# Patient Record
Sex: FEMALE | Race: WHITE | Employment: FULL TIME | URBAN - METROPOLITAN AREA
[De-identification: names, ages, dates, MRNs, and addresses within clinical notes are randomized per-mention and may not be internally consistent; named-entity substitution may affect disease eponyms.]

---

## 2017-04-03 ENCOUNTER — HOSPITAL ENCOUNTER (EMERGENCY)
Age: 52
Discharge: HOME | End: 2017-04-03
Attending: EMERGENCY MEDICINE
Payer: COMMERCIAL

## 2017-04-03 ENCOUNTER — APPOINTMENT (OUTPATIENT)
Dept: GENERAL RADIOLOGY | Age: 52
End: 2017-04-03
Attending: EMERGENCY MEDICINE
Payer: COMMERCIAL

## 2017-04-03 VITALS
TEMPERATURE: 98.4 F | HEART RATE: 79 BPM | RESPIRATION RATE: 17 BRPM | WEIGHT: 139 LBS | OXYGEN SATURATION: 98 % | SYSTOLIC BLOOD PRESSURE: 145 MMHG | BODY MASS INDEX: 25.58 KG/M2 | DIASTOLIC BLOOD PRESSURE: 95 MMHG | HEIGHT: 62 IN

## 2017-04-03 DIAGNOSIS — S52.552A OTHER CLOSED EXTRA-ARTICULAR FRACTURE OF DISTAL END OF LEFT RADIUS, INITIAL ENCOUNTER: Primary | ICD-10-CM

## 2017-04-03 DIAGNOSIS — T74.91XA DOMESTIC VIOLENCE OF ADULT, INITIAL ENCOUNTER: ICD-10-CM
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
## 2017-04-03 LAB
ANION GAP BLD CALC-SCNC: 8 MMOL/L (ref 7–16)
BASOPHILS # BLD AUTO: 0.1 K/UL (ref 0–0.2)
BASOPHILS # BLD: 1 % (ref 0–2)
BUN SERPL-MCNC: 17 MG/DL (ref 6–23)
CALCIUM SERPL-MCNC: 8.6 MG/DL (ref 8.3–10.4)
CHLORIDE SERPL-SCNC: 108 MMOL/L (ref 98–107)
CO2 SERPL-SCNC: 25 MMOL/L (ref 21–32)
CREAT SERPL-MCNC: 0.94 MG/DL (ref 0.6–1)
CRP SERPL-MCNC: 1 MG/DL (ref 0–0.9)
DIFFERENTIAL METHOD BLD: ABNORMAL
EOSINOPHIL # BLD: 0.2 K/UL (ref 0–0.8)
EOSINOPHIL NFR BLD: 3 % (ref 0.5–7.8)
ERYTHROCYTE [DISTWIDTH] IN BLOOD BY AUTOMATED COUNT: 14.8 % (ref 11.9–14.6)
ERYTHROCYTE [SEDIMENTATION RATE] IN BLOOD: 32 MM/HR (ref 0–30)
GLUCOSE SERPL-MCNC: 113 MG/DL (ref 65–100)
HCT VFR BLD AUTO: 36.3 % (ref 35.8–46.3)
HGB BLD-MCNC: 11.6 G/DL (ref 11.7–15.4)
IMM GRANULOCYTES # BLD: 0 K/UL (ref 0–0.5)
IMM GRANULOCYTES NFR BLD AUTO: 0.2 % (ref 0–5)
LYMPHOCYTES # BLD AUTO: 40 % (ref 13–44)
LYMPHOCYTES # BLD: 2.4 K/UL (ref 0.5–4.6)
MCH RBC QN AUTO: 27.6 PG (ref 26.1–32.9)
MCHC RBC AUTO-ENTMCNC: 32 G/DL (ref 31.4–35)
MCV RBC AUTO: 86.2 FL (ref 79.6–97.8)
MONOCYTES # BLD: 0.3 K/UL (ref 0.1–1.3)
MONOCYTES NFR BLD AUTO: 5 % (ref 4–12)
NEUTS SEG # BLD: 3 K/UL (ref 1.7–8.2)
NEUTS SEG NFR BLD AUTO: 51 % (ref 43–78)
PLATELET # BLD AUTO: 275 K/UL (ref 150–450)
PMV BLD AUTO: 9.6 FL (ref 10.8–14.1)
POTASSIUM SERPL-SCNC: 3.9 MMOL/L (ref 3.5–5.1)
RBC # BLD AUTO: 4.21 M/UL (ref 4.05–5.25)
SODIUM SERPL-SCNC: 141 MMOL/L (ref 136–145)
WBC # BLD AUTO: 5.9 K/UL (ref 4.3–11.1)

## 2017-04-03 PROCEDURE — 96365 THER/PROPH/DIAG IV INF INIT: CPT | Performed by: EMERGENCY MEDICINE

## 2017-04-03 PROCEDURE — 86140 C-REACTIVE PROTEIN: CPT | Performed by: EMERGENCY MEDICINE

## 2017-04-03 PROCEDURE — L3908 WHO COCK-UP NONMOLDE PRE OTS: HCPCS

## 2017-04-03 PROCEDURE — 99284 EMERGENCY DEPT VISIT MOD MDM: CPT | Performed by: EMERGENCY MEDICINE

## 2017-04-03 PROCEDURE — 85652 RBC SED RATE AUTOMATED: CPT | Performed by: EMERGENCY MEDICINE

## 2017-04-03 PROCEDURE — 96366 THER/PROPH/DIAG IV INF ADDON: CPT | Performed by: EMERGENCY MEDICINE

## 2017-04-03 PROCEDURE — 74011250636 HC RX REV CODE- 250/636: Performed by: EMERGENCY MEDICINE

## 2017-04-03 PROCEDURE — 80048 BASIC METABOLIC PNL TOTAL CA: CPT | Performed by: EMERGENCY MEDICINE

## 2017-04-03 PROCEDURE — 73140 X-RAY EXAM OF FINGER(S): CPT

## 2017-04-03 PROCEDURE — 85025 COMPLETE CBC W/AUTO DIFF WBC: CPT | Performed by: EMERGENCY MEDICINE

## 2017-04-03 PROCEDURE — 75810000053 HC SPLINT APPLICATION: Performed by: EMERGENCY MEDICINE

## 2017-04-03 PROCEDURE — 74011250637 HC RX REV CODE- 250/637: Performed by: EMERGENCY MEDICINE

## 2017-04-03 PROCEDURE — 73110 X-RAY EXAM OF WRIST: CPT

## 2017-04-03 RX ORDER — HYDROCODONE BITARTRATE AND ACETAMINOPHEN 5; 325 MG/1; MG/1
1-2 TABLET ORAL
Qty: 12 TAB | Refills: 0 | Status: SHIPPED | OUTPATIENT
Start: 2017-04-03 | End: 2017-04-03

## 2017-04-03 RX ORDER — IBUPROFEN 800 MG/1
800 TABLET ORAL
Qty: 20 TAB | Refills: 0 | Status: SHIPPED | OUTPATIENT
Start: 2017-04-03 | End: 2017-04-10

## 2017-04-03 RX ORDER — AMOXICILLIN AND CLAVULANATE POTASSIUM 875; 125 MG/1; MG/1
1 TABLET, FILM COATED ORAL 2 TIMES DAILY
Qty: 14 TAB | Refills: 0 | Status: SHIPPED | OUTPATIENT
Start: 2017-04-03 | End: 2017-04-03

## 2017-04-03 RX ORDER — HYDROCODONE BITARTRATE AND ACETAMINOPHEN 5; 325 MG/1; MG/1
1-2 TABLET ORAL
Qty: 30 TAB | Refills: 0 | Status: SHIPPED | OUTPATIENT
Start: 2017-04-03 | End: 2017-04-30

## 2017-04-03 RX ORDER — AMOXICILLIN AND CLAVULANATE POTASSIUM 875; 125 MG/1; MG/1
1 TABLET, FILM COATED ORAL 2 TIMES DAILY
Qty: 28 TAB | Refills: 0 | Status: SHIPPED | OUTPATIENT
Start: 2017-04-03 | End: 2017-04-17

## 2017-04-03 RX ORDER — IBUPROFEN 800 MG/1
800 TABLET ORAL
Qty: 20 TAB | Refills: 0 | Status: SHIPPED | OUTPATIENT
Start: 2017-04-03 | End: 2017-04-03

## 2017-04-03 RX ORDER — AMOXICILLIN AND CLAVULANATE POTASSIUM 875; 125 MG/1; MG/1
1 TABLET, FILM COATED ORAL
Status: COMPLETED | OUTPATIENT
Start: 2017-04-03 | End: 2017-04-03

## 2017-04-03 RX ADMIN — VANCOMYCIN HYDROCHLORIDE 1000 MG: 1 INJECTION, POWDER, LYOPHILIZED, FOR SOLUTION INTRAVENOUS at 08:54

## 2017-04-03 RX ADMIN — AMOXICILLIN AND CLAVULANATE POTASSIUM 1 TABLET: 875; 125 TABLET, FILM COATED ORAL at 07:54
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
## 2017-04-03 NOTE — LETTER
3777 [facility] EMERGENCY DEPT One [address] [address] 
[phone] Work/School Note Date: 4/3/2017 To Whom It May concern: 
 
Nona Warner was seen and treated today in the emergency room by the following provider(s): 
Attending Provider: Nhung Alvarado MD.   
 
Nona Warner may return to work on 4/05/17, please excuse Monday and/or Tuesday as needed.  
 
Sincerely, 
 
 
 
 
Nhung Alvarado MD

## 2017-04-03 NOTE — DISCHARGE INSTRUCTIONS
Neopsporin, or triple antibiotic, or bacitracin, and bandages to cover wounds. Rest, ice, elevate arm, splint. Follow up with dr Kimberlee Johnston: Care Instructions  Your Care Instructions  After an animal bite, the biggest concern is infection. The chance of infection depends on the type of animal that bit you, where on your body you were bitten, and your general health. Many animal bites are not closed with stitches, because this can increase the chance of infection. Your bite may take as little as 7 days or as long as several months to heal, depending on how bad it is. Taking good care of your wound at home will help it heal and reduce your chance of infection. The doctor has checked you carefully, but problems can develop later. If you notice any problems or new symptoms, get medical treatment right away. Follow-up care is a key part of your treatment and safety. Be sure to make and go to all appointments, and call your doctor if you are having problems. It's also a good idea to know your test results and keep a list of the medicines you take. How can you care for yourself at home? · If your doctor told you how to care for your wound, follow your doctor's instructions. If you did not get instructions, follow this general advice:  ¨ After 24 to 48 hours, gently wash the wound with clean water 2 times a day. Do not scrub or soak the wound. Don't use hydrogen peroxide or alcohol, which can slow healing. ¨ You may cover the wound with a thin layer of petroleum jelly, such as Vaseline, and a nonstick bandage. ¨ Apply more petroleum jelly and replace the bandage as needed. · After you shower, gently dry the wound with a clean towel. · If your doctor has closed the wound, cover the bandage with a plastic bag before you take a shower. · A small amount of skin redness and swelling around the wound edges and the stitches or staples is normal. Your wound may itch or feel irritated.  Do not scratch or rub the wound. · Ask your doctor if you can take an over-the-counter pain medicine, such as acetaminophen (Tylenol), ibuprofen (Advil, Motrin), or naproxen (Aleve). Read and follow all instructions on the label. · Do not take two or more pain medicines at the same time unless the doctor told you to. Many pain medicines have acetaminophen, which is Tylenol. Too much acetaminophen (Tylenol) can be harmful. · If your bite puts you at risk for rabies, you will get a series of shots over the next few weeks to prevent rabies. Your doctor will tell you when to get the shots. It is very important that you get the full cycle of shots. Follow your doctor's instructions exactly. · You may need a tetanus shot if you have not received one in the last 5 years. · If your doctor prescribed antibiotics, take them as directed. Do not stop taking them just because you feel better. You need to take the full course of antibiotics. When should you call for help? Call your doctor now or seek immediate medical care if:  · The skin near the bite turns cold or pale or it changes color. · You lose feeling in the area near the bite, or it feels numb or tingly. · You have trouble moving a limb near the bite. · You have symptoms of infection, such as:  ¨ Increased pain, swelling, warmth, or redness near the wound. ¨ Red streaks leading from the wound. ¨ Pus draining from the wound. ¨ A fever. · Blood soaks through the bandage. Oozing small amounts of blood is normal.  · Your pain is getting worse. Watch closely for changes in your health, and be sure to contact your doctor if you are not getting better as expected. Where can you learn more? Go to http://fany-coreen.info/. Enter K719 in the search box to learn more about \"Animal Bites: Care Instructions. \"  Current as of: May 27, 2016  Content Version: 11.2  © 1392-5689 [employer].  Care instructions adapted under license by [employer] (which disclaims liability or warranty for this information). If you have questions about a medical condition or this instruction, always ask your healthcare professional. [address] any warranty or liability for your use of this information.
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
## 2017-04-03 NOTE — LETTER
3517 [facility] EMERGENCY DEPT One [address] [address] [phone] Work/School Note Date: 4/3/2017 To Whom It May concern: 
 
Bernardino Thibodeaux was seen and treated today in the emergency room by the following provider(s): 
Attending Provider: Yvrose Matute MD.   
 
Bernardino Thibodeaux may return to work on 04/06/2017.  
 
Sincerely, 
 
 
 
 
Mary Ellen Fitzpatrick RN
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
## 2017-04-30 ENCOUNTER — HOSPITAL ENCOUNTER (EMERGENCY)
Age: 52
Discharge: HOME | End: 2017-04-30
Attending: EMERGENCY MEDICINE
Payer: COMMERCIAL

## 2017-04-30 ENCOUNTER — APPOINTMENT (OUTPATIENT)
Dept: GENERAL RADIOLOGY | Age: 52
End: 2017-04-30
Attending: NURSE PRACTITIONER
Payer: COMMERCIAL

## 2017-04-30 VITALS
HEART RATE: 84 BPM | TEMPERATURE: 98.1 F | BODY MASS INDEX: 25.58 KG/M2 | DIASTOLIC BLOOD PRESSURE: 74 MMHG | RESPIRATION RATE: 18 BRPM | HEIGHT: 62 IN | SYSTOLIC BLOOD PRESSURE: 136 MMHG | WEIGHT: 139 LBS | OXYGEN SATURATION: 100 %
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
## 2017-04-30 DIAGNOSIS — S62.609B FINGER FRACTURE, RIGHT, OPEN, INITIAL ENCOUNTER: ICD-10-CM

## 2017-04-30 PROCEDURE — 73090 X-RAY EXAM OF FOREARM: CPT

## 2017-04-30 PROCEDURE — 99284 EMERGENCY DEPT VISIT MOD MDM: CPT | Performed by: NURSE PRACTITIONER

## 2017-04-30 PROCEDURE — 96375 TX/PRO/DX INJ NEW DRUG ADDON: CPT | Performed by: NURSE PRACTITIONER

## 2017-04-30 PROCEDURE — 96365 THER/PROPH/DIAG IV INF INIT: CPT | Performed by: NURSE PRACTITIONER

## 2017-04-30 PROCEDURE — 96366 THER/PROPH/DIAG IV INF ADDON: CPT | Performed by: NURSE PRACTITIONER

## 2017-04-30 PROCEDURE — 74011250636 HC RX REV CODE- 250/636: Performed by: NURSE PRACTITIONER

## 2017-04-30 PROCEDURE — 74011250637 HC RX REV CODE- 250/637: Performed by: NURSE PRACTITIONER

## 2017-04-30 PROCEDURE — 73130 X-RAY EXAM OF HAND: CPT

## 2017-04-30 PROCEDURE — 77030018836 HC SOL IRR NACL ICUM -A

## 2017-04-30 RX ORDER — HYDROMORPHONE HYDROCHLORIDE 1 MG/ML
0.5 INJECTION, SOLUTION INTRAMUSCULAR; INTRAVENOUS; SUBCUTANEOUS
Status: COMPLETED | OUTPATIENT
Start: 2017-04-30 | End: 2017-04-30

## 2017-04-30 RX ORDER — ONDANSETRON 8 MG/1
8 TABLET, ORALLY DISINTEGRATING ORAL
Qty: 10 TAB | Refills: 0 | Status: SHIPPED | OUTPATIENT
Start: 2017-04-30 | End: 2017-07-10

## 2017-04-30 RX ORDER — AMOXICILLIN AND CLAVULANATE POTASSIUM 875; 125 MG/1; MG/1
1 TABLET, FILM COATED ORAL 2 TIMES DAILY
Qty: 14 TAB | Refills: 0 | Status: SHIPPED | OUTPATIENT
Start: 2017-04-30 | End: 2017-05-07

## 2017-04-30 RX ORDER — SODIUM CHLORIDE 9 MG/ML
1000 INJECTION, SOLUTION INTRAVENOUS CONTINUOUS
Status: DISCONTINUED | OUTPATIENT
Start: 2017-04-30 | End: 2017-04-30 | Stop reason: HOSPADM

## 2017-04-30 RX ORDER — HYDROCODONE BITARTRATE AND ACETAMINOPHEN 5; 325 MG/1; MG/1
1 TABLET ORAL ONCE
Status: COMPLETED | OUTPATIENT
Start: 2017-04-30 | End: 2017-04-30

## 2017-04-30 RX ORDER — HYDROCODONE BITARTRATE AND ACETAMINOPHEN 5; 325 MG/1; MG/1
1 TABLET ORAL ONCE
Qty: 20 TAB | Refills: 0 | Status: SHIPPED | OUTPATIENT
Start: 2017-04-30 | End: 2017-04-30

## 2017-04-30 RX ORDER — ONDANSETRON 2 MG/ML
4 INJECTION INTRAMUSCULAR; INTRAVENOUS
Status: COMPLETED | OUTPATIENT
Start: 2017-04-30 | End: 2017-04-30

## 2017-04-30 RX ADMIN — SODIUM CHLORIDE 1000 ML/HR: 900 INJECTION, SOLUTION INTRAVENOUS at 16:01

## 2017-04-30 RX ADMIN — ONDANSETRON 4 MG: 2 INJECTION INTRAMUSCULAR; INTRAVENOUS at 17:03

## 2017-04-30 RX ADMIN — HYDROMORPHONE HYDROCHLORIDE 0.5 MG: 1 INJECTION, SOLUTION INTRAMUSCULAR; INTRAVENOUS; SUBCUTANEOUS at 17:03

## 2017-04-30 RX ADMIN — HYDROCODONE BITARTRATE AND ACETAMINOPHEN 1 TABLET: 5; 325 TABLET ORAL at 14:52

## 2017-04-30 RX ADMIN — VANCOMYCIN HYDROCHLORIDE 1000 MG: 1 INJECTION, POWDER, LYOPHILIZED, FOR SOLUTION INTRAVENOUS at 16:01

## 2017-04-30 NOTE — Clinical Note
Home with family   Elevate limbs to prevent further edema. Keep wounds clean and dry. Follow with Dr Erenest Canavan tomorrow. Pain meds as needed.   Work note until cleared by Dr Erenest Canavan
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
## 2017-04-30 NOTE — DISCHARGE INSTRUCTIONS
Animal Bites: Care Instructions  Your Care Instructions  After an animal bite, the biggest concern is infection. The chance of infection depends on the type of animal that bit you, where on your body you were bitten, and your general health. Many animal bites are not closed with stitches, because this can increase the chance of infection. Your bite may take as little as 7 days or as long as several months to heal, depending on how bad it is. Taking good care of your wound at home will help it heal and reduce your chance of infection. The doctor has checked you carefully, but problems can develop later. If you notice any problems or new symptoms, get medical treatment right away. Follow-up care is a key part of your treatment and safety. Be sure to make and go to all appointments, and call your doctor if you are having problems. It's also a good idea to know your test results and keep a list of the medicines you take. How can you care for yourself at home? · If your doctor told you how to care for your wound, follow your doctor's instructions. If you did not get instructions, follow this general advice:  ¨ After 24 to 48 hours, gently wash the wound with clean water 2 times a day. Do not scrub or soak the wound. Don't use hydrogen peroxide or alcohol, which can slow healing. ¨ You may cover the wound with a thin layer of petroleum jelly, such as Vaseline, and a nonstick bandage. ¨ Apply more petroleum jelly and replace the bandage as needed. · After you shower, gently dry the wound with a clean towel. · If your doctor has closed the wound, cover the bandage with a plastic bag before you take a shower. · A small amount of skin redness and swelling around the wound edges and the stitches or staples is normal. Your wound may itch or feel irritated. Do not scratch or rub the wound.   · Ask your doctor if you can take an over-the-counter pain medicine, such as acetaminophen (Tylenol), ibuprofen (Advil, Motrin), or naproxen (Aleve). Read and follow all instructions on the label. · Do not take two or more pain medicines at the same time unless the doctor told you to. Many pain medicines have acetaminophen, which is Tylenol. Too much acetaminophen (Tylenol) can be harmful. · If your bite puts you at risk for rabies, you will get a series of shots over the next few weeks to prevent rabies. Your doctor will tell you when to get the shots. It is very important that you get the full cycle of shots. Follow your doctor's instructions exactly. · You may need a tetanus shot if you have not received one in the last 5 years. · If your doctor prescribed antibiotics, take them as directed. Do not stop taking them just because you feel better. You need to take the full course of antibiotics. When should you call for help? Call your doctor now or seek immediate medical care if:  · The skin near the bite turns cold or pale or it changes color. · You lose feeling in the area near the bite, or it feels numb or tingly. · You have trouble moving a limb near the bite. · You have symptoms of infection, such as:  ¨ Increased pain, swelling, warmth, or redness near the wound. ¨ Red streaks leading from the wound. ¨ Pus draining from the wound. ¨ A fever. · Blood soaks through the bandage. Oozing small amounts of blood is normal.  · Your pain is getting worse. Watch closely for changes in your health, and be sure to contact your doctor if you are not getting better as expected. Where can you learn more? Go to http://fany-coreen.info/. Enter H022 in the search box to learn more about \"Animal Bites: Care Instructions. \"  Current as of: May 27, 2016  Content Version: 11.2  © 9583-5914 [employer]. Care instructions adapted under license by [employer] (which disclaims liability or warranty for this information).  If you have questions about a medical condition or this instruction, always ask your healthcare professional. [place] [address] any warranty or liability for your use of this information. Finger Fracture: Care Instructions  Your Care Instructions    Breaks in the bones of the finger usually heal well in about 3 to 4 weeks. The pain and swelling from a broken finger can last for weeks. But it should steadily improve, starting a few days after you break it. It is very important that you wear and take care of the cast or splint exactly as your doctor tells you to so that your finger heals properly and does not end up crooked. Wearing a splint may interfere with your normal activities. Ask for help with daily tasks if you need it. You heal best when you take good care of yourself. Eat a variety of healthy foods, and don't smoke. Follow-up care is a key part of your treatment and safety. Be sure to make and go to all appointments, and call your doctor if you are having problems. It's also a good idea to know your test results and keep a list of the medicines you take. How can you care for yourself at home? · If your doctor put a splint on your finger, wear the splint exactly as directed. Do not remove it until your doctor says that you can. · Keep your hand raised above the level of your heart as much as you can. This will help reduce swelling. · Put ice or a cold pack on your finger for 10 to 20 minutes at a time. Try to do this every 1 to 2 hours for the next 3 days (when you are awake) or until the swelling goes down. Put a thin cloth between the ice and your skin. Keep the splint dry. · Be safe with medicines. Take pain medicines exactly as directed. ¨ If the doctor gave you a prescription medicine for pain, take it as prescribed. ¨ If you are not taking a prescription pain medicine, ask your doctor if you can take an over-the-counter medicine. When should you call for help? Call 911 anytime you think you may need emergency care.  For example, call if:  · Your finger is cool or pale or changes color. Call your doctor now or seek immediate medical care if:  · Your pain gets much worse. · You have tingling, weakness, or numbness in your finger. · You have signs of infection, such as:  ¨ Increased pain, swelling, warmth, or redness. ¨ Red streaks leading from the area. ¨ Pus draining from the area. ¨ Swollen lymph nodes in your neck, armpits, or groin. ¨ A fever. Watch closely for changes in your health, and be sure to contact your doctor if:  · Your finger is not steadily improving. Where can you learn more? Go to http://fany-coreen.info/. Enter X201 in the search box to learn more about \"Finger Fracture: Care Instructions. \"  Current as of: May 23, 2016  Content Version: 11.2  © 8376-0609 [employer]. Care instructions adapted under license by [employer] (which disclaims liability or warranty for this information). If you have questions about a medical condition or this instruction, always ask your healthcare professional. [address] any warranty or liability for your use of this information.

## 2017-04-30 NOTE — LETTER
3777 [facility] EMERGENCY DEPT One [address] [id number] [phone] Work/School Note Date: 4/30/2017 To Whom It May concern: 
 
Leeanne Houser was seen and treated today in the emergency room for dog bites, open fracture right small finger, and re injury to left forearm fracture by the following provider(s): 
Attending Provider: Margarita Dacosta MD 
Nurse Practitioner: Walt Dimas NP. Leeanne Houser may return to work after evaluation by Dr Lilian Toussaint. Sincerely, Walt Dimas NP

## 2017-04-30 NOTE — ED PROVIDER NOTES
HPI Comments: 47 y/o f to ed for eval after second set of dog bites in 4 weeks. Seen here 4 weeks ago with dog bites resulted in left forearm fsx with open wound. Just released by dr Tiny Laura to go back to work tomorrow. However, her dogs began fighting again today and she broke them up. This resulted in left forearm puncture wound as well as moderate swelling and pain. Also with lac to palmar and dorsal surface of right small finger. Patient is a 46 y.o. female presenting with dog bite. The history is provided by the patient. No  was used. Dog Bite    The incident occurred just prior to arrival. The incident occurred at home. There is an injury to the left forearm. There is an injury to the right little finger. The pain is moderate. It is unknown if a foreign body is present. Pertinent negatives include no chest pain, no numbness, no visual disturbance, no abdominal pain, no bowel incontinence, no nausea, no vomiting, no headaches, no hearing loss, no inability to bear weight, no neck pain, no pain when bearing weight, no focal weakness, no decreased responsiveness, no light-headedness, no loss of consciousness, no seizures, no tingling, no weakness, no cough, no difficulty breathing and no memory loss. There have been prior injuries to these areas. She has been behaving normally. Recently, medical care has been given at this facility. History reviewed. No pertinent past medical history. Past Surgical History:   Procedure Laterality Date    HX HEENT      HX ORTHOPAEDIC           Family History:   Problem Relation Age of Onset    Diabetes Mother     Heart Disease Mother     Hypertension Mother        Social History     Social History    Marital status:      Spouse name: N/A    Number of children: N/A    Years of education: N/A     Occupational History    Not on file.      Social History Main Topics    Smoking status: Never Smoker    Smokeless tobacco: Never Used    Alcohol use Yes      Comment: social    Drug use: No    Sexual activity: Not on file     Other Topics Concern    Not on file     Social History Narrative         ALLERGIES: Review of patient's allergies indicates no known allergies. Review of Systems   Constitutional: Negative for chills, decreased responsiveness and fever. HENT: Negative for facial swelling and hearing loss. Eyes: Negative for discharge and visual disturbance. Respiratory: Negative for cough and shortness of breath. Cardiovascular: Negative for chest pain and palpitations. Gastrointestinal: Negative for abdominal pain, bowel incontinence, nausea and vomiting. Endocrine: Negative for cold intolerance and heat intolerance. Genitourinary: Negative for difficulty urinating and dysuria. Musculoskeletal: Positive for myalgias. Negative for neck pain. Skin: Positive for wound. Negative for rash. Neurological: Negative for tingling, focal weakness, seizures, loss of consciousness, weakness, light-headedness, numbness and headaches. Psychiatric/Behavioral: Negative for confusion, decreased concentration and memory loss. Vitals:    04/30/17 1427   BP: 122/84   Pulse: 99   Resp: 17   Temp: 98 °F (36.7 °C)   SpO2: 99%   Weight: 63 kg (139 lb)   Height: 5' 2\" (1.575 m)            Physical Exam   Constitutional: She is oriented to person, place, and time. She appears well-developed and well-nourished. No distress. HENT:   Head: Normocephalic and atraumatic. Right Ear: External ear normal.   Left Ear: External ear normal.   Nose: Nose normal.   Eyes: Conjunctivae and EOM are normal. Pupils are equal, round, and reactive to light. Neck: Normal range of motion. Neck supple. Cardiovascular: Normal rate, regular rhythm and normal heart sounds. Pulmonary/Chest: Effort normal and breath sounds normal. No respiratory distress. She has no wheezes. Abdominal: Soft.  Bowel sounds are normal. She exhibits no distension. There is no tenderness. Musculoskeletal: Normal range of motion. She exhibits edema and tenderness. Arms:       Hands:  Neurological: She is alert and oriented to person, place, and time. No cranial nerve deficit. Coordination normal.   Skin: Skin is warm and dry. No rash noted. Psychiatric: She has a normal mood and affect. Her behavior is normal. Judgment and thought content normal.   Nursing note and vitals reviewed. MDM  Number of Diagnoses or Management Options  Diagnosis management comments: 45 y/o f w second set of dog bites in one month. Left forearm today with puncture wound over where she had previous fsx 4 weeks ago. Also with lacs times two to right pinky. Will provide pain control, xray, then clean up wounds and speak with ortho (she was just released by dr Jey Nevarez for return to work tomorrow)  3:55 PM  fsx noted to middle phalanx of 5th finger. Discussed with dr smith and will speak with ortho. 3:57 PM  Talked with dr Wilder Brown. Will provide iv vanc, clean out wounds with saline, and home with augmentin to follow with dr Jey Nevarez. 5:00 PM   Wounds irrigated with on liter normal saline. i will close loosely with steri strips once bleeding slows down again. i attempted int twice, both blew. Will wait RN to start iv. Pt cooperative, helpful with irrigation of wounds. Will provide pain meds again as well  5:35 PM\  Wounds closed with steri strips loosely on right small finger and left fore arm. Iv abx now infusing. Pain meds repeated and effective. Will prep for dc after abx complete.        Amount and/or Complexity of Data Reviewed  Tests in the radiology section of CPT®: ordered and reviewed  Discuss the patient with other providers: yes (jose manuel Smith  )    Risk of Complications, Morbidity, and/or Mortality  Presenting problems: minimal  Diagnostic procedures: minimal  Management options: low    Patient Progress  Patient progress: stable    ED Course Procedures

## 2017-05-03 ENCOUNTER — ANESTHESIA EVENT (OUTPATIENT)
Dept: SURGERY | Age: 52
End: 2017-05-03
Payer: COMMERCIAL

## 2017-05-03 RX ORDER — CELECOXIB 200 MG/1
200 CAPSULE ORAL
Status: CANCELLED | OUTPATIENT
Start: 2017-05-03

## 2017-05-03 RX ORDER — MIDAZOLAM HYDROCHLORIDE 1 MG/ML
2 INJECTION, SOLUTION INTRAMUSCULAR; INTRAVENOUS
Status: CANCELLED | OUTPATIENT
Start: 2017-05-03

## 2017-05-04 ENCOUNTER — ANESTHESIA (OUTPATIENT)
Dept: SURGERY | Age: 52
End: 2017-05-04
Payer: COMMERCIAL

## 2017-05-04 ENCOUNTER — HOSPITAL ENCOUNTER (OUTPATIENT)
Age: 52
Setting detail: OUTPATIENT SURGERY
Discharge: HOME | End: 2017-05-04
Attending: ORTHOPAEDIC SURGERY | Admitting: ORTHOPAEDIC SURGERY
Payer: COMMERCIAL

## 2017-05-04 ENCOUNTER — APPOINTMENT (OUTPATIENT)
Dept: GENERAL RADIOLOGY | Age: 52
End: 2017-05-04
Attending: ORTHOPAEDIC SURGERY
Payer: COMMERCIAL

## 2017-05-04 VITALS
TEMPERATURE: 97.7 F | SYSTOLIC BLOOD PRESSURE: 142 MMHG | WEIGHT: 140.5 LBS | DIASTOLIC BLOOD PRESSURE: 88 MMHG | RESPIRATION RATE: 16 BRPM | HEART RATE: 78 BPM | BODY MASS INDEX: 25.7 KG/M2 | OXYGEN SATURATION: 97 %

## 2017-05-04 PROCEDURE — 77030020778 HC CAP PROTCT PIN JRGN -A: Performed by: ORTHOPAEDIC SURGERY

## 2017-05-04 PROCEDURE — 77030002922 HC SUT FBRWRE ARTH -B: Performed by: ORTHOPAEDIC SURGERY

## 2017-05-04 PROCEDURE — 77030003666 HC NDL SPINAL BD -A: Performed by: ORTHOPAEDIC SURGERY

## 2017-05-04 PROCEDURE — 76942 ECHO GUIDE FOR BIOPSY: CPT | Performed by: ORTHOPAEDIC SURGERY

## 2017-05-04 PROCEDURE — 74011250636 HC RX REV CODE- 250/636

## 2017-05-04 PROCEDURE — 77030018836 HC SOL IRR NACL ICUM -A: Performed by: ORTHOPAEDIC SURGERY

## 2017-05-04 PROCEDURE — 76210000020 HC REC RM PH II FIRST 0.5 HR: Performed by: ORTHOPAEDIC SURGERY

## 2017-05-04 PROCEDURE — A4565 SLINGS: HCPCS | Performed by: ORTHOPAEDIC SURGERY

## 2017-05-04 PROCEDURE — 77030000032 HC CUF TRNQT ZIMM -B: Performed by: ORTHOPAEDIC SURGERY

## 2017-05-04 PROCEDURE — 77030002986 HC SUT PROL J&J -A: Performed by: ORTHOPAEDIC SURGERY

## 2017-05-04 PROCEDURE — 74011250636 HC RX REV CODE- 250/636: Performed by: ORTHOPAEDIC SURGERY

## 2017-05-04 PROCEDURE — 76010000161 HC OR TIME 1 TO 1.5 HR INTENSV-TIER 1: Performed by: ORTHOPAEDIC SURGERY

## 2017-05-04 PROCEDURE — 76010010054 HC POST OP PAIN BLOCK: Performed by: ORTHOPAEDIC SURGERY

## 2017-05-04 PROCEDURE — 77030002987 HC SUT PROL J&J -B: Performed by: ORTHOPAEDIC SURGERY

## 2017-05-04 PROCEDURE — 76060000034 HC ANESTHESIA 1.5 TO 2 HR: Performed by: ORTHOPAEDIC SURGERY

## 2017-05-04 PROCEDURE — 76210000006 HC OR PH I REC 0.5 TO 1 HR: Performed by: ORTHOPAEDIC SURGERY

## 2017-05-04 PROCEDURE — 74011000250 HC RX REV CODE- 250

## 2017-05-04 PROCEDURE — 77030003602 HC NDL NRV BLK BBMI -B: Performed by: ANESTHESIOLOGY

## 2017-05-04 PROCEDURE — 74011250636 HC RX REV CODE- 250/636: Performed by: ANESTHESIOLOGY

## 2017-05-04 RX ORDER — PROPOFOL 10 MG/ML
INJECTION, EMULSION INTRAVENOUS AS NEEDED
Status: DISCONTINUED | OUTPATIENT
Start: 2017-05-04 | End: 2017-05-04 | Stop reason: HOSPADM

## 2017-05-04 RX ORDER — LIDOCAINE HYDROCHLORIDE 10 MG/ML
0.1 INJECTION INFILTRATION; PERINEURAL AS NEEDED
Status: DISCONTINUED | OUTPATIENT
Start: 2017-05-04 | End: 2017-05-04 | Stop reason: HOSPADM

## 2017-05-04 RX ORDER — SODIUM CHLORIDE 0.9 % (FLUSH) 0.9 %
5-10 SYRINGE (ML) INJECTION AS NEEDED
Status: DISCONTINUED | OUTPATIENT
Start: 2017-05-04 | End: 2017-05-04 | Stop reason: HOSPADM

## 2017-05-04 RX ORDER — DIPHENHYDRAMINE HYDROCHLORIDE 50 MG/ML
INJECTION, SOLUTION INTRAMUSCULAR; INTRAVENOUS AS NEEDED
Status: DISCONTINUED | OUTPATIENT
Start: 2017-05-04 | End: 2017-05-04 | Stop reason: HOSPADM

## 2017-05-04 RX ORDER — SODIUM CHLORIDE 0.9 % (FLUSH) 0.9 %
5-10 SYRINGE (ML) INJECTION EVERY 8 HOURS
Status: DISCONTINUED | OUTPATIENT
Start: 2017-05-04 | End: 2017-05-04 | Stop reason: HOSPADM

## 2017-05-04 RX ORDER — LIDOCAINE HYDROCHLORIDE 20 MG/ML
INJECTION, SOLUTION EPIDURAL; INFILTRATION; INTRACAUDAL; PERINEURAL AS NEEDED
Status: DISCONTINUED | OUTPATIENT
Start: 2017-05-04 | End: 2017-05-04 | Stop reason: HOSPADM

## 2017-05-04 RX ORDER — OXYCODONE HYDROCHLORIDE 5 MG/1
5 TABLET ORAL
Status: DISCONTINUED | OUTPATIENT
Start: 2017-05-04 | End: 2017-05-04 | Stop reason: HOSPADM

## 2017-05-04 RX ORDER — PROPOFOL 10 MG/ML
INJECTION, EMULSION INTRAVENOUS
Status: DISCONTINUED | OUTPATIENT
Start: 2017-05-04 | End: 2017-05-04 | Stop reason: HOSPADM

## 2017-05-04 RX ORDER — SODIUM CHLORIDE, SODIUM LACTATE, POTASSIUM CHLORIDE, CALCIUM CHLORIDE 600; 310; 30; 20 MG/100ML; MG/100ML; MG/100ML; MG/100ML
75 INJECTION, SOLUTION INTRAVENOUS CONTINUOUS
Status: DISCONTINUED | OUTPATIENT
Start: 2017-05-04 | End: 2017-05-04 | Stop reason: HOSPADM

## 2017-05-04 RX ORDER — HYDROMORPHONE HYDROCHLORIDE 2 MG/ML
0.5 INJECTION, SOLUTION INTRAMUSCULAR; INTRAVENOUS; SUBCUTANEOUS
Status: DISCONTINUED | OUTPATIENT
Start: 2017-05-04 | End: 2017-05-04 | Stop reason: HOSPADM

## 2017-05-04 RX ORDER — FENTANYL CITRATE 50 UG/ML
100 INJECTION, SOLUTION INTRAMUSCULAR; INTRAVENOUS ONCE
Status: DISCONTINUED | OUTPATIENT
Start: 2017-05-04 | End: 2017-05-04 | Stop reason: HOSPADM

## 2017-05-04 RX ORDER — MIDAZOLAM HYDROCHLORIDE 1 MG/ML
2 INJECTION, SOLUTION INTRAMUSCULAR; INTRAVENOUS ONCE
Status: COMPLETED | OUTPATIENT
Start: 2017-05-04 | End: 2017-05-04

## 2017-05-04 RX ORDER — SODIUM CHLORIDE, SODIUM LACTATE, POTASSIUM CHLORIDE, CALCIUM CHLORIDE 600; 310; 30; 20 MG/100ML; MG/100ML; MG/100ML; MG/100ML
50 INJECTION, SOLUTION INTRAVENOUS CONTINUOUS
Status: DISCONTINUED | OUTPATIENT
Start: 2017-05-04 | End: 2017-05-04 | Stop reason: HOSPADM

## 2017-05-04 RX ORDER — ALBUTEROL SULFATE 0.83 MG/ML
2.5 SOLUTION RESPIRATORY (INHALATION) AS NEEDED
Status: DISCONTINUED | OUTPATIENT
Start: 2017-05-04 | End: 2017-05-04 | Stop reason: HOSPADM

## 2017-05-04 RX ORDER — CEFAZOLIN SODIUM IN 0.9 % NACL 2 G/50 ML
2 INTRAVENOUS SOLUTION, PIGGYBACK (ML) INTRAVENOUS ONCE
Status: COMPLETED | OUTPATIENT
Start: 2017-05-04 | End: 2017-05-04

## 2017-05-04 RX ADMIN — MIDAZOLAM HYDROCHLORIDE 2 MG: 1 INJECTION, SOLUTION INTRAMUSCULAR; INTRAVENOUS at 08:59

## 2017-05-04 RX ADMIN — PROPOFOL 100 MG: 10 INJECTION, EMULSION INTRAVENOUS at 09:53

## 2017-05-04 RX ADMIN — CEFAZOLIN 2 G: 1 INJECTION, POWDER, FOR SOLUTION INTRAMUSCULAR; INTRAVENOUS; PARENTERAL at 09:58

## 2017-05-04 RX ADMIN — DIPHENHYDRAMINE HYDROCHLORIDE 25 MG: 50 INJECTION, SOLUTION INTRAMUSCULAR; INTRAVENOUS at 10:12

## 2017-05-04 RX ADMIN — SODIUM CHLORIDE, SODIUM LACTATE, POTASSIUM CHLORIDE, AND CALCIUM CHLORIDE 75 ML/HR: 600; 310; 30; 20 INJECTION, SOLUTION INTRAVENOUS at 09:01

## 2017-05-04 RX ADMIN — LIDOCAINE HYDROCHLORIDE 60 MG: 20 INJECTION, SOLUTION EPIDURAL; INFILTRATION; INTRACAUDAL; PERINEURAL at 09:53

## 2017-05-04 RX ADMIN — PROPOFOL 120 MCG/KG/MIN: 10 INJECTION, EMULSION INTRAVENOUS at 09:53

## 2017-05-04 NOTE — ANESTHESIA POSTPROCEDURE EVALUATION
Post-Anesthesia Evaluation and Assessment    Patient: Jesusita White MRN: [id number]  SSN: [id number]    YOB: 1965  Age: 46 y.o. Sex: female       Cardiovascular Function/Vital Signs  Visit Vitals    /84    Pulse 79    Temp 36.5 °C (97.7 °F)    Resp 16    Wt 63.7 kg (140 lb 8 oz)    SpO2 97%    BMI 25.7 kg/m2       Patient is status post total IV anesthesia anesthesia for Procedure(s):  RIGHT SMALL FINGER OPEN REDUCTION INTERNAL FIXATION WITH IRRIGATION AND DEBRIDEMENT TENDON REPAIR OPEN FRACTURE . Nausea/Vomiting: None    Postoperative hydration reviewed and adequate. Pain:  Pain Scale 1: Visual (05/04/17 1123)  Pain Intensity 1: 0 (05/04/17 1123)   Managed    Neurological Status:   Neuro (WDL): Exceptions to WDL (05/04/17 1123)  Neuro  Neurologic State: Drowsy (05/04/17 1123)  LUE Motor Response: Purposeful (05/04/17 1123)  LLE Motor Response: Purposeful (05/04/17 1123)  RUE Motor Response: Purposeful (05/04/17 1123)  RLE Motor Response: Purposeful (05/04/17 1123)   Right axillary block otherwise normal    Mental Status and Level of Consciousness: Alert and oriented     Pulmonary Status:   O2 Device: (P) Nasal cannula (05/04/17 1135)   Adequate oxygenation and airway patent    Complications related to anesthesia: None    Post-anesthesia assessment completed.  No concerns    Signed By: Wandy Mclaughlin MD     May 4, 2017

## 2017-05-04 NOTE — ANESTHESIA PROCEDURE NOTES
Peripheral Block    Start time: 5/4/2017 8:59 AM  End time: 5/4/2017 9:06 AM  Performed by: Chapis Bauman  Authorized by: Chapis Bauman       Pre-procedure:    Indications: at surgeon's request and post-op pain management    Preanesthetic Checklist: patient identified, risks and benefits discussed, site marked, timeout performed, anesthesia consent given and patient being monitored    Timeout Time: 08:59          Block Type:   Block Type:  Axillary  Laterality:  Right  Monitoring:  Frequent vital sign checks, heart rate, oxygen, continuous pulse ox and responsive to questions  Injection Technique:  Single shot  Procedures: ultrasound guided and nerve stimulator    Patient Position: supine  Prep: chlorhexidine    Location:  Axilla  Needle Type:  Stimuplex  Needle Gauge:  22 G  Needle Localization:  Nerve stimulator and ultrasound guidance  Motor Response: minimal motor response >0.4 mA    Medication Injected:  0.5%  ropivacaine  Adds:  Epi 1:200K  Volume (mL):  40    Assessment:  Number of attempts:  1  Injection Assessment:  Incremental injection every 5 mL, negative aspiration for CSF, no paresthesia, ultrasound image on chart, no intravascular symptoms, negative aspiration for blood and local visualized surrounding nerve on ultrasound  Patient tolerance:  Patient tolerated the procedure well with no immediate complications

## 2017-05-04 NOTE — IP AVS SNAPSHOT
[address] 
[phone] Patient: Kimberly Rodriguez MRN: [id number] FEV:8/6/9215 You are allergic to the following No active allergies Recent Documentation Weight BMI OB Status Smoking Status 63.7 kg 25.7 kg/m2 Menopause Never Smoker Emergency Contacts Name Discharge Info Relation Home Work Mobile Shaye Duncan  Child [2] [phone] About your hospitalization You were admitted on: May 4, 2017 You last received care in the:  [facility] OP PACU You were discharged on: May 4, 2017 Unit phone number:  [phone] Why you were hospitalized Your primary diagnosis was:  Not on File Providers Seen During Your Hospitalizations Provider Role Specialty Primary office phone Bobby Lundberg MD Attending Provider Orthopedic Surgery [phone] Your Primary Care Physician (PCP) Primary Care Physician Office Phone Office Fax [facility] [phone] Follow-up Information Follow up With Details Comments Contact Info Bard Odalis MD   [facility] [facility] [address] [place] [address] 
[phone] Current Discharge Medication List  
  
CONTINUE these medications which have NOT CHANGED Dose & Instructions Dispensing Information Comments Morning Noon Evening Bedtime  
 amoxicillin-clavulanate 875-125 mg per tablet Commonly known as:  AUGMENTIN Your last dose was: Your next dose is:    
   
   
 Dose:  1 Tab Take 1 Tab by mouth two (2) times a day for 7 days. Quantity:  14 Tab Refills:  0 NORCO 5-325 mg per tablet Generic drug:  HYDROcodone-acetaminophen Your last dose was: Your next dose is:    
   
   
 Dose:  1 Tab Take 1 Tab by mouth every eight (8) hours as needed for Pain. Indications: Pain Refills:  0  
     
   
   
   
  
 ondansetron 8 mg disintegrating tablet Commonly known as:  ZOFRAN ODT Your last dose was: Your next dose is:    
   
   
 Dose:  8 mg Take 1 Tab by mouth every eight (8) hours as needed for Nausea. Quantity:  10 Tab Refills:  0 Discharge Instructions Keep splint clean, dry and intact until seen in the office. Move fingers not involved in splint, elevate, to prevent swelling. No lifting. ACTIVITY · As tolerated and as directed by your doctor. · Bathe or shower as directed by your doctor. DIET · Clear liquids until no nausea or vomiting; then light diet for the first day. · Advance to regular diet on second day, unless your doctor orders otherwise. · If nausea and vomiting continues, call your doctor. PAIN 
· Take pain medication as directed by your doctor. · Call your doctor if pain is NOT relieved by medication. · DO NOT take aspirin of blood thinners unless directed by your doctor. DRESSING CARE  
 
 
CALL YOUR DOCTOR IF  
· Excessive bleeding that does not stop after holding pressure over the area · Temperature of 101 degrees F or above · Excessive redness, swelling or bruising, and/ or green or yellow, smelly discharge from incision AFTER ANESTHESIA · For the first 24 hours: DO NOT Drive, Drink alcoholic beverages, or Make important decisions. · Be aware of dizziness following anesthesia and while taking pain medication. APPOINTMENT DATE/ TIME 
 
YOUR DOCTOR'S PHONE NUMBER  
 
 
DISCHARGE SUMMARY from Nurse PATIENT INSTRUCTIONS: 
 
After general anesthesia or intravenous sedation, for 24 hours or while taking prescription Narcotics: · Limit your activities · Do not drive and operate hazardous machinery · Do not make important personal or business decisions · Do  not drink alcoholic beverages · If you have not urinated within 8 hours after discharge, please contact your surgeon on call. *  Please give a list of your current medications to your Primary Care Provider. *  Please update this list whenever your medications are discontinued, doses are 
    changed, or new medications (including over-the-counter products) are added. *  Please carry medication information at all times in case of emergency situations. These are general instructions for a healthy lifestyle: No smoking/ No tobacco products/ Avoid exposure to second hand smoke Surgeon General's Warning:  Quitting smoking now greatly reduces serious risk to your health. Obesity, smoking, and sedentary lifestyle greatly increases your risk for illness A healthy diet, regular physical exercise & weight monitoring are important for maintaining a healthy lifestyle You may be retaining fluid if you have a history of heart failure or if you experience any of the following symptoms:  Weight gain of 3 pounds or more overnight or 5 pounds in a week, increased swelling in our hands or feet or shortness of breath while lying flat in bed. Please call your doctor as soon as you notice any of these symptoms; do not wait until your next office visit. Recognize signs and symptoms of STROKE: 
 
F-face looks uneven A-arms unable to move or move unevenly S-speech slurred or non-existent T-time-call 911 as soon as signs and symptoms begin-DO NOT go Back to bed or wait to see if you get better-TIME IS BRAIN. Discharge Orders None Introducing [facility]! Toribio Nyhan introduces [employer] patient portal. Now you can access parts of your medical record, email your doctor's office, and request medication refills online. 1. In your internet browser, go to [email or web address] 2. Click on the First Time User? Click Here link in the Sign In box.  You will see the New Member Sign Up page. 3. Enter your [employer] Access Code exactly as it appears below. You will not need to use this code after youve completed the sign-up process. If you do not sign up before the expiration date, you must request a new code. · [employer] Access Code: [email or web address] Expires: 7/2/2017  7:28 AM 
 
4. Enter the last four digits of your Social Security Number (xxxx) and Date of Birth (mm/dd/yyyy) as indicated and click Submit. You will be taken to the next sign-up page. 5. Create a [employer] ID. This will be your [employer] login ID and cannot be changed, so think of one that is secure and easy to remember. 6. Create a [employer] password. You can change your password at any time. 7. Enter your Password Reset Question and Answer. This can be used at a later time if you forget your password. 8. Enter your e-mail address. You will receive e-mail notification when new information is available in [address] 9. Click Sign Up. You can now view and download portions of your medical record. 10. Click the Download Summary menu link to download a portable copy of your medical information. If you have questions, please visit the Frequently Asked Questions section of the [employer] website. Remember, [employer] is NOT to be used for urgent needs. For medical emergencies, dial 911. Now available from your iPhone and Android! General Information Please provide this summary of care documentation to your next provider. Patient Signature:  ____________________________________________________________ Date:  ____________________________________________________________  
  
Mey Herrera Provider Signature:  ____________________________________________________________ Date:  ____________________________________________________________

## 2017-05-04 NOTE — DISCHARGE INSTRUCTIONS
Keep splint clean, dry and intact until seen in the office. Move fingers not involved in splint, elevate, to prevent swelling. No lifting. ACTIVITY  · As tolerated and as directed by your doctor. · Bathe or shower as directed by your doctor. DIET  · Clear liquids until no nausea or vomiting; then light diet for the first day. · Advance to regular diet on second day, unless your doctor orders otherwise. · If nausea and vomiting continues, call your doctor. PAIN  · Take pain medication as directed by your doctor. · Call your doctor if pain is NOT relieved by medication. · DO NOT take aspirin of blood thinners unless directed by your doctor. DRESSING CARE       CALL YOUR DOCTOR IF   · Excessive bleeding that does not stop after holding pressure over the area  · Temperature of 101 degrees F or above  · Excessive redness, swelling or bruising, and/ or green or yellow, smelly discharge from incision    AFTER ANESTHESIA   · For the first 24 hours: DO NOT Drive, Drink alcoholic beverages, or Make important decisions. · Be aware of dizziness following anesthesia and while taking pain medication. APPOINTMENT DATE/ TIME    YOUR DOCTOR'S PHONE NUMBER       DISCHARGE SUMMARY from Nurse    PATIENT INSTRUCTIONS:    After general anesthesia or intravenous sedation, for 24 hours or while taking prescription Narcotics:  · Limit your activities  · Do not drive and operate hazardous machinery  · Do not make important personal or business decisions  · Do  not drink alcoholic beverages  · If you have not urinated within 8 hours after discharge, please contact your surgeon on call. *  Please give a list of your current medications to your Primary Care Provider. *  Please update this list whenever your medications are discontinued, doses are      changed, or new medications (including over-the-counter products) are added.     *  Please carry medication information at all times in case of emergency situations. These are general instructions for a healthy lifestyle:    No smoking/ No tobacco products/ Avoid exposure to second hand smoke    Surgeon General's Warning:  Quitting smoking now greatly reduces serious risk to your health. Obesity, smoking, and sedentary lifestyle greatly increases your risk for illness    A healthy diet, regular physical exercise & weight monitoring are important for maintaining a healthy lifestyle    You may be retaining fluid if you have a history of heart failure or if you experience any of the following symptoms:  Weight gain of 3 pounds or more overnight or 5 pounds in a week, increased swelling in our hands or feet or shortness of breath while lying flat in bed. Please call your doctor as soon as you notice any of these symptoms; do not wait until your next office visit. Recognize signs and symptoms of STROKE:    F-face looks uneven    A-arms unable to move or move unevenly    S-speech slurred or non-existent    T-time-call 911 as soon as signs and symptoms begin-DO NOT go       Back to bed or wait to see if you get better-TIME IS BRAIN.

## 2017-05-04 NOTE — ANESTHESIA PREPROCEDURE EVALUATION
Anesthetic History     PONV          Review of Systems / Medical History  Patient summary reviewed, nursing notes reviewed and pertinent labs reviewed    Pulmonary  Within defined limits                 Neuro/Psych         Psychiatric history     Cardiovascular  Within defined limits                Exercise tolerance: >4 METS     GI/Hepatic/Renal  Within defined limits              Endo/Other        Arthritis     Other Findings              Physical Exam    Airway  Mallampati: I      Mouth opening: Normal     Cardiovascular  Regular rate and rhythm,  S1 and S2 normal,  no murmur, click, rub, or gallop             Dental  No notable dental hx       Pulmonary  Breath sounds clear to auscultation               Abdominal         Other Findings            Anesthetic Plan    ASA: 2  Anesthesia type: total IV anesthesia - brachial plexus block      Post-op pain plan if not by surgeon: peripheral nerve block single    Induction: Intravenous  Anesthetic plan and risks discussed with: Patient and Son / Daughter

## 2017-05-04 NOTE — BRIEF OP NOTE
BRIEF OPERATIVE NOTE    Date of Procedure: 5/4/2017   Preoperative Diagnosis: Fracture of unspecified phalanx of unspecified finger, initial encounter for open fracture [Z56.640Y]  Postoperative Diagnosis: Fracture of unspecified phalanx of small right finger    Procedure(s):  RIGHT SMALL FINGER OPEN REDUCTION INTERNAL FIXATION WITH IRRIGATION AND DEBRIDEMENT TENDON REPAIR OPEN FRACTURE   Surgeon(s) and Role:     * Austin Smith MD - Primary         Assistant Staff:       Surgical Staff:  Circ-1: Chapis Simon RN  Radiology Technician: Moshe Balderrama, RT, R, CT; Higinio Vazquez RT, R  Scrub Tech-1: Jatin Saleem  Event Time In   Incision Start 1010   Incision Close 1108     Anesthesia: Regional   Estimated Blood Loss: MINIMAL  Specimens: * No specimens in log *   Findings: SEE DICTATION   Complications: NONE  Implants: * No implants in log *

## 2017-05-10 NOTE — OP NOTES
[facility] 65   OPERATIVE REPORT       Name:  Domniique Wagoner   MR#:  [id number]   :  1965   Account #:  [de-identified]   Date of Adm:  2017       DATE OF SURGERY: 2017     PREOPERATIVE DIAGNOSIS: Right open dog bite with middle phalanx   fracture of the small finger. POSTOPERATIVE DIAGNOSES   1. Right open dog bite with middle phalanx fracture of the small   finger. 2. Flexor tendon laceration. 3. Extensor tendon laceration with open fracture. NAME OF PROCEDURE   1. Right small finger middle phalanx irrigation and debridement   of skin, subcutaneous tissue, fascia and bone with an open   fracture. 2. Open reduction and internal fixation of right small middle   phalanx fracture. 3. Flexor digitorum profundus (FDP) repair of the right small   finger. SURGEON: Izabella Fu MD    ANESTHESIA: Plexus with MAC. ESTIMATED BLOOD LOSS/INTRAVENOUS FLUIDS: Per Anesthesia. COMPLICATIONS: None. DISPOSITION: Stable to recovery room. INDICATIONS FOR PROCEDURE: The patient had a second dog bite   injury in the past month. This time, she sustained an open   fracture to the right small middle phalanx with angulation,   possible extensor or flexor tendon involvement, consistent with   an open fracture. The risks and benefits of the procedure were   discussed with her, she wanted to proceed with surgery. Risks   and benefits of the procedure were discussed with her including,   but not limited to bleeding, infection, injury to adjacent   structures, need for additional procedures, wound dehiscence,   scar formation, incomplete resolution of symptoms, recurrence of   symptoms, decreased range of motion, stiffness and pain as well   as the anesthetic risks. Also discussed failure of hardware, need for removal of   hardware, malunion, nonunion, pin tract infection, and   stiffness. Informed consent was obtained.     PROCEDURE IN DETAIL: The patient was seen and marked in the   preoperative suite. She underwent nerve block. She was taken   back to the OR, placed on the table in supine position with her   right upper extremity on a hand table. The right upper extremity   was then prepped and draped in standard sterile fashion. A   formal time-out was performed confirming patient identification,   preoperative antibiotics, as well as planned operative   procedure. We turned our attention to the first portion of the procedure,   which was the irrigation, debridement of open fracture. She had   a volar laceration central aspect as well as a dorsal   laceration. We had to perform a standard Z-plasty on the volar   aspect so we could get adequate visualization of the   neurovascular bundles, as well as an adequate irrigation and   debridement. Less dissection was performed dorsally. We did open   up the wound. We trimmed skin edges on both sides. We debrided   skin, subcutaneous tissue, fascia and bone. We irrigated   copiously with approximately 3 L of normal saline. We identified   that both neurovascular bundles were intact and unharmed. However,   the ulnar half of her FDP was lacerated over 50%. Next, in a separate procedure, we repaired this laceration with   modified Perkins 4-0 FiberWire suture. We then turned our   attention to the third portion of the procedure. Next, under a separate procedure, we performed an open   reduction, internal fixation of the middle phalanx open   fracture. We were able to get it reduced. Her middle phalanx was   quite small, so standard K-wires made it difficult for us to get   fixation. We utilized a 22=gauge spinal needle, allowing us to   get an excellent starting point, then cross pinned the fracture   in anatomic reduction. Final radiographs showed reduction and   placement of hardware. Jurgan balls were placed. We irrigated   copiously with normal saline. We closed with Prolene, leaving it   slightly loose to drain. Xeroform placed. She was placed into a   soft dressing with a long-arm splint and was taken to the   recovery room having tolerated the procedure well. At the end of the procedure, her fingers pinked up nicely. POSTOPERATIVE CARE: Followup in 10 days to 2 weeks. She will   continue antibiotics. Transition her to a removable digital splint.         MD TIFFANY Frankel / Yessi Shah   D:  05/09/2017   20:25   T:  05/10/2017   03:37   Job #:  [id number]

## 2017-05-16 ENCOUNTER — HOSPITAL ENCOUNTER (OUTPATIENT)
Dept: PHYSICAL THERAPY | Age: 52
Discharge: HOME | End: 2017-05-16
Payer: COMMERCIAL

## 2017-05-16 PROCEDURE — 97165 OT EVAL LOW COMPLEX 30 MIN: CPT

## 2017-05-16 NOTE — PROGRESS NOTES
Sly Woods  : 1965 [facility]  [address], [address] [address], [place] [address].  [phone]   [phone]         OUTPATIENT OCCUPATIONAL THERAPY: Initial Assessment 2017    ICD-10: Treatment Diagnosis: Pain in right hand (M79.641)Stiffness of right hand, not elsewhere classified (M25.641)  Precautions/Allergies:   Review of patient's allergies indicates no known allergies. Fall Risk Score: 0 (? 5 = High Risk)  MD Orders: Evaluate and treat: splint to go around pins/S/P ORIF right small finger MEDICAL/REFERRING DIAGNOSIS:   Fracture of unspecified phalanx of unspecified finger, initial encounter for open fracture [S62.609B]   DATE OF ONSET: 2017  DATE OF SURGERY: 5/10/2017   REFERRING PHYSICIAN: Omar Arellano MD  RETURN PHYSICIAN APPOINTMENT: 2017     INITIAL ASSESSMENT:  Ms. Randy Schmidt presents with decreased functional use, strength and range of motion of her right Little finger PIP joint dorsal aspect. and upper extremity that is affecting her independence with activities of daily living and ability to perform job tasks. I feel that Ms. Randy Schmidt will benefit from skilled occupational therapy to maximize the functional use of her right\"upper extremity in daily activities and work tasks. PLAN OF CARE:   PROBLEM LIST:  1. Pain in right hand. 2. Decreased motion in right little finger. 3. Decreased strength in right hand. INTERVENTIONS PLANNED:  1. Modalities that may include fluidotherapy, paraffin, ultrasound, and light therapy. 2. Therapeutic exercise including a home exercise program.  3. Manual therapy. 4. Therapeutic activities. TREATMENT PLAN:  Effective Dates: 2017 TO 2017. Frequency/Duration: 1 time a week for 12 weeks  GOALS: (Goals have been discussed and agreed upon with patient.)  Short-Term Functional Goals: Time Frame: 4 weeks  1. Decrease pain to 4 to allow patient to perform self care tasks.   2. Increase motion in right little finger by 15 degrees to improve functional use of upper extremity in ADL activities. 3. Increase strength in right hand by 10 pounds to allow patient to  and lift objects during self care activities. Discharge Goals: Time Frame: 10 weeks  1. Decrease pain to 2 to allow patient to perform all household and work tasks. 2. Increase motion in right little finger by 30 degreees to allow patient to perform all ADL activities. 3. Increase strength in right hand by 20 pounds to allow patient to , lift, hold, and carry heavy objects. Rehabilitation Potential For Stated Goals: Good  Regarding Deandre Lights therapy, I certify that the treatment plan above will be carried out by a therapist or under their direction. Thank you for this referral,  Apurva Fermin OT       Referring Physician Signature: Jj Thomas MD _________________________  Date _________            The information in this section was collected on 5/16/2017 (except where otherwise noted). OCCUPATIONAL PROFILE & HISTORY:   History of Present Injury/Illness (Reason for Referral): The patient was bitten by her dog and fractured her right little finger. Past Medical History/Comorbidities:   Ms. Gera Maldnoado  has a past medical history of Nausea & vomiting. Ms. Gera Maldonado  has a past surgical history that includes heent; orthopaedic (1990); and orthopaedic (Right). 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  Dominant Side:         RIGHT  Current Medications:    Current Outpatient Prescriptions:     HYDROcodone-acetaminophen (NORCO) 5-325 mg per tablet, Take 1 Tab by mouth every eight (8) hours as needed for Pain.  Indications: Pain, Disp: , Rfl:     ondansetron (ZOFRAN ODT) 8 mg disintegrating tablet, Take 1 Tab by mouth every eight (8) hours as needed for Nausea., Disp: 10 Tab, Rfl: 0   Date Last Reviewed:  5/16/2017   Number of medical conditions (excluding presenting problem) that affect the Plan of Care: Brief history (0):  LOW COMPLEXITY   ASSESSMENT OF OCCUPATIONAL PERFORMANCE:   RANGE OF MOTION:     · AROM: The patient has limited motion in her right little finger. Measurements not taken due to recent surgery. STRENGTH:  Not tested. SENSATION:  The patient reports numbness in her right little finger tip. Physical Skills Involved:  1. Range of Motion  2. Strength  3. Fine or Gross Motor Coordination  4. Sensation Cognitive Skills Affected (resulting in the inability to perform in a timely and safe manner): 1. none Psychosocial Skills Affected:  1. none   Number of elements that affect the Plan of Care: 3-5:  MODERATE COMPLEXITY   CLINICAL DECISION MAKING:   Outcome Measure: Tool Used: Disabilities of the Arm, Shoulder and Hand (DASH) Questionnaire - Quick Version  Score:  Initial: 43/55  Most Recent: X/55 (Date: -- )   Interpretation of Score: The DASH is designed to measure the activities of daily living in person's with upper extremity dysfunction or pain. Each section is scored on a 1-5 scale, 5 representing the greatest disability. The scores of each section are added together for a total score of 55. Score 11 12-19 20-28 29-37 38-45 46-54 55   Modifier CH CI CJ CK CL CM CN     ? Carrying, Moving, and Handling Objects:     - CURRENT STATUS: CL - 60%-79% impaired, limited or restricted    - GOAL STATUS: CJ - 20%-39% impaired, limited or restricted    - D/C STATUS:  ---------------To be determined---------------    Medical Necessity:   · Patient is expected to demonstrate progress in strength, range of motion and coordination to decrease assistance required with ADL,household and work activities. .  Reason for Services/Other Comments:  · Patient has limited motion,strength and function in her right hand. .  Clinical Decision-Making Assessment:     Clinical Decision-Making: LOW COMPLEXITY   TREATMENT:   (In addition to Assessment/Re-Assessment sessions the following treatments were rendered)  Pre-treatment Symptoms/Complaints:  Pain and stiffness in right little finger. Pain: Initial:   Pain Intensity 1: 3 (increasing to 6 when most intense)  Pain Location 1: Hand  Pain Orientation 1: Right  Post Session:  3     Patient was provided with a fabricated protection splint for her right little finger. Treatment/Session Assessment:    · Response to Treatment:  Patients tolerated treatment well with no complications. Upon completion of treatment, skin condition was normal..  · Compliance with Program/Exercises: Will assess as treatment progresses. · Recommendations/Intent for next treatment session: \"Next visit will focus on advancements to more challenging activities\".   Total Treatment Duration:  OT Patient Time In/Time Out  Time In: 0245  Time Out: [address] [place], OT

## 2017-05-16 NOTE — PROGRESS NOTES
Ambulatory/Rehab Services H2 Model Falls Risk Assessment    Risk Factor Pts. ·   Confusion/Disorientation/Impulsivity  []    4 ·   Symptomatic Depression  []   2 ·   Altered Elimination  []   1 ·   Dizziness/Vertigo  []   1 ·   Gender (Male)  []   1 ·   Any administered antiepileptics (anticonvulsants):  []   2 ·   Any administered benzodiazepines:  []   1 ·   Visual Impairment (specify):  []   1 ·   Portable Oxygen Use  []   1 ·   Orthostatic ? BP  []   1 ·   History of Recent Falls (within 3 mos.)  []   5     Ability to Rise from Chair (choose one) Pts. ·   Ability to rise in a single movement  [x]   0 ·   Pushes up, successful in one attempt  []   1 ·   Multiple attempts, but successful  []   3 ·   Unable to rise without assistance  []   4   Total: (5 or greater = High Risk) 0     Falls Prevention Plan:   []                Physical Limitations to Exercise (specify):   []                Mobility Assistance Device (type):   []                Exercise/Equipment Adaptation (specify):    ©2010 [facility] [address] [place] [address] Patent #[id number].  Federal Law prohibits the replication, distribution or use without written permission from [facility] [employer]

## 2017-06-01 ENCOUNTER — HOSPITAL ENCOUNTER (OUTPATIENT)
Dept: PHYSICAL THERAPY | Age: 52
Discharge: HOME | End: 2017-06-01
Payer: COMMERCIAL

## 2017-06-01 PROCEDURE — 97018 PARAFFIN BATH THERAPY: CPT

## 2017-06-01 PROCEDURE — 97110 THERAPEUTIC EXERCISES: CPT

## 2017-06-01 PROCEDURE — 97140 MANUAL THERAPY 1/> REGIONS: CPT

## 2017-06-01 NOTE — PROGRESS NOTES
Jeet Vogel  : 1965 [facility]  [address], [address] [place] [address], [address] [facility]  [phone]   [phone]         OUTPATIENT OCCUPATIONAL THERAPY: Daily Note 2017    ICD-10: Treatment Diagnosis: Pain in right hand (M79.641)Stiffness of right hand, not elsewhere classified (M25.641)  Precautions/Allergies:   Review of patient's allergies indicates no known allergies. Fall Risk Score: 0 (? 5 = High Risk)  MD Orders: Evaluate and treat: splint to go around pins/S/P ORIF right small finger MEDICAL/REFERRING DIAGNOSIS:   Fracture of unspecified phalanx of unspecified finger, initial encounter for open fracture [S62.609B]   DATE OF ONSET: 2017  DATE OF SURGERY: 5/10/2017   REFERRING PHYSICIAN: Cami Cristobal MD  RETURN PHYSICIAN APPOINTMENT: 2017     INITIAL ASSESSMENT:  Ms. Frankie Grier presents with decreased functional use, strength and range of motion of her right Little finger PIP joint dorsal aspect. and upper extremity that is affecting her independence with activities of daily living and ability to perform job tasks. I feel that Ms. Frankie Grier will benefit from skilled occupational therapy to maximize the functional use of her right\"upper extremity in daily activities and work tasks. PLAN OF CARE:   PROBLEM LIST:  1. Pain in right hand. 2. Decreased motion in right little finger. 3. Decreased strength in right hand. INTERVENTIONS PLANNED:  1. Modalities that may include fluidotherapy, paraffin, ultrasound, and light therapy. 2. Therapeutic exercise including a home exercise program.  3. Manual therapy. 4. Therapeutic activities. TREATMENT PLAN:  Effective Dates: 2017 TO 2017. Frequency/Duration: 1 time a week for 12 weeks  GOALS: (Goals have been discussed and agreed upon with patient.)  Short-Term Functional Goals: Time Frame: 4 weeks  1. Decrease pain to 4 to allow patient to perform self care tasks.   2. Increase motion in right little finger by 15 degrees to improve functional use of upper extremity in ADL activities. 3. Increase strength in right hand by 10 pounds to allow patient to  and lift objects during self care activities. Discharge Goals: Time Frame: 10 weeks  1. Decrease pain to 2 to allow patient to perform all household and work tasks. 2. Increase motion in right little finger by 30 degreees to allow patient to perform all ADL activities. 3. Increase strength in right hand by 20 pounds to allow patient to , lift, hold, and carry heavy objects. Rehabilitation Potential For Stated Goals: Good  Regarding [place] [facility] therapy, I certify that the treatment plan above will be carried out by a therapist or under their direction. Thank you for this referral,  Jayme Jaffe, OT       Referring Physician Signature: Vanessa Cisneros MD _________________________  Date _________            The information in this section was collected on 5/16/2017 (except where otherwise noted). OCCUPATIONAL PROFILE & HISTORY:   History of Present Injury/Illness (Reason for Referral): The patient was bitten by her dog and fractured her right little finger. Past Medical History/Comorbidities:   Ms. Barry Cardenas  has a past medical history of Nausea & vomiting. Ms. Barry Cardenas  has a past surgical history that includes heent; orthopaedic (1990); and orthopaedic (Right). 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  Dominant Side:         RIGHT  Current Medications:    Current Outpatient Prescriptions:     HYDROcodone-acetaminophen (NORCO) 5-325 mg per tablet, Take 1 Tab by mouth every eight (8) hours as needed for Pain.  Indications: Pain, Disp: , Rfl:     ondansetron (ZOFRAN ODT) 8 mg disintegrating tablet, Take 1 Tab by mouth every eight (8) hours as needed for Nausea., Disp: 10 Tab, Rfl: 0   Date Last Reviewed: 6/1/2017    Number of medical conditions (excluding presenting problem) that affect the Plan of Care: Brief history (0):  LOW COMPLEXITY   ASSESSMENT OF OCCUPATIONAL PERFORMANCE:   RANGE OF MOTION:     · AROM: The patient has limited motion in her right little finger. Measurements not taken due to recent surgery. STRENGTH:  Not tested. SENSATION:  The patient reports numbness in her right little finger tip. Physical Skills Involved:  1. Range of Motion  2. Strength  3. Fine or Gross Motor Coordination  4. Sensation Cognitive Skills Affected (resulting in the inability to perform in a timely and safe manner): 1. none Psychosocial Skills Affected:  1. none   Number of elements that affect the Plan of Care: 3-5:  MODERATE COMPLEXITY   CLINICAL DECISION MAKING:   Outcome Measure: Tool Used: Disabilities of the Arm, Shoulder and Hand (DASH) Questionnaire - Quick Version  Score:  Initial: 43/55  Most Recent: X/55 (Date: -- )   Interpretation of Score: The DASH is designed to measure the activities of daily living in person's with upper extremity dysfunction or pain. Each section is scored on a 1-5 scale, 5 representing the greatest disability. The scores of each section are added together for a total score of 55. Score 11 12-19 20-28 29-37 38-45 46-54 55   Modifier CH CI CJ CK CL CM CN     ? Carrying, Moving, and Handling Objects:     - CURRENT STATUS: CL - 60%-79% impaired, limited or restricted    - GOAL STATUS: CJ - 20%-39% impaired, limited or restricted    - D/C STATUS:  ---------------To be determined---------------    Medical Necessity:   · Patient is expected to demonstrate progress in strength, range of motion and coordination to decrease assistance required with ADL,household and work activities. .  Reason for Services/Other Comments:  · Patient has limited motion,strength and function in her right hand. .  Clinical Decision-Making Assessment:     Clinical Decision-Making: LOW COMPLEXITY   TREATMENT:   (In addition to Assessment/Re-Assessment sessions the following treatments were rendered)  Pre-treatment Symptoms/Complaints:  Pain and stiffness in right little finger. Pain: Initial:   Pain Intensity 1: 2 (increasing to 10 if she bumps her finger)  Pain Location 1: Hand  Pain Orientation 1: Right  Post Session:  3     Patient was provided with a fabricated protection splint for her right little finger. Patient stated \"It really hurts if I bump my little finger. \"    Manual Therapy: (Soft Tissue Mobilization Duration  Duration: 15 Minutes  Duration: 15 Minutes): Technique: Retrograde massage  Tissue Mobilized: Scar/adhesion  RUE Soft Tissue Mobilization: Yes   Therapeutic Exercise:                                                                               : The patient's home exercise program was reviewed. Date:  6/1/17 Date: Date: Date: Date:   Activity/Exercise Parameters Parameters Parameters Parameters Parameters   AROM during Fluidotherapy 20 min       Paraffin with Stretch 15 min  flexion         Retrograde massage, Friction Scar massage, Joint Mobilization   15 min       Scarf Curl   5 min       Washer Game        Individual Gripper          Hand [place] S[place]          Cones          Pegs          Clothes Pins          A-R Bar          Exerstick          Blocking boards   5 min       AROM Ex. 5 min       RESISTIVE EXERCISES Weight/ Sets/Reps   Weight/ Sets/Reps Weight/ Sets/Reps Weight/ Sets/Reps Weight/ Sets/Reps   WEIGHT WELL        Sup/Pro        UD/RD        Wrist Flex/Ext        Free Weights          UBE(Minutes)          Nautilus        Compound Row        Vertical Chest        Overhead Press                    HEP: As above; handouts given to patient for all exercises.     Therapeutic Modalities:      Right Wrist Heat  Type: Paraffin bath (with a finger flexion stretch)  Duration : 15 minutes  Patient Position: Sitting Joint Mobilization:        Treatment Times:  · Therapeutic Exercise: 30 minutes  · Manual Therapy: 15 minutes  · Parafin: 15 minutes  · Whirlpool:  minutes  · Other:  minutes     Treatment/Session Assessment:    · Response to Treatment:  Patients tolerated treatment well with no complications. Upon completion of treatment, skin condition was normal..  · Compliance with Program/Exercises: Will assess as treatment progresses. · Recommendations/Intent for next treatment session: \"Next visit will focus on advancements to more challenging activities\". Will continue per MD.  Total Treatment Duration:  OT Patient Time In/Time Out  Time In: 0215  Time Out: [address], OT

## 2017-06-05 ENCOUNTER — HOSPITAL ENCOUNTER (OUTPATIENT)
Dept: PHYSICAL THERAPY | Age: 52
Discharge: HOME | End: 2017-06-05
Payer: COMMERCIAL

## 2017-06-06 ENCOUNTER — HOSPITAL ENCOUNTER (OUTPATIENT)
Dept: PHYSICAL THERAPY | Age: 52
Discharge: HOME | End: 2017-06-06
Payer: COMMERCIAL

## 2017-06-08 ENCOUNTER — HOSPITAL ENCOUNTER (OUTPATIENT)
Dept: PHYSICAL THERAPY | Age: 52
Discharge: HOME | End: 2017-06-08
Payer: COMMERCIAL

## 2017-06-08 PROCEDURE — 97018 PARAFFIN BATH THERAPY: CPT

## 2017-06-08 PROCEDURE — 97140 MANUAL THERAPY 1/> REGIONS: CPT

## 2017-06-08 PROCEDURE — 97110 THERAPEUTIC EXERCISES: CPT

## 2017-06-08 NOTE — PROGRESS NOTES
Nona Warner  : 1965 [facility] [place] [address], [address] [facility]  [phone]   [phone]         OUTPATIENT OCCUPATIONAL THERAPY: Daily Note 2017    ICD-10: Treatment Diagnosis: Pain in right hand (M79.641)Stiffness of right hand, not elsewhere classified (M25.641)  Precautions/Allergies:   Review of patient's allergies indicates no known allergies. Fall Risk Score: 0 (? 5 = High Risk)  MD Orders: Evaluate and treat: splint to go around pins/S/P ORIF right small finger MEDICAL/REFERRING DIAGNOSIS:   Fracture of unspecified phalanx of unspecified finger, initial encounter for open fracture [S62.609B]   DATE OF ONSET: 2017  DATE OF SURGERY: 5/10/2017   REFERRING PHYSICIAN: Gila Parham MD  RETURN PHYSICIAN APPOINTMENT: 2017     INITIAL ASSESSMENT:  Ms. Chito Vega presents with decreased functional use, strength and range of motion of her right Little finger PIP joint dorsal aspect. and upper extremity that is affecting her independence with activities of daily living and ability to perform job tasks. I feel that Ms. Chito Vega will benefit from skilled occupational therapy to maximize the functional use of her right\"upper extremity in daily activities and work tasks. PLAN OF CARE:   PROBLEM LIST:  1. Pain in right hand. 2. Decreased motion in right little finger. 3. Decreased strength in right hand. INTERVENTIONS PLANNED:  1. Modalities that may include fluidotherapy, paraffin, ultrasound, and light therapy. 2. Therapeutic exercise including a home exercise program.  3. Manual therapy. 4. Therapeutic activities. TREATMENT PLAN:  Effective Dates: 2017 TO 2017. Frequency/Duration: 1 time a week for 12 weeks  GOALS: (Goals have been discussed and agreed upon with patient.)  Short-Term Functional Goals: Time Frame: 4 weeks  1. Decrease pain to 4 to allow patient to perform self care tasks.   2. Increase motion in right little finger by 15 degrees to improve functional use of upper extremity in ADL activities. 3. Increase strength in right hand by 10 pounds to allow patient to  and lift objects during self care activities. Discharge Goals: Time Frame: 10 weeks  1. Decrease pain to 2 to allow patient to perform all household and work tasks. 2. Increase motion in right little finger by 30 degreees to allow patient to perform all ADL activities. 3. Increase strength in right hand by 20 pounds to allow patient to , lift, hold, and carry heavy objects. Rehabilitation Potential For Stated Goals: Good  Regarding [place] therapy, I certify that the treatment plan above will be carried out by a therapist or under their direction. Thank you for this referral,  Apurva Combs, OT       Referring Physician Signature: Mariah Wolfe MD _________________________  Date _________            The information in this section was collected on 5/16/2017 (except where otherwise noted). OCCUPATIONAL PROFILE & HISTORY:   History of Present Injury/Illness (Reason for Referral): The patient was bitten by her dog and fractured her right little finger. Past Medical History/Comorbidities:   Ms. Gunnar Mcintyre  has a past medical history of Nausea & vomiting. Ms. Gunnar Mcintyre  has a past surgical history that includes heent; orthopaedic (1990); and orthopaedic (Right). 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  Dominant Side:         RIGHT  Current Medications:    Current Outpatient Prescriptions:     HYDROcodone-acetaminophen (NORCO) 5-325 mg per tablet, Take 1 Tab by mouth every eight (8) hours as needed for Pain.  Indications: Pain, Disp: , Rfl:     ondansetron (ZOFRAN ODT) 8 mg disintegrating tablet, Take 1 Tab by mouth every eight (8) hours as needed for Nausea., Disp: 10 Tab, Rfl: 0   Date Last Reviewed: 6/8/2017    Number of medical conditions (excluding presenting problem) that affect the Plan of Care: Brief history (0):  LOW COMPLEXITY   ASSESSMENT OF OCCUPATIONAL PERFORMANCE:   RANGE OF MOTION:     · AROM: The patient has limited motion in her right little finger. Measurements not taken due to recent surgery. STRENGTH:  Not tested. SENSATION:  The patient reports numbness in her right little finger tip. Physical Skills Involved:  1. Range of Motion  2. Strength  3. Fine or Gross Motor Coordination  4. Sensation Cognitive Skills Affected (resulting in the inability to perform in a timely and safe manner): 1. none Psychosocial Skills Affected:  1. none   Number of elements that affect the Plan of Care: 3-5:  MODERATE COMPLEXITY   CLINICAL DECISION MAKING:   Outcome Measure: Tool Used: Disabilities of the Arm, Shoulder and Hand (DASH) Questionnaire - Quick Version  Score:  Initial: 43/55  Most Recent: X/55 (Date: -- )   Interpretation of Score: The DASH is designed to measure the activities of daily living in person's with upper extremity dysfunction or pain. Each section is scored on a 1-5 scale, 5 representing the greatest disability. The scores of each section are added together for a total score of 55. Score 11 12-19 20-28 29-37 38-45 46-54 55   Modifier CH CI CJ CK CL CM CN     ? Carrying, Moving, and Handling Objects:     - CURRENT STATUS: CL - 60%-79% impaired, limited or restricted    - GOAL STATUS: CJ - 20%-39% impaired, limited or restricted    - D/C STATUS:  ---------------To be determined---------------    Medical Necessity:   · Patient is expected to demonstrate progress in strength, range of motion and coordination to decrease assistance required with ADL,household and work activities. .  Reason for Services/Other Comments:  · Patient has limited motion,strength and function in her right hand. .  Clinical Decision-Making Assessment:     Clinical Decision-Making: LOW COMPLEXITY   TREATMENT:   (In addition to Assessment/Re-Assessment sessions the following treatments were rendered)  Pre-treatment Symptoms/Complaints:  Pain and stiffness in right little finger. Pain: Initial:   Pain Intensity 1: 4  Pain Location 1: Hand  Pain Orientation 1: Right  Post Session:  3     Patient was provided with a fabricated protection splint for her right little finger. Patient stated: \" My finger is so stiff. \"    Manual Therapy: (Soft Tissue Mobilization Duration  Duration: 15 Minutes  Duration: 15 Minutes): Technique: Retrograde massage (followed by PROM)  Tissue Mobilized: Scar/adhesion  Finger Mobilized: 5th digit  RUE Soft Tissue Mobilization: Yes   Therapeutic Exercise:                                                                               : The patient's home exercise program was reviewed. Date:  6/1/17 Date:  6/8/17 Date: Date: Date:   Activity/Exercise Parameters Parameters Parameters Parameters Parameters   AROM during Fluidotherapy 20 min 20 min      Paraffin with Stretch 15 min  flexion   15 min  flexion      Retrograde massage, Friction Scar massage, Joint Mobilization   15 min 15 min      Scarf Curl   5 min 5 min      Washer Game        Individual Gripper          Hand Fontana          Cones          Pegs          Clothes Pins          A-R Bar          Exerstick    3 min      Blocking boards   5 min 5 min      AROM Ex. 5 min 5 min      RESISTIVE EXERCISES Weight/ Sets/Reps   Weight/ Sets/Reps Weight/ Sets/Reps Weight/ Sets/Reps Weight/ Sets/Reps   WEIGHT WELL        Sup/Pro        UD/RD        Wrist Flex/Ext        Free Weights          UBE(Minutes)          Nautilus        Compound Row        Vertical Chest        Overhead Press                    HEP: As above; handouts given to patient for all exercises.     Therapeutic Modalities:      Right Wrist Heat  Type: Paraffin bath (with a finger flexion stretch)  Duration : 15 minutes  Patient Position: Sitting                                        Joint Mobilization:        Treatment Times:  · Therapeutic Exercise: 30 minutes  · Manual Therapy: 15 minutes  · Parafin: 15 minutes  · Whirlpool:  minutes  · Other:  minutes     Treatment/Session Assessment:    · Response to Treatment:  Patients tolerated treatment well with no complications. Upon completion of treatment, skin condition was normal..  · Compliance with Program/Exercises: Will assess as treatment progresses. · Recommendations/Intent for next treatment session: \"Next visit will focus on advancements to more challenging activities\". Will continue per MD.  Total Treatment Duration:  OT Patient Time In/Time Out  Time In: 2201  Time Out: [address]ik 5026, OT

## 2017-06-14 ENCOUNTER — HOSPITAL ENCOUNTER (OUTPATIENT)
Dept: PHYSICAL THERAPY | Age: 52
Discharge: HOME | End: 2017-06-14
Payer: COMMERCIAL

## 2017-06-14 PROCEDURE — 97018 PARAFFIN BATH THERAPY: CPT

## 2017-06-14 PROCEDURE — 97140 MANUAL THERAPY 1/> REGIONS: CPT

## 2017-06-14 PROCEDURE — 97110 THERAPEUTIC EXERCISES: CPT

## 2017-06-14 NOTE — PROGRESS NOTES
Zaira Drew  : 1965 [facility]  [address], [address] [address],[address], [place] [address].  [phone]   [phone]         OUTPATIENT OCCUPATIONAL THERAPY: Daily Note 2017    ICD-10: Treatment Diagnosis: Pain in right hand (M79.641)Stiffness of right hand, not elsewhere classified (M25.641)  Precautions/Allergies:   Review of patient's allergies indicates no known allergies. Fall Risk Score: 0 (? 5 = High Risk)  MD Orders: Evaluate and treat: splint to go around pins/S/P ORIF right small finger MEDICAL/REFERRING DIAGNOSIS:   Fracture of unspecified phalanx of unspecified finger, initial encounter for open fracture [S62.609B]   DATE OF ONSET: 2017  DATE OF SURGERY: 5/10/2017   REFERRING PHYSICIAN: Cricket Ordoñez MD  RETURN PHYSICIAN APPOINTMENT: 2017     INITIAL ASSESSMENT:  Ms. Yelena Gill presents with decreased functional use, strength and range of motion of her right Little finger PIP joint dorsal aspect. and upper extremity that is affecting her independence with activities of daily living and ability to perform job tasks. I feel that Ms. Yelena Gill will benefit from skilled occupational therapy to maximize the functional use of her right\"upper extremity in daily activities and work tasks. PLAN OF CARE:   PROBLEM LIST:  1. Pain in right hand. 2. Decreased motion in right little finger. 3. Decreased strength in right hand. INTERVENTIONS PLANNED:  1. Modalities that may include fluidotherapy, paraffin, ultrasound, and light therapy. 2. Therapeutic exercise including a home exercise program.  3. Manual therapy. 4. Therapeutic activities. TREATMENT PLAN:  Effective Dates: 2017 TO 2017. Frequency/Duration: 1 time a week for 12 weeks  GOALS: (Goals have been discussed and agreed upon with patient.)  Short-Term Functional Goals: Time Frame: 4 weeks  1. Decrease pain to 4 to allow patient to perform self care tasks.   2. Increase motion in right little finger by 15 degrees to improve functional use of upper extremity in ADL activities. 3. Increase strength in right hand by 10 pounds to allow patient to  and lift objects during self care activities. Discharge Goals: Time Frame: 10 weeks  1. Decrease pain to 2 to allow patient to perform all household and work tasks. 2. Increase motion in right little finger by 30 degreees to allow patient to perform all ADL activities. 3. Increase strength in right hand by 20 pounds to allow patient to , lift, hold, and carry heavy objects. Rehabilitation Potential For Stated Goals: Good  Regarding Ga Mccallum therapy, I certify that the treatment plan above will be carried out by a therapist or under their direction. Thank you for this referral,  Pao Herrera, OT       Referring Physician Signature: Barbie Smith MD _________________________  Date _________            The information in this section was collected on 5/16/2017 (except where otherwise noted). OCCUPATIONAL PROFILE & HISTORY:   History of Present Injury/Illness (Reason for Referral): The patient was bitten by her dog and fractured her right little finger. Past Medical History/Comorbidities:   Ms. Bishnu Harrison  has a past medical history of Nausea & vomiting. Ms. Bishnu Harrison  has a past surgical history that includes heent; orthopaedic (1990); and orthopaedic (Right). 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  Dominant Side:         RIGHT  Current Medications:    Current Outpatient Prescriptions:     HYDROcodone-acetaminophen (NORCO) 5-325 mg per tablet, Take 1 Tab by mouth every eight (8) hours as needed for Pain.  Indications: Pain, Disp: , Rfl:     ondansetron (ZOFRAN ODT) 8 mg disintegrating tablet, Take 1 Tab by mouth every eight (8) hours as needed for Nausea., Disp: 10 Tab, Rfl: 0   Date Last Reviewed: 6/14/2017    Number of medical conditions (excluding presenting problem) that affect the Plan of Care: Brief history (0):  LOW COMPLEXITY   ASSESSMENT OF OCCUPATIONAL PERFORMANCE:   RANGE OF MOTION:     · AROM: The patient has limited motion in her right little finger. Measurements not taken due to recent surgery. STRENGTH:  Not tested. SENSATION:  The patient reports numbness in her right little finger tip. Physical Skills Involved:  1. Range of Motion  2. Strength  3. Fine or Gross Motor Coordination  4. Sensation Cognitive Skills Affected (resulting in the inability to perform in a timely and safe manner): 1. none Psychosocial Skills Affected:  1. none   Number of elements that affect the Plan of Care: 3-5:  MODERATE COMPLEXITY   CLINICAL DECISION MAKING:   Outcome Measure: Tool Used: Disabilities of the Arm, Shoulder and Hand (DASH) Questionnaire - Quick Version  Score:  Initial: 43/55  Most Recent: X/55 (Date: -- )   Interpretation of Score: The DASH is designed to measure the activities of daily living in person's with upper extremity dysfunction or pain. Each section is scored on a 1-5 scale, 5 representing the greatest disability. The scores of each section are added together for a total score of 55. Score 11 12-19 20-28 29-37 38-45 46-54 55   Modifier CH CI CJ CK CL CM CN     ? Carrying, Moving, and Handling Objects:     - CURRENT STATUS: CL - 60%-79% impaired, limited or restricted    - GOAL STATUS: CJ - 20%-39% impaired, limited or restricted    - D/C STATUS:  ---------------To be determined---------------    Medical Necessity:   · Patient is expected to demonstrate progress in strength, range of motion and coordination to decrease assistance required with ADL,household and work activities. .  Reason for Services/Other Comments:  · Patient has limited motion,strength and function in her right hand. .  Clinical Decision-Making Assessment:     Clinical Decision-Making: LOW COMPLEXITY   TREATMENT:   (In addition to Assessment/Re-Assessment sessions the following treatments were rendered)  Pre-treatment Symptoms/Complaints:  Pain and stiffness in right little finger. Pain: Initial:   Pain Intensity 1: 3  Pain Location 1: Hand (little finger)  Pain Orientation 1: Right  Post Session:  3     Patient was provided with a fabricated protection splint for her right little finger. Patient stated: \" My little finger looks so deformed\"    Manual Therapy: (Soft Tissue Mobilization Duration  Duration: 15 Minutes  Duration: 15 Minutes): Technique: Retrograde massage (followed by PROM)  Tissue Mobilized: Scar/adhesion  Finger Mobilized: 5th digit  RUE Soft Tissue Mobilization: Yes   Therapeutic Exercise:                                                                               : The patient's home exercise program was reviewed. Date:  6/1/17 Date:  6/8/17 Date:  6/14/17 Date: Date:   Activity/Exercise Parameters Parameters Parameters Parameters Parameters   AROM during Fluidotherapy 20 min 20 min 20 min     Paraffin with Stretch 15 min  flexion   15 min  flexion 15 min  flexion     Retrograde massage, Friction Scar massage, Joint Mobilization   15 min 15 min 15 min     Scarf Curl   5 min 5 min 5 min     Washer Game        Individual Gripper          Hand [place]          [place]          Pegs          Clothes Pins          A-R Bar          Exerstick    3 min 3 min     Blocking boards   5 min 5 min 5 min     AROM Ex. 5 min 5 min 3 min     RESISTIVE EXERCISES Weight/ Sets/Reps   Weight/ Sets/Reps Weight/ Sets/Reps Weight/ Sets/Reps Weight/ Sets/Reps   WEIGHT WELL        Sup/Pro        UD/RD        Wrist Flex/Ext        Free Weights          UBE(Minutes)          Nautilus        Compound Row        Vertical Chest        Overhead Press                    HEP: As above; handouts given to patient for all exercises.     Therapeutic Modalities:      Right Wrist Heat  Type: Paraffin bath (with a finger flexion stretch)  Duration : 15 minutes  Patient Position: Sitting                                        Joint Mobilization:        Treatment Times:  · Therapeutic Exercise: 30 minutes  · Manual Therapy: 15 minutes  · Parafin: 15 minutes  · Whirlpool:  minutes  · Other:  minutes     Treatment/Session Assessment:    · Response to Treatment:  Patients tolerated treatment well with no complications. Upon completion of treatment, skin condition was normal..  · Compliance with Program/Exercises: Will assess as treatment progresses. · Recommendations/Intent for next treatment session: \"Next visit will focus on advancements to more challenging activities\". Will continue per MD.  Total Treatment Duration:  OT Patient Time In/Time Out  Time In: 0115  Time Out: AMITA Warren

## 2017-06-20 ENCOUNTER — HOSPITAL ENCOUNTER (OUTPATIENT)
Dept: PHYSICAL THERAPY | Age: 52
Discharge: HOME | End: 2017-06-20
Payer: COMMERCIAL

## 2017-06-22 ENCOUNTER — HOSPITAL ENCOUNTER (OUTPATIENT)
Dept: PHYSICAL THERAPY | Age: 52
Discharge: HOME | End: 2017-06-22
Payer: COMMERCIAL

## 2017-06-22 PROCEDURE — 97018 PARAFFIN BATH THERAPY: CPT

## 2017-06-22 PROCEDURE — 97140 MANUAL THERAPY 1/> REGIONS: CPT

## 2017-06-22 PROCEDURE — 97110 THERAPEUTIC EXERCISES: CPT

## 2017-06-22 NOTE — PROGRESS NOTES
Bernardino Thibodeaux  : 1965 [facility] [address], [address] [place] [address],[address] [facility], [facility].  [phone]   [phone]         OUTPATIENT OCCUPATIONAL THERAPY: Daily Note 2017    ICD-10: Treatment Diagnosis: Pain in right hand (M79.641)Stiffness of right hand, not elsewhere classified (M25.641)  Precautions/Allergies:   Review of patient's allergies indicates no known allergies. Fall Risk Score: 0 (? 5 = High Risk)  MD Orders: Evaluate and treat: splint to go around pins/S/P ORIF right small finger MEDICAL/REFERRING DIAGNOSIS:   Fracture of unspecified phalanx of unspecified finger, initial encounter for open fracture [S62.609B]   DATE OF ONSET: 2017  DATE OF SURGERY: 5/10/2017   REFERRING PHYSICIAN: David Mcgregor MD  RETURN PHYSICIAN APPOINTMENT: 2017     INITIAL ASSESSMENT:  Ms. Julia Tracy presents with decreased functional use, strength and range of motion of her right Little finger PIP joint dorsal aspect. and upper extremity that is affecting her independence with activities of daily living and ability to perform job tasks. I feel that Ms. Julia Tracy will benefit from skilled occupational therapy to maximize the functional use of her right\"upper extremity in daily activities and work tasks. PLAN OF CARE:   PROBLEM LIST:  1. Pain in right hand. 2. Decreased motion in right little finger. 3. Decreased strength in right hand. INTERVENTIONS PLANNED:  1. Modalities that may include fluidotherapy, paraffin, ultrasound, and light therapy. 2. Therapeutic exercise including a home exercise program.  3. Manual therapy. 4. Therapeutic activities. TREATMENT PLAN:  Effective Dates: 2017 TO 2017. Frequency/Duration: 1 time a week for 12 weeks  GOALS: (Goals have been discussed and agreed upon with patient.)  Short-Term Functional Goals: Time Frame: 4 weeks  1. Decrease pain to 4 to allow patient to perform self care tasks.   2. Increase motion in right little finger by 15 degrees to improve functional use of upper extremity in ADL activities. 3. Increase strength in right hand by 10 pounds to allow patient to  and lift objects during self care activities. Discharge Goals: Time Frame: 10 weeks  1. Decrease pain to 2 to allow patient to perform all household and work tasks. 2. Increase motion in right little finger by 30 degreees to allow patient to perform all ADL activities. 3. Increase strength in right hand by 20 pounds to allow patient to , lift, hold, and carry heavy objects. Rehabilitation Potential For Stated Goals: Good  Regarding Achilles Baxter therapy, I certify that the treatment plan above will be carried out by a therapist or under their direction. Thank you for this referral,  Apurva Combs, OT       Referring Physician Signature: Mariah Wolfe MD _________________________  Date _________            The information in this section was collected on 5/16/2017 (except where otherwise noted). OCCUPATIONAL PROFILE & HISTORY:   History of Present Injury/Illness (Reason for Referral): The patient was bitten by her dog and fractured her right little finger. Past Medical History/Comorbidities:   Ms. Gunnar Mcintyre  has a past medical history of Nausea & vomiting. Ms. Gunnar Mcintyre  has a past surgical history that includes heent; orthopaedic (1990); and orthopaedic (Right). 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  Dominant Side:         RIGHT  Current Medications:    Current Outpatient Prescriptions:     HYDROcodone-acetaminophen (NORCO) 5-325 mg per tablet, Take 1 Tab by mouth every eight (8) hours as needed for Pain.  Indications: Pain, Disp: , Rfl:     ondansetron (ZOFRAN ODT) 8 mg disintegrating tablet, Take 1 Tab by mouth every eight (8) hours as needed for Nausea., Disp: 10 Tab, Rfl: 0   Date Last Reviewed: 6/22/2017    Number of medical conditions (excluding presenting problem) that affect the Plan of Care: Brief history (0):  LOW COMPLEXITY   ASSESSMENT OF OCCUPATIONAL PERFORMANCE:   RANGE OF MOTION:     · AROM: The patient has limited motion in her right little finger. Measurements not taken due to recent surgery. STRENGTH:  Not tested. SENSATION:  The patient reports numbness in her right little finger tip. Physical Skills Involved:  1. Range of Motion  2. Strength  3. Fine or Gross Motor Coordination  4. Sensation Cognitive Skills Affected (resulting in the inability to perform in a timely and safe manner): 1. none Psychosocial Skills Affected:  1. none   Number of elements that affect the Plan of Care: 3-5:  MODERATE COMPLEXITY   CLINICAL DECISION MAKING:   Outcome Measure: Tool Used: Disabilities of the Arm, Shoulder and Hand (DASH) Questionnaire - Quick Version  Score:  Initial: 43/55  Most Recent: X/55 (Date: -- )   Interpretation of Score: The DASH is designed to measure the activities of daily living in person's with upper extremity dysfunction or pain. Each section is scored on a 1-5 scale, 5 representing the greatest disability. The scores of each section are added together for a total score of 55. Score 11 12-19 20-28 29-37 38-45 46-54 55   Modifier CH CI CJ CK CL CM CN     ? Carrying, Moving, and Handling Objects:     - CURRENT STATUS: CL - 60%-79% impaired, limited or restricted    - GOAL STATUS: CJ - 20%-39% impaired, limited or restricted    - D/C STATUS:  ---------------To be determined---------------    Medical Necessity:   · Patient is expected to demonstrate progress in strength, range of motion and coordination to decrease assistance required with ADL,household and work activities. .  Reason for Services/Other Comments:  · Patient has limited motion,strength and function in her right hand. .  Clinical Decision-Making Assessment:     Clinical Decision-Making: LOW COMPLEXITY   TREATMENT:   (In addition to Assessment/Re-Assessment sessions the following treatments were rendered)  Pre-treatment Symptoms/Complaints:  Pain and stiffness in right little finger. Pain: Initial:   Pain Intensity 1: 5  Pain Location 1: Hand  Pain Orientation 1: Right  Post Session:  4     Patient was provided with a fabricated protection splint for her right little finger. Patient stated: \" I see Dr. Christin Gregorio on Monday. \"    Manual Therapy: (Soft Tissue Mobilization Duration  Duration: 15 Minutes  Duration: 15 Minutes): Technique: Retrograde massage (followed by PROM)  Tissue Mobilized: Scar/adhesion  Finger Mobilized: 5th digit  RUE Soft Tissue Mobilization: Yes   Therapeutic Exercise:                                                                               : The patient's home exercise program was reviewed.                                                 Date:  6/1/17 Date:  6/8/17 Date:  6/14/17 Date:  6/22/17 Date:   Activity/Exercise Parameters Parameters Parameters Parameters Parameters   AROM during Fluidotherapy 20 min 20 min 20 min 20 min    Paraffin with Stretch 15 min  flexion   15 min  flexion 15 min  flexion 15 min  flexion    Retrograde massage, Friction Scar massage, Joint Mobilization   15 min 15 min 15 min 15 min    Scarf Curl   5 min 5 min 5 min 5 min    Washer Game        Individual Gripper          Hand [place]          putty      5 min    Pegs          Clothes Pins          A-R Bar          Exerstick    3 min 3 min 3 min    Blocking boards   5 min 5 min 5 min 3 min    PROM Ex. 5 min 5 min 3 min 3 min    RESISTIVE EXERCISES Weight/ Sets/Reps   Weight/ Sets/Reps Weight/ Sets/Reps Weight/ Sets/Reps Weight/ Sets/Reps   WEIGHT WELL        Sup/Pro        UD/RD        Wrist Flex/Ext        Free Weights          UBE(Minutes)          Nautilus        Compound Row        Vertical Chest        Overhead Press                    HEP: As above; handouts given to patient for all exercises. Therapeutic Modalities:      Right Wrist Heat  Type: Paraffin bath (with a little finger flexion stretch)  Duration : 15 minutes  Patient Position: Sitting                                        Joint Mobilization:        Treatment Times:  · Therapeutic Exercise: 30 minutes  · Manual Therapy: 15 minutes  · Parafin: 15 minutes  · Whirlpool:  minutes  · Other:  minutes     Treatment/Session Assessment:    · Response to Treatment:  Patients tolerated treatment well with no complications. Upon completion of treatment, skin condition was normal..  · Compliance with Program/Exercises: Will assess as treatment progresses. · Recommendations/Intent for next treatment session: \"Next visit will focus on advancements to more challenging activities\"MD progress note completed. .Will continue per MD.  Total Treatment Duration:  OT Patient Time In/Time Out  Time In: 0200  Time Out: [address], OT

## 2017-06-27 ENCOUNTER — HOSPITAL ENCOUNTER (OUTPATIENT)
Dept: PHYSICAL THERAPY | Age: 52
Discharge: HOME | End: 2017-06-27
Payer: COMMERCIAL

## 2017-07-05 ENCOUNTER — HOSPITAL ENCOUNTER (OUTPATIENT)
Dept: PHYSICAL THERAPY | Age: 52
Discharge: HOME | End: 2017-07-05
Payer: COMMERCIAL

## 2017-07-05 PROCEDURE — 97110 THERAPEUTIC EXERCISES: CPT

## 2017-07-05 PROCEDURE — 97140 MANUAL THERAPY 1/> REGIONS: CPT

## 2017-07-05 PROCEDURE — 97018 PARAFFIN BATH THERAPY: CPT

## 2017-07-06 NOTE — PROGRESS NOTES
Isabel Hamm  : 1965 [facility] at [address], [address] [place] [address],[address], [place] [address].  [phone]   [phone]         OUTPATIENT OCCUPATIONAL THERAPY: Daily Note 2017   ICD-10: Treatment Diagnosis: Pain in right hand (M79.641)Stiffness of right hand, not elsewhere classified (M25.641)  Precautions/Allergies:   Review of patient's allergies indicates no known allergies. Fall Risk Score: 0 (? 5 = High Risk)  MD Orders: Evaluate and treat: splint to go around pins/S/P ORIF right small finger MEDICAL/REFERRING DIAGNOSIS:   Fracture of unspecified phalanx of unspecified finger, initial encounter for open fracture [S62.609B]   DATE OF ONSET: 2017  DATE OF SURGERY: 5/10/2017   REFERRING PHYSICIAN: Priscila Osorio MD  RETURN PHYSICIAN APPOINTMENT: 2017     INITIAL ASSESSMENT:  Ms. Isak Faustin presents with decreased functional use, strength and range of motion of her right Little finger PIP joint dorsal aspect. and upper extremity that is affecting her independence with activities of daily living and ability to perform job tasks. I feel that Ms. Isak Faustin will benefit from skilled occupational therapy to maximize the functional use of her right\"upper extremity in daily activities and work tasks. PLAN OF CARE:   PROBLEM LIST:  1. Pain in right hand. 2. Decreased motion in right little finger. 3. Decreased strength in right hand. INTERVENTIONS PLANNED:  1. Modalities that may include fluidotherapy, paraffin, ultrasound, and light therapy. 2. Therapeutic exercise including a home exercise program.  3. Manual therapy. 4. Therapeutic activities. TREATMENT PLAN:  Effective Dates: 2017 TO 2017. Frequency/Duration: 1 time a week for 12 weeks  GOALS: (Goals have been discussed and agreed upon with patient.)  Short-Term Functional Goals: Time Frame: 4 weeks  1. Decrease pain to 4 to allow patient to perform self care tasks.   2. Increase motion in right little finger by 15 degrees to improve functional use of upper extremity in ADL activities. 3. Increase strength in right hand by 10 pounds to allow patient to  and lift objects during self care activities. Discharge Goals: Time Frame: 10 weeks  1. Decrease pain to 2 to allow patient to perform all household and work tasks. 2. Increase motion in right little finger by 30 degreees to allow patient to perform all ADL activities. 3. Increase strength in right hand by 20 pounds to allow patient to , lift, hold, and carry heavy objects. Rehabilitation Potential For Stated Goals: Good  Regarding Torsten Olives therapy, I certify that the treatment plan above will be carried out by a therapist or under their direction. Thank you for this referral,  Guicho Aviles OT       Referring Physician Signature: Abdias Garcia MD _________________________  Date _________            The information in this section was collected on 5/16/2017 (except where otherwise noted). OCCUPATIONAL PROFILE & HISTORY:   History of Present Injury/Illness (Reason for Referral): The patient was bitten by her dog and fractured her right little finger. Past Medical History/Comorbidities:   Ms. Batsheva Khan  has a past medical history of Nausea & vomiting. Ms. Batsheva Khan  has a past surgical history that includes heent; orthopaedic (1990); and orthopaedic (Right). 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  Dominant Side:         RIGHT  Current Medications:    Current Outpatient Prescriptions:     HYDROcodone-acetaminophen (NORCO) 5-325 mg per tablet, Take 1 Tab by mouth every eight (8) hours as needed for Pain.  Indications: Pain, Disp: , Rfl:     ondansetron (ZOFRAN ODT) 8 mg disintegrating tablet, Take 1 Tab by mouth every eight (8) hours as needed for Nausea., Disp: 10 Tab, Rfl: 0   Date Last Reviewed: 7/6/2017    Number of medical conditions (excluding presenting problem) that affect the Plan of Care: Brief history (0):  LOW COMPLEXITY   ASSESSMENT OF OCCUPATIONAL PERFORMANCE:   RANGE OF MOTION:     · AROM: The patient has limited motion in her right little finger. Measurements not taken due to recent surgery. STRENGTH:  Not tested. SENSATION:  The patient reports numbness in her right little finger tip. Physical Skills Involved:  1. Range of Motion  2. Strength  3. Fine or Gross Motor Coordination  4. Sensation Cognitive Skills Affected (resulting in the inability to perform in a timely and safe manner): 1. none Psychosocial Skills Affected:  1. none   Number of elements that affect the Plan of Care: 3-5:  MODERATE COMPLEXITY   CLINICAL DECISION MAKING:   Outcome Measure: Tool Used: Disabilities of the Arm, Shoulder and Hand (DASH) Questionnaire - Quick Version  Score:  Initial: 43/55  Most Recent: X/55 (Date: -- )   Interpretation of Score: The DASH is designed to measure the activities of daily living in person's with upper extremity dysfunction or pain. Each section is scored on a 1-5 scale, 5 representing the greatest disability. The scores of each section are added together for a total score of 55. Score 11 12-19 20-28 29-37 38-45 46-54 55   Modifier CH CI CJ CK CL CM CN     ? Carrying, Moving, and Handling Objects:     - CURRENT STATUS: CL - 60%-79% impaired, limited or restricted    - GOAL STATUS: CJ - 20%-39% impaired, limited or restricted    - D/C STATUS:  ---------------To be determined---------------    Medical Necessity:   · Patient is expected to demonstrate progress in strength, range of motion and coordination to decrease assistance required with ADL,household and work activities. .  Reason for Services/Other Comments:  · Patient has limited motion,strength and function in her right hand. .  Clinical Decision-Making Assessment:     Clinical Decision-Making: LOW COMPLEXITY   TREATMENT:   (In addition to Assessment/Re-Assessment sessions the following treatments were rendered)  Pre-treatment Symptoms/Complaints:  Pain and stiffness in right little finger. Pain: Initial:   Pain Intensity 1: 3  Pain Location 1: Hand (little finger)  Pain Orientation 1: Right  Post Session:  3     Patient was provided with a fabricated protection splint for her right little finger. Patient stated: \"My finger is just not moving much. \"    Manual Therapy: (Soft Tissue Mobilization Duration  Duration: 15 Minutes  Duration: 15 Minutes): Technique: Retrograde massage (followed by PROM)  Tissue Mobilized: Scar/adhesion  RUE Soft Tissue Mobilization: Yes   Therapeutic Exercise:                                                                               : The patient's home exercise program was reviewed. Date:  6/1/17 Date:  6/8/17 Date:  6/14/17 Date:  6/22/17 Date:  7/5/17   Activity/Exercise Parameters Parameters Parameters Parameters Parameters   AROM during Fluidotherapy 20 min 20 min 20 min 20 min 20 min   Paraffin with Stretch 15 min  flexion   15 min  flexion 15 min  flexion 15 min  flexion 15 min  PIP ext.    Retrograde massage, Friction Scar massage, Joint Mobilization   15 min 15 min 15 min 15 min 15 min   Scarf Curl   5 min 5 min 5 min 5 min 5 min   Washer Game        Individual Gripper       20 reps   Hand [place]       20 reps   putty      5 min    [place]          Clothes Pins       20 reps   A-R Bar          Exerstick    3 min 3 min 3 min 3 min   Blocking boards   5 min 5 min 5 min 3 min 3 min   PROM Ex. 5 min 5 min 3 min 3 min    RESISTIVE EXERCISES Weight/ Sets/Reps   Weight/ Sets/Reps Weight/ Sets/Reps Weight/ Sets/Reps Weight/ Sets/Reps   WEIGHT WELL        Sup/Pro        UD/RD        Wrist Flex/Ext        Free Weights          UBE(Minutes)          Nautilus        Compound Row        Vertical Chest        [employer] HEP: As above; handouts given to patient for all exercises. Therapeutic Modalities:      Right Wrist Heat  Type: Paraffin bath (with a little finger PIP extension stretch)  Duration : 15 minutes  Patient Position: Sitting                                        Joint Mobilization:        Treatment Times:  · Therapeutic Exercise: 30 minutes  · Manual Therapy: 15 minutes  · Parafin: 15 minutes  · Whirlpool:  minutes  · Other:  minutes     Treatment/Session Assessment:    · Response to Treatment:  Patients tolerated treatment well with no complications. Upon completion of treatment, skin condition was normal..  · Compliance with Program/Exercises: Will assess as treatment progresses. · Recommendations/Intent for next treatment session: \"Next visit will focus on advancements to more challenging activities\". .Will continue per MD.  Total Treatment Duration:  OT Patient Time In/Time Out  Time In: 0200  Time Out: [address], OT

## 2017-07-07 ENCOUNTER — HOSPITAL ENCOUNTER (OUTPATIENT)
Dept: PHYSICAL THERAPY | Age: 52
Discharge: HOME | End: 2017-07-07
Payer: COMMERCIAL

## 2017-07-07 PROCEDURE — 97140 MANUAL THERAPY 1/> REGIONS: CPT

## 2017-07-07 PROCEDURE — 97110 THERAPEUTIC EXERCISES: CPT

## 2017-07-07 PROCEDURE — 97018 PARAFFIN BATH THERAPY: CPT

## 2017-07-07 NOTE — PROGRESS NOTES
Kusum Almendarez  : 1965 [facility] at [address], [address] [place] [address],[address], [place] [address].  [phone]   [phone]         OUTPATIENT OCCUPATIONAL THERAPY: Daily Note 2017   ICD-10: Treatment Diagnosis: Pain in right hand (M79.641)Stiffness of right hand, not elsewhere classified (M25.641)  Precautions/Allergies:   Review of patient's allergies indicates no known allergies. Fall Risk Score: 0 (? 5 = High Risk)  MD Orders: Evaluate and treat: splint to go around pins/S/P ORIF right small finger MEDICAL/REFERRING DIAGNOSIS:   Fracture of unspecified phalanx of unspecified finger, initial encounter for open fracture [S62.609B]   DATE OF ONSET: 2017  DATE OF SURGERY: 5/10/2017   REFERRING PHYSICIAN: Navid Herndon MD  RETURN PHYSICIAN APPOINTMENT: 2017     INITIAL ASSESSMENT:  Ms. Tomeka Campbell presents with decreased functional use, strength and range of motion of her right Little finger PIP joint dorsal aspect. and upper extremity that is affecting her independence with activities of daily living and ability to perform job tasks. I feel that Ms. Tomeka Campbell will benefit from skilled occupational therapy to maximize the functional use of her right\"upper extremity in daily activities and work tasks. PLAN OF CARE:   PROBLEM LIST:  1. Pain in right hand. 2. Decreased motion in right little finger. 3. Decreased strength in right hand. INTERVENTIONS PLANNED:  1. Modalities that may include fluidotherapy, paraffin, ultrasound, and light therapy. 2. Therapeutic exercise including a home exercise program.  3. Manual therapy. 4. Therapeutic activities. TREATMENT PLAN:  Effective Dates: 2017 TO 2017. Frequency/Duration: 1 time a week for 12 weeks  GOALS: (Goals have been discussed and agreed upon with patient.)  Short-Term Functional Goals: Time Frame: 4 weeks  1. Decrease pain to 4 to allow patient to perform self care tasks.   2. Increase motion in right little finger by 15 degrees to improve functional use of upper extremity in ADL activities. 3. Increase strength in right hand by 10 pounds to allow patient to  and lift objects during self care activities. Discharge Goals: Time Frame: 10 weeks  1. Decrease pain to 2 to allow patient to perform all household and work tasks. 2. Increase motion in right little finger by 30 degreees to allow patient to perform all ADL activities. 3. Increase strength in right hand by 20 pounds to allow patient to , lift, hold, and carry heavy objects. Rehabilitation Potential For Stated Goals: Good  Regarding Mike Reyes therapy, I certify that the treatment plan above will be carried out by a therapist or under their direction. Thank you for this referral,  Ramiro Reyes, OT       Referring Physician Signature: Gerri Calloway MD _________________________  Date _________            The information in this section was collected on 5/16/2017 (except where otherwise noted). OCCUPATIONAL PROFILE & HISTORY:   History of Present Injury/Illness (Reason for Referral): The patient was bitten by her dog and fractured her right little finger. Past Medical History/Comorbidities:   Ms. Pleasant Closs  has a past medical history of Nausea & vomiting. Ms. Pleasant Closs  has a past surgical history that includes heent; orthopaedic (1990); and orthopaedic (Right). Social History/Living Environment:      Prior Level of Function/Work/Activity:  Independent  The patient is a [facility].  Dominant Side:         RIGHT  Current Medications:    Current Outpatient Prescriptions:     HYDROcodone-acetaminophen (NORCO) 5-325 mg per tablet, Take 1 Tab by mouth every eight (8) hours as needed for Pain.  Indications: Pain, Disp: , Rfl:     ondansetron (ZOFRAN ODT) 8 mg disintegrating tablet, Take 1 Tab by mouth every eight (8) hours as needed for Nausea., Disp: 10 Tab, Rfl: 0   Date Last Reviewed: 7/7/2017    Number of medical conditions (excluding presenting problem) that affect the Plan of Care: Brief history (0):  LOW COMPLEXITY   ASSESSMENT OF OCCUPATIONAL PERFORMANCE:   RANGE OF MOTION:     · AROM: The patient has limited motion in her right little finger. Measurements not taken due to recent surgery. STRENGTH:  Not tested. SENSATION:  The patient reports numbness in her right little finger tip. Physical Skills Involved:  1. Range of Motion  2. Strength  3. Fine or Gross Motor Coordination  4. Sensation Cognitive Skills Affected (resulting in the inability to perform in a timely and safe manner): 1. none Psychosocial Skills Affected:  1. none   Number of elements that affect the Plan of Care: 3-5:  MODERATE COMPLEXITY   CLINICAL DECISION MAKING:   Outcome Measure: Tool Used: Disabilities of the Arm, Shoulder and Hand (DASH) Questionnaire - Quick Version  Score:  Initial: 43/55  Most Recent: X/55 (Date: -- )   Interpretation of Score: The DASH is designed to measure the activities of daily living in person's with upper extremity dysfunction or pain. Each section is scored on a 1-5 scale, 5 representing the greatest disability. The scores of each section are added together for a total score of 55. Score 11 12-19 20-28 29-37 38-45 46-54 55   Modifier CH CI CJ CK CL CM CN     ? Carrying, Moving, and Handling Objects:     - CURRENT STATUS: CL - 60%-79% impaired, limited or restricted    - GOAL STATUS: CJ - 20%-39% impaired, limited or restricted    - D/C STATUS:  ---------------To be determined---------------    Medical Necessity:   · Patient is expected to demonstrate progress in strength, range of motion and coordination to decrease assistance required with ADL,household and work activities. .  Reason for Services/Other Comments:  · Patient has limited motion,strength and function in her right hand. .  Clinical Decision-Making Assessment:     Clinical Decision-Making: LOW COMPLEXITY   TREATMENT:   (In addition to Assessment/Re-Assessment sessions the following treatments were rendered)  Pre-treatment Symptoms/Complaints:  Pain and stiffness in right little finger. Pain: Initial:   Pain Intensity 1: 0  Pain Location 1: Hand  Pain Orientation 1: Right  Post Session:  0     Patient was provided with a fabricated protection splint for her right little finger. The patient was instructed in a home exercise program.    Patient stated: \"My finger is a little better. Heddie Tolley \"    Manual Therapy: (Soft Tissue Mobilization Duration  Duration: 15 Minutes  Duration: 15 Minutes): Technique: Retrograde massage (followed by Light tx & PROM)  Tissue Mobilized: Scar/adhesion  Finger Mobilized: 5th digit  RUE Soft Tissue Mobilization: Yes   Therapeutic Exercise:                                                                               : The patient's home exercise program was reviewed. Date:  7/7/17 Date:  6/8/17 Date:  6/14/17 Date:  6/22/17 Date:  7/5/17   Activity/Exercise Parameters Parameters Parameters Parameters Parameters   AROM during Fluidotherapy 20 min 20 min 20 min 20 min 20 min   Paraffin with Stretch 15 min  PIP extension   15 min  flexion 15 min  flexion 15 min  flexion 15 min  PIP ext. Retrograde massage, Friction Scar massage, Joint Mobilization   15 min 15 min 15 min 15 min 15 min   Scarf Curl   5 min 5 min 5 min 5 min 5 min   Washer Game        Individual Gripper   20 reps    20 reps   Hand [place]   20 reps    20 reps   putty      5 min    Rubber band  Ext.    10 reps       Clothes Pins   20 reps    20 reps   A-R Bar          Exerstick 3 min   3 min 3 min 3 min 3 min   Blocking boards   3 min 5 min 5 min 3 min 3 min   PROM Ex.  5 min 3 min 3 min    RESISTIVE EXERCISES Weight/ Sets/Reps   Weight/ Sets/Reps Weight/ Sets/Reps Weight/ Sets/Reps Weight/ Sets/Reps   WEIGHT WELL        Sup/Pro        UD/RD        Wrist Flex/Ext        Free Weights          UBE(Minutes)          Nautilus        Compound Row        Vertical [facility]        [employer]                    HEP: As above; handouts given to patient for all exercises. Therapeutic Modalities:      Right Wrist Heat  Type: Paraffin bath (with a PIP little finger extension stretch)  Duration : 15 minutes  Patient Position: Sitting                                        Joint Mobilization:        Treatment Times:  · Therapeutic Exercise: 30 minutes  · Manual Therapy: 15 minutes  · Parafin: 15 minutes  · Whirlpool:  minutes  · Other:  minutes     Treatment/Session Assessment:    · Response to Treatment:  Patients tolerated treatment well with no complications. Upon completion of treatment, skin condition was normal..  · Compliance with Program/Exercises: Will assess as treatment progresses. · Recommendations/Intent for next treatment session: \"Next visit will focus on advancements to more challenging activities\". .Will continue per MD.  Total Treatment Duration:  OT Patient Time In/Time Out  Time In: 0200  Time Out: [facility], OT

## 2017-07-11 ENCOUNTER — HOSPITAL ENCOUNTER (OUTPATIENT)
Dept: PHYSICAL THERAPY | Age: 52
Discharge: HOME | End: 2017-07-11
Payer: COMMERCIAL

## 2017-07-12 ENCOUNTER — HOSPITAL ENCOUNTER (OUTPATIENT)
Dept: MAMMOGRAPHY | Age: 52
Discharge: HOME | End: 2017-07-12
Attending: INTERNAL MEDICINE
Payer: COMMERCIAL

## 2017-07-12 DIAGNOSIS — N63.10 MASS OF RIGHT BREAST: ICD-10-CM

## 2017-07-12 PROCEDURE — 76642 ULTRASOUND BREAST LIMITED: CPT

## 2017-07-12 PROCEDURE — 77066 DX MAMMO INCL CAD BI: CPT

## 2017-07-18 ENCOUNTER — HOSPITAL ENCOUNTER (OUTPATIENT)
Dept: PHYSICAL THERAPY | Age: 52
Discharge: HOME | End: 2017-07-18
Payer: COMMERCIAL

## 2017-07-18 PROCEDURE — 97018 PARAFFIN BATH THERAPY: CPT

## 2017-07-18 PROCEDURE — 97140 MANUAL THERAPY 1/> REGIONS: CPT

## 2017-07-18 PROCEDURE — 97110 THERAPEUTIC EXERCISES: CPT

## 2017-07-18 NOTE — PROGRESS NOTES
Kendrickacacia Loo  : 1965 [facility] [place] [address],[address], [address] [facility]  [phone]   [phone]         OUTPATIENT OCCUPATIONAL THERAPY: Daily Note 2017   ICD-10: Treatment Diagnosis: Pain in right hand (M79.641)Stiffness of right hand, not elsewhere classified (M25.641)  Precautions/Allergies:   Review of patient's allergies indicates no known allergies. Fall Risk Score: 0 (? 5 = High Risk)  MD Orders: Evaluate and treat: splint to go around pins/S/P ORIF right small finger MEDICAL/REFERRING DIAGNOSIS:   Fracture of unspecified phalanx of unspecified finger, initial encounter for open fracture [S62.609B]   DATE OF ONSET: 2017  DATE OF SURGERY: 5/10/2017   REFERRING PHYSICIAN: Hugh To MD  RETURN PHYSICIAN APPOINTMENT: 2017     INITIAL ASSESSMENT:  Ms. Jose Angel Luz presents with decreased functional use, strength and range of motion of her right Little finger PIP joint dorsal aspect. and upper extremity that is affecting her independence with activities of daily living and ability to perform job tasks. I feel that Ms. Jose Angel Luz will benefit from skilled occupational therapy to maximize the functional use of her right\"upper extremity in daily activities and work tasks. PLAN OF CARE:   PROBLEM LIST:  1. Pain in right hand. 2. Decreased motion in right little finger. 3. Decreased strength in right hand. INTERVENTIONS PLANNED:  1. Modalities that may include fluidotherapy, paraffin, ultrasound, and light therapy. 2. Therapeutic exercise including a home exercise program.  3. Manual therapy. 4. Therapeutic activities. TREATMENT PLAN:  Effective Dates: 2017 TO 2017. Frequency/Duration: 1 time a week for 12 weeks  GOALS: (Goals have been discussed and agreed upon with patient.)  Short-Term Functional Goals: Time Frame: 4 weeks  1. Decrease pain to 4 to allow patient to perform self care tasks.   2. Increase motion in right little finger by 15 degrees to improve functional use of upper extremity in ADL activities. 3. Increase strength in right hand by 10 pounds to allow patient to  and lift objects during self care activities. Discharge Goals: Time Frame: 10 weeks  1. Decrease pain to 2 to allow patient to perform all household and work tasks. 2. Increase motion in right little finger by 30 degreees to allow patient to perform all ADL activities. 3. Increase strength in right hand by 20 pounds to allow patient to , lift, hold, and carry heavy objects. Rehabilitation Potential For Stated Goals: Good  Regarding Evins Rather therapy, I certify that the treatment plan above will be carried out by a therapist or under their direction. Thank you for this referral,  Vandana Patel, OT       Referring Physician Signature: Gianna Cardona MD _________________________  Date _________            The information in this section was collected on 5/16/2017 (except where otherwise noted). OCCUPATIONAL PROFILE & HISTORY:   History of Present Injury/Illness (Reason for Referral): The patient was bitten by her dog and fractured her right little finger. Past Medical History/Comorbidities:   Ms. Leia Hensley  has a past medical history of Nausea & vomiting. Ms. Leia Hensley  has a past surgical history that includes heent; orthopaedic (1990); and orthopaedic (Right). 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  Dominant Side:         RIGHT  Current Medications:  No current outpatient prescriptions on file. Date Last Reviewed: 7/18/2017    Number of medical conditions (excluding presenting problem) that affect the Plan of Care: Brief history (0):  LOW COMPLEXITY   ASSESSMENT OF OCCUPATIONAL PERFORMANCE:   RANGE OF MOTION:     · AROM: The patient has limited motion in her right little finger. Measurements not taken due to recent surgery. STRENGTH:  Not tested. SENSATION:  The patient reports numbness in her right little finger tip. Physical Skills Involved:  1. Range of Motion  2. Strength  3. Fine or Gross Motor Coordination  4. Sensation Cognitive Skills Affected (resulting in the inability to perform in a timely and safe manner): 1. none Psychosocial Skills Affected:  1. none   Number of elements that affect the Plan of Care: 3-5:  MODERATE COMPLEXITY   CLINICAL DECISION MAKING:   Outcome Measure: Tool Used: Disabilities of the Arm, Shoulder and Hand (DASH) Questionnaire - Quick Version  Score:  Initial: 43/55  Most Recent: X/55 (Date: -- )   Interpretation of Score: The DASH is designed to measure the activities of daily living in person's with upper extremity dysfunction or pain. Each section is scored on a 1-5 scale, 5 representing the greatest disability. The scores of each section are added together for a total score of 55. Score 11 12-19 20-28 29-37 38-45 46-54 55   Modifier CH CI CJ CK CL CM CN     ? Carrying, Moving, and Handling Objects:     - CURRENT STATUS: CL - 60%-79% impaired, limited or restricted    - GOAL STATUS: CJ - 20%-39% impaired, limited or restricted    - D/C STATUS:  ---------------To be determined---------------    Medical Necessity:   · Patient is expected to demonstrate progress in strength, range of motion and coordination to decrease assistance required with ADL,household and work activities. .  Reason for Services/Other Comments:  · Patient has limited motion,strength and function in her right hand. .  Clinical Decision-Making Assessment:     Clinical Decision-Making: LOW COMPLEXITY   TREATMENT:   (In addition to Assessment/Re-Assessment sessions the following treatments were rendered)  Pre-treatment Symptoms/Complaints:  Pain and stiffness in right little finger.   Pain: Initial:   Pain Intensity 1: 0  Pain Location 1: Hand (little finger)  Pain Orientation 1: Right Post Session:  0     Patient was provided with a fabricated protection splint for her right little finger. The patient was instructed in a home exercise program.    Patient stated: \"I am able to use my hand more. Markus Olmedo \"    Manual Therapy: ( ): Technique: Retrograde massage (followed by PROM)  Tissue Mobilized: Scar/adhesion  Finger Mobilized: 5th digit  RUE Soft Tissue Mobilization: Yes   Therapeutic Exercise:                                                                               : The patient's home exercise program was reviewed. Date:  7/7/17 Date:  7/18/17 Date:  6/14/17 Date:  6/22/17 Date:  7/5/17   Activity/Exercise Parameters Parameters Parameters Parameters Parameters   AROM during Fluidotherapy 20 min 20 min 20 min 20 min 20 min   Paraffin with Stretch 15 min  PIP extension   15 min  extension 15 min  flexion 15 min  flexion 15 min  PIP ext. Retrograde massage, Friction Scar massage, Joint Mobilization   15 min 15 min 15 min 15 min 15 min   Scarf Curl   5 min 5 min 5 min 5 min 5 min   Washer Game        Individual Gripper   20 reps 25 reps   20 reps   Hand [place]   20 reps 25 reps   20 reps   putty      5 min    Rubber band  Ext. 10 reps 10 reps      Clothes Pins   20 reps 30 reps   20 reps   A-R Bar          Exerstick 3 min   3 min 3 min 3 min 3 min   Blocking boards   3 min 3 min 5 min 3 min 3 min   PROM Ex.  2 min 3 min 3 min    RESISTIVE EXERCISES Weight/ Sets/Reps   Weight/ Sets/Reps Weight/ Sets/Reps Weight/ Sets/Reps Weight/ Sets/Reps   WEIGHT WELL        Sup/Pro        UD/RD        Wrist Flex/Ext        Free Weights          UBE(Minutes)          Nautilus        Compound Row        Vertical Chest        Overhead Press                    HEP: As above; handouts given to patient for all exercises.     Therapeutic Modalities:      Right Wrist Heat  Type: Paraffin bath (with a little finger extension stretch)  Duration : 15 minutes  Patient Position: Sitting                                        Joint Mobilization:        Treatment Times:  · Therapeutic Exercise: 30 minutes  · Manual Therapy: 15 minutes  · Parafin: 15 minutes  · Whirlpool:  minutes  · Other:  minutes     Treatment/Session Assessment:    · Response to Treatment:  Patients tolerated treatment well with no complications. Upon completion of treatment, skin condition was normal..  · Compliance with Program/Exercises: Will assess as treatment progresses. · Recommendations/Intent for next treatment session: \"Next visit will focus on advancements to more challenging activities\". .Will continue per MD.  Total Treatment Duration:  OT Patient Time In/Time Out  Time In: 0200  Time Out: [address] OT

## 2017-07-24 ENCOUNTER — HOSPITAL ENCOUNTER (OUTPATIENT)
Dept: PHYSICAL THERAPY | Age: 52
Discharge: HOME | End: 2017-07-24
Payer: COMMERCIAL

## 2017-07-25 ENCOUNTER — HOSPITAL ENCOUNTER (OUTPATIENT)
Dept: PHYSICAL THERAPY | Age: 52
Discharge: HOME | End: 2017-07-25
Payer: COMMERCIAL

## 2017-07-25 PROCEDURE — 97110 THERAPEUTIC EXERCISES: CPT

## 2017-07-25 PROCEDURE — 97018 PARAFFIN BATH THERAPY: CPT

## 2017-07-25 PROCEDURE — 97140 MANUAL THERAPY 1/> REGIONS: CPT

## 2017-07-25 NOTE — PROGRESS NOTES
Osito Jason  : 1965 [facility] [place] [address] [facility], [address] [place] [address],[address], [place] [address].  [phone]   [phone]         OUTPATIENT OCCUPATIONAL THERAPY: Daily Note 2017   ICD-10: Treatment Diagnosis: Pain in right hand (M79.641)Stiffness of right hand, not elsewhere classified (M25.641)  Precautions/Allergies:   Review of patient's allergies indicates no known allergies. Fall Risk Score: 0 (? 5 = High Risk)  MD Orders: Evaluate and treat: splint to go around pins/S/P ORIF right small finger MEDICAL/REFERRING DIAGNOSIS:   Fracture of unspecified phalanx of unspecified finger, initial encounter for open fracture [S62.609B]   DATE OF ONSET: 2017  DATE OF SURGERY: 5/10/2017   REFERRING PHYSICIAN: Jones Zhou MD  RETURN PHYSICIAN APPOINTMENT: 2017     INITIAL ASSESSMENT:  Ms. Apollo Owens presents with decreased functional use, strength and range of motion of her right Little finger PIP joint dorsal aspect. and upper extremity that is affecting her independence with activities of daily living and ability to perform job tasks. I feel that Ms. Apollo Owens will benefit from skilled occupational therapy to maximize the functional use of her right\"upper extremity in daily activities and work tasks. PLAN OF CARE:   PROBLEM LIST:  1. Pain in right hand. 2. Decreased motion in right little finger. 3. Decreased strength in right hand. INTERVENTIONS PLANNED:  1. Modalities that may include fluidotherapy, paraffin, ultrasound, and light therapy. 2. Therapeutic exercise including a home exercise program.  3. Manual therapy. 4. Therapeutic activities. TREATMENT PLAN:  Effective Dates: 2017 TO 2017. Frequency/Duration: 1 time a week for 12 weeks  GOALS: (Goals have been discussed and agreed upon with patient.)  Short-Term Functional Goals: Time Frame: 4 weeks  1. Decrease pain to 4 to allow patient to perform self care tasks.   2. Increase motion in right little finger by 15 degrees to improve functional use of upper extremity in ADL activities. 3. Increase strength in right hand by 10 pounds to allow patient to  and lift objects during self care activities. Discharge Goals: Time Frame: 10 weeks  1. Decrease pain to 2 to allow patient to perform all household and work tasks. 2. Increase motion in right little finger by 30 degreees to allow patient to perform all ADL activities. 3. Increase strength in right hand by 20 pounds to allow patient to , lift, hold, and carry heavy objects. Rehabilitation Potential For Stated Goals: Good  Regarding Shaun Velazquez therapy, I certify that the treatment plan above will be carried out by a therapist or under their direction. Thank you for this referral,  Haley Arias OT       Referring Physician Signature: Kalani Baltazar MD _________________________  Date _________            The information in this section was collected on 5/16/2017 (except where otherwise noted). OCCUPATIONAL PROFILE & HISTORY:   History of Present Injury/Illness (Reason for Referral): The patient was bitten by her dog and fractured her right little finger. Past Medical History/Comorbidities:   Ms. Meeta Cárdenas  has a past medical history of Nausea & vomiting. Ms. Meeta Cárednas  has a past surgical history that includes heent; orthopaedic (1990); and orthopaedic (Right). 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  Dominant Side:         RIGHT  Current Medications:  No current outpatient prescriptions on file. Date Last Reviewed: 7/25/2017    Number of medical conditions (excluding presenting problem) that affect the Plan of Care: Brief history (0):  LOW COMPLEXITY   ASSESSMENT OF OCCUPATIONAL PERFORMANCE:   RANGE OF MOTION:     · AROM: The patient has limited motion in her right little finger. Measurements not taken due to recent surgery. STRENGTH:  Not tested. SENSATION:  The patient reports numbness in her right little finger tip. Physical Skills Involved:  1. Range of Motion  2. Strength  3. Fine or Gross Motor Coordination  4. Sensation Cognitive Skills Affected (resulting in the inability to perform in a timely and safe manner): 1. none Psychosocial Skills Affected:  1. none   Number of elements that affect the Plan of Care: 3-5:  MODERATE COMPLEXITY   CLINICAL DECISION MAKING:   Outcome Measure: Tool Used: Disabilities of the Arm, Shoulder and Hand (DASH) Questionnaire - Quick Version  Score:  Initial: 43/55  Most Recent: X/55 (Date: -- )   Interpretation of Score: The DASH is designed to measure the activities of daily living in person's with upper extremity dysfunction or pain. Each section is scored on a 1-5 scale, 5 representing the greatest disability. The scores of each section are added together for a total score of 55. Score 11 12-19 20-28 29-37 38-45 46-54 55   Modifier CH CI CJ CK CL CM CN     ? Carrying, Moving, and Handling Objects:     - CURRENT STATUS: CL - 60%-79% impaired, limited or restricted    - GOAL STATUS: CJ - 20%-39% impaired, limited or restricted    - D/C STATUS:  ---------------To be determined---------------    Medical Necessity:   · Patient is expected to demonstrate progress in strength, range of motion and coordination to decrease assistance required with ADL,household and work activities. .  Reason for Services/Other Comments:  · Patient has limited motion,strength and function in her right hand. .  Clinical Decision-Making Assessment:     Clinical Decision-Making: LOW COMPLEXITY   TREATMENT:   (In addition to Assessment/Re-Assessment sessions the following treatments were rendered)  Pre-treatment Symptoms/Complaints:  Pain and stiffness in right little finger.   Pain: Initial:   Pain Intensity 1: 0  Pain Location 1: Hand (little finger)  Pain Orientation 1: Right Post Session:  0     Patient was provided with a fabricated protection splint for her right little finger. The patient was instructed in a home exercise program.    Patient stated: \"I see Dr. Karin Vidales tomorrow. Madeleine Espinoza \"    Manual Therapy: (Soft Tissue Mobilization Duration  Duration: 15 Minutes  Duration: 15 Minutes): Technique: Retrograde massage (followed by PROM)  Tissue Mobilized: Scar/adhesion  Finger Mobilized: 5th digit  RUE Soft Tissue Mobilization: Yes   Therapeutic Exercise:                                                                               : The patient's home exercise program was reviewed. Date:  7/7/17 Date:  7/18/17 Date:  7/25/17 Date:  6/22/17 Date:  7/5/17   Activity/Exercise Parameters Parameters Parameters Parameters Parameters   AROM during Fluidotherapy 20 min 20 min 20 min 20 min 20 min   Paraffin with Stretch 15 min  PIP extension   15 min  extension 15 min  PIP extension 15 min  flexion 15 min  PIP ext. Retrograde massage, Friction Scar massage, Joint Mobilization   15 min 15 min 15 min 15 min 15 min   Scarf Curl   5 min 5 min 5 min 5 min 5 min   Washer Game        Individual Gripper   20 reps 25 reps 25 reps  20 reps   Hand [place]   20 reps 25 reps 25 reps  20 reps   putty      5 min    Rubber band  Ext. 10 reps 10 reps 10 reps     Clothes Pins   20 reps 30 reps 30 reps  20 reps   A-R Bar          Exerstick 3 min   3 min 3 min 3 min 3 min   Blocking boards   3 min 3 min 1 min 3 min 3 min   PROM Ex.  2 min 1 min 3 min    RESISTIVE EXERCISES Weight/ Sets/Reps   Weight/ Sets/Reps Weight/ Sets/Reps Weight/ Sets/Reps Weight/ Sets/Reps   WEIGHT WELL        Sup/Pro        UD/RD        Wrist Flex/Ext        Free Weights          UBE(Minutes)          Nautilus        Compound Row        Vertical Chest        Overhead Press                    HEP: As above; handouts given to patient for all exercises.     Therapeutic Modalities:      Right Wrist Heat  Type: Paraffin bath (with a little finger PIP extension stretch)  Duration : 15 minutes  Patient Position: Sitting                                        Joint Mobilization:        Treatment Times:  · Therapeutic Exercise: 30 minutes  · Manual Therapy: 15 minutes  · Parafin: 15 minutes  · Whirlpool:  minutes  · Other:  minutes     Treatment/Session Assessment:    · Response to Treatment:  Patients tolerated treatment well with no complications. Upon completion of treatment, skin condition was normal..  · Compliance with Program/Exercises: Will assess as treatment progresses. · Recommendations/Intent for next treatment session: \"Next visit will focus on advancements to more challenging activities\"MD progress note completed. Vargas Bales Will continue per MD.  Total Treatment Duration:  OT Patient Time In/Time Out  Time In: 0800  Time Out: 0900    Ramiro Reyes OT

## 2017-11-02 NOTE — PROGRESS NOTES
Arielle Duggan  : 1965 [facility] [place] [address], [place] [address].  [phone]   [phone]         OUTPATIENT OCCUPATIONAL THERAPY: Discontinuation Summary    ICD-10: Treatment Diagnosis: Pain in right hand (M79.641)Stiffness of right hand, not elsewhere classified (M25.641)  Precautions/Allergies:   Review of patient's allergies indicates no known allergies. Fall Risk Score: 0 (? 5 = High Risk)  MD Orders: Evaluate and treat: splint to go around pins/S/P ORIF right small finger MEDICAL/REFERRING DIAGNOSIS:   Fracture of unspecified phalanx of unspecified finger, initial encounter for open fracture [S62.609B]   DATE OF ONSET: 2017  DATE OF SURGERY: 5/10/2017   REFERRING PHYSICIAN: Jaylene Hoyt MD       INITIAL ASSESSMENT:  Ms. Sarahi Wilson presents with decreased functional use, strength and range of motion of her right Little finger PIP joint dorsal aspect. and upper extremity that is affecting her independence with activities of daily living and ability to perform job tasks. I feel that Ms. Sarahi Wilson will benefit from skilled occupational therapy to maximize the functional use of her right\"upper extremity in daily activities and work tasks. PLAN OF CARE:   PROBLEM LIST:  1. Pain in right hand. 2. Decreased motion in right little finger. 3. Decreased strength in right hand. INTERVENTIONS PLANNED:  1. Modalities that may include fluidotherapy, paraffin, ultrasound, and light therapy. 2. Therapeutic exercise including a home exercise program.  3. Manual therapy. 4. Therapeutic activities. TREATMENT PLAN:  Effective Dates: 2017 TO 2017. Frequency/Duration: 1 time a week for 12 weeks  GOALS: (Goals have been discussed and agreed upon with patient.)  Short-Term Functional Goals: Time Frame: 4 weeks  1. Decrease pain to 4 to allow patient to perform self care tasks. ( GOAL WERE NOT REASSESSED )  2.  Increase motion in right little finger by 15 degrees to improve functional use of upper extremity in ADL activities. 3. Increase strength in right hand by 10 pounds to allow patient to  and lift objects during self care activities. Discharge Goals: Time Frame: 10 weeks  1. Decrease pain to 2 to allow patient to perform all household and work tasks. 2. Increase motion in right little finger by 30 degreees to allow patient to perform all ADL activities. 3. Increase strength in right hand by 20 pounds to allow patient to , lift, hold, and carry heavy objects. Rehabilitation Potential For Stated Goals: Good  Regarding Yadira Tyson therapy, I certify that the treatment plan above will be carried out by a therapist or under their direction. Thank you for this referral,  Patrice Singh, OT       Referring Physician Signature: Regina Orozco MD _________________________  Date _________            The information in this section was collected on 5/16/2017 (except where otherwise noted). OCCUPATIONAL PROFILE & HISTORY:   History of Present Injury/Illness (Reason for Referral): The patient was bitten by her dog and fractured her right little finger. Past Medical History/Comorbidities:   Ms. Danielle Garza  has a past medical history of Nausea & vomiting. Ms. Danielle Garza  has a past surgical history that includes heent; orthopaedic (1990); and orthopaedic (Right). 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 Date Last Reviewed: 7/25/2017    Number of medical conditions (excluding presenting problem) that affect the Plan of Care: Brief history (0):  LOW COMPLEXITY   ASSESSMENT OF OCCUPATIONAL PERFORMANCE:   RANGE OF MOTION:     · AROM: The patient has limited motion in her right little finger. Measurements not taken due to recent surgery. STRENGTH:  Not tested. SENSATION:  The patient reports numbness in her right little finger tip. Physical Skills Involved:  1. Range of Motion  2. Strength  3. Fine or Gross Motor Coordination  4. Sensation Cognitive Skills Affected (resulting in the inability to perform in a timely and safe manner): 1. none Psychosocial Skills Affected:  1. none   Number of elements that affect the Plan of Care: 3-5:  MODERATE COMPLEXITY   CLINICAL DECISION MAKING:   Outcome Measure: Tool Used: Disabilities of the Arm, Shoulder and Hand (DASH) Questionnaire - Quick Version  Score:  Initial: 43/55  Most Recent: X/55 (Date: -- )   Interpretation of Score: The DASH is designed to measure the activities of daily living in person's with upper extremity dysfunction or pain. Each section is scored on a 1-5 scale, 5 representing the greatest disability. The scores of each section are added together for a total score of 55. Score 11 12-19 20-28 29-37 38-45 46-54 55   Modifier CH CI CJ CK CL CM CN     ? Carrying, Moving, and Handling Objects:     - CURRENT STATUS: CL - 60%-79% impaired, limited or restricted    - GOAL STATUS: CJ - 20%-39% impaired, limited or restricted    - D/C STATUS:  ---------------To be determined---------------    Medical Necessity:   · Patient is expected to demonstrate progress in strength, range of motion and coordination to decrease assistance required with ADL,household and work activities. .  Reason for Services/Other Comments:  · Patient has limited motion,strength and function in her right hand. .  Clinical Decision-Making Assessment:     Clinical Decision-Making: LOW COMPLEXITY   TREATMENT:   ·      Treatment/Session Assessment:    · Response to Treatment:  Patients tolerated treatment well with no complications. Upon completion of treatment, skin condition was normal..  · Compliance with Program/Exercises:  Will assess as treatment progresses. · Recommendations/Intent for next treatment session: \"To discharge, patient did not return to therapy. \"        Cherri Marshall, OT

## 2019-03-23 ENCOUNTER — HOSPITAL ENCOUNTER (EMERGENCY)
Age: 54
Discharge: HOME | End: 2019-03-23
Attending: EMERGENCY MEDICINE | Admitting: EMERGENCY MEDICINE
Payer: COMMERCIAL

## 2019-03-23 ENCOUNTER — APPOINTMENT (OUTPATIENT)
Dept: GENERAL RADIOLOGY | Age: 54
End: 2019-03-23
Attending: EMERGENCY MEDICINE
Payer: COMMERCIAL

## 2019-03-23 VITALS
BODY MASS INDEX: 26.24 KG/M2 | HEART RATE: 75 BPM | DIASTOLIC BLOOD PRESSURE: 89 MMHG | HEIGHT: 61 IN | WEIGHT: 139 LBS | RESPIRATION RATE: 16 BRPM | OXYGEN SATURATION: 100 % | SYSTOLIC BLOOD PRESSURE: 148 MMHG | TEMPERATURE: 98.2 F

## 2019-03-23 DIAGNOSIS — M79.18 MUSCULOSKELETAL PAIN: Primary | ICD-10-CM

## 2019-03-23 PROCEDURE — 99283 EMERGENCY DEPT VISIT LOW MDM: CPT | Performed by: EMERGENCY MEDICINE

## 2019-03-23 PROCEDURE — 74011250637 HC RX REV CODE- 250/637: Performed by: EMERGENCY MEDICINE

## 2019-03-23 PROCEDURE — 71046 X-RAY EXAM CHEST 2 VIEWS: CPT

## 2019-03-23 PROCEDURE — 73130 X-RAY EXAM OF HAND: CPT

## 2019-03-23 RX ORDER — ONDANSETRON 4 MG/1
4 TABLET, ORALLY DISINTEGRATING ORAL
Status: COMPLETED | OUTPATIENT
Start: 2019-03-23 | End: 2019-03-23

## 2019-03-23 RX ORDER — CYCLOBENZAPRINE HCL 10 MG
10 TABLET ORAL
Qty: 15 TAB | Refills: 0 | Status: SHIPPED | OUTPATIENT
Start: 2019-03-23 | End: 2019-09-05

## 2019-03-23 RX ORDER — TRAMADOL HYDROCHLORIDE 50 MG/1
50 TABLET ORAL
Status: COMPLETED | OUTPATIENT
Start: 2019-03-23 | End: 2019-03-23

## 2019-03-23 RX ORDER — DICLOFENAC SODIUM 50 MG/1
50 TABLET, DELAYED RELEASE ORAL 2 TIMES DAILY
Qty: 14 TAB | Refills: 0 | Status: SHIPPED | OUTPATIENT
Start: 2019-03-23 | End: 2019-09-05

## 2019-03-23 RX ADMIN — TRAMADOL HYDROCHLORIDE 50 MG: 50 TABLET, FILM COATED ORAL at 09:05

## 2019-03-23 RX ADMIN — ONDANSETRON 4 MG: 4 TABLET, ORALLY DISINTEGRATING ORAL at 08:57
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
## 2019-03-23 NOTE — LETTER
[facility] EMERGENCY DEPT 
[address] [place] [id number] 
[phone] Work/School Note Date: 3/23/2019 To Whom It May concern: 
 
Bola Shen was seen and treated today in the emergency room by the following provider(s): 
Attending Provider: Mukul Mcallister MD.   
 
Bola Shen may return to work on 3/26/19 or sooner if feeling better. Sincerely, Arjun Wing RN

## 2019-10-07 ENCOUNTER — APPOINTMENT (OUTPATIENT)
Dept: GENERAL RADIOLOGY | Age: 54
End: 2019-10-07
Attending: EMERGENCY MEDICINE
Payer: COMMERCIAL

## 2019-10-07 ENCOUNTER — HOSPITAL ENCOUNTER (EMERGENCY)
Age: 54
Discharge: HOME | End: 2019-10-07
Payer: COMMERCIAL

## 2019-10-07 VITALS
RESPIRATION RATE: 16 BRPM | TEMPERATURE: 98 F | SYSTOLIC BLOOD PRESSURE: 152 MMHG | WEIGHT: 139 LBS | HEIGHT: 61 IN | DIASTOLIC BLOOD PRESSURE: 79 MMHG | BODY MASS INDEX: 26.24 KG/M2 | OXYGEN SATURATION: 98 %

## 2019-10-07 DIAGNOSIS — S92.504A CLOSED NONDISPLACED FRACTURE OF PHALANX OF LESSER TOE OF RIGHT FOOT, UNSPECIFIED PHALANX, INITIAL ENCOUNTER: Primary | ICD-10-CM

## 2019-10-07 PROCEDURE — 73630 X-RAY EXAM OF FOOT: CPT

## 2019-10-07 PROCEDURE — 99283 EMERGENCY DEPT VISIT LOW MDM: CPT
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
## 2019-10-07 NOTE — ED NOTES
Applied luis tape to patient's 2nd and 3rd digit on right foot. I have reviewed discharge instructions with the patient. The patient verbalized understanding. Patient left ED via Discharge Method: ambulatory to Home. Opportunity for questions and clarification provided. Patient given 0 scripts. To continue your aftercare when you leave the hospital, you may receive an automated call from our care team to check in on how you are doing. This is a free service and part of our promise to provide the best care and service to meet your aftercare needs.  If you have questions, or wish to unsubscribe from this service please call [phone]. Thank you for Choosing our [facility] Emergency Department.

## 2019-10-07 NOTE — DISCHARGE INSTRUCTIONS
Patient Education      Ibuprofen for pain and elevate foot for pain relief  Broken Toe: Care Instructions  Your Care Instructions  You have broken (fractured) a bone in your toe. This kind of fracture does not need a special cast or brace. \"Luis-taping\" your broken toe to a healthy toe next to it is almost always enough to treat the problem and ease symptoms. The toe may take 4 weeks or more to heal.  You heal best when you take good care of yourself. Eat a variety of healthy foods, and don't smoke. Follow-up care is a key part of your treatment and safety. Be sure to make and go to all appointments, and call your doctor if you are having problems. It's also a good idea to know your test results and keep a list of the medicines you take. How can you care for yourself at home? · Be safe with medicines. Take pain medicines exactly as directed. ? If the doctor gave you a prescription medicine for pain, take it as prescribed. ? If you are not taking a prescription pain medicine, ask your doctor if you can take an over-the-counter medicine. · If your toe is taped to the toe next to it, your doctor has shown you how to change the tape. Protect the skin by putting something soft, such as felt or foam, between your toes before you tape them together. Never tape the toes together skin-to-skin. Your broken toe may need to be luis-taped for 2 to 4 weeks to heal.  · Rest and protect your toe. Do not walk on it until you can do so without too much pain. If the doctor has told you to use crutches, use them as instructed. · Put ice or a cold pack on your toe for 10 to 20 minutes at a time. Try to do this every 1 to 2 hours for the next 3 days (when you are awake) or until the swelling goes down. Put a thin cloth between the ice and your skin. · Prop up your foot on a pillow when you ice it or anytime you sit or lie down. Try to keep it above the level of your heart. This will help reduce swelling.   · Make sure you go to your follow-up appointments. Your doctor will need to check that your toe is healing right. When should you call for help? Call your doctor now or seek immediate medical care if:    · You have severe pain.     · Your toe is cool or pale or changes color.     · You have tingling, weakness, or numbness in your toe.    Watch closely for changes in your health, and be sure to contact your doctor if:    · Pain and swelling get worse.     · You are not getting better as expected. Where can you learn more? Go to http://fany-coreen.info/. Enter B472 in the search box to learn more about \"Broken Toe: Care Instructions. \"  Current as of: June 26, 2019  Content Version: 12.2  © 3472-2436 [employer]. Care instructions adapted under license by [employer] (which disclaims liability or warranty for this information). If you have questions about a medical condition or this instruction, always ask your healthcare professional. [place] [address] any warranty or liability for your use of this information.

## 2019-10-07 NOTE — ED TRIAGE NOTES
Pt states she tripped yesterday while wearing flip flops, states her right foot is swollen and painful.

## 2019-11-18 NOTE — PROGRESS NOTES
From: Brady Mcgovern  To: Donna Reis MD  Sent: 11/18/2019 7:20 AM CST  Subject: Medication Question    Dear Dr. Polanco Foil:    Lam Antis a letter from insurance company that Ranitidine contains a cancer causing compound, i threw away all Ranitidine, should i get a different medicine, let me know    Sharee Holguin Patient called and canceled her appointment she was not feeling well.

## 2021-04-05 ENCOUNTER — HOSPITAL ENCOUNTER (EMERGENCY)
Age: 56
Discharge: HOME | End: 2021-04-05
Attending: EMERGENCY MEDICINE
Payer: COMMERCIAL

## 2021-04-05 ENCOUNTER — APPOINTMENT (OUTPATIENT)
Dept: GENERAL RADIOLOGY | Age: 56
End: 2021-04-05
Attending: PHYSICIAN ASSISTANT
Payer: COMMERCIAL

## 2021-04-05 VITALS
HEART RATE: 103 BPM | HEIGHT: 62 IN | WEIGHT: 139 LBS | RESPIRATION RATE: 18 BRPM | DIASTOLIC BLOOD PRESSURE: 96 MMHG | TEMPERATURE: 98.4 F | BODY MASS INDEX: 25.58 KG/M2 | SYSTOLIC BLOOD PRESSURE: 143 MMHG | OXYGEN SATURATION: 98 %

## 2021-04-05 DIAGNOSIS — M79.642 LEFT HAND PAIN: ICD-10-CM

## 2021-04-05 DIAGNOSIS — M79.641 RIGHT HAND PAIN: ICD-10-CM
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
## 2021-04-05 PROCEDURE — 74011250637 HC RX REV CODE- 250/637: Performed by: PHYSICIAN ASSISTANT

## 2021-04-05 PROCEDURE — 73130 X-RAY EXAM OF HAND: CPT

## 2021-04-05 PROCEDURE — 99283 EMERGENCY DEPT VISIT LOW MDM: CPT

## 2021-04-05 RX ORDER — AMOXICILLIN AND CLAVULANATE POTASSIUM 875; 125 MG/1; MG/1
1 TABLET, FILM COATED ORAL 2 TIMES DAILY
Qty: 14 TAB | Refills: 0 | Status: SHIPPED | OUTPATIENT
Start: 2021-04-05 | End: 2021-04-12

## 2021-04-05 RX ORDER — ACETAMINOPHEN 500 MG
1000 TABLET ORAL
Status: COMPLETED | OUTPATIENT
Start: 2021-04-05 | End: 2021-04-05

## 2021-04-05 RX ORDER — AMOXICILLIN AND CLAVULANATE POTASSIUM 875; 125 MG/1; MG/1
1 TABLET, FILM COATED ORAL
Status: COMPLETED | OUTPATIENT
Start: 2021-04-05 | End: 2021-04-05

## 2021-04-05 RX ADMIN — AMOXICILLIN AND CLAVULANATE POTASSIUM 1 TABLET: 875; 125 TABLET, FILM COATED ORAL at 17:49

## 2021-04-05 RX ADMIN — ACETAMINOPHEN 1000 MG: 500 TABLET, FILM COATED ORAL at 17:51

## 2021-04-05 NOTE — ED TRIAGE NOTES
Reports she got between her dogs who were fighting and sustained injuries to both hands, the right with significant swelling.

## 2021-04-05 NOTE — ED NOTES
I have reviewed discharge instructions with the patient. The patient verbalized understanding. Patient left ED via Discharge Method: ambulatory to Home with son. Opportunity for questions and clarification provided. Patient given 1 scripts. To continue your aftercare when you leave the hospital, you may receive an automated call from our care team to check in on how you are doing. This is a free service and part of our promise to provide the best care and service to meet your aftercare needs.  If you have questions, or wish to unsubscribe from this service please call [phone]. Thank you for Choosing our [address] [place] [address] Emergency Department.
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
## 2021-04-05 NOTE — Clinical Note
[id number] [address] EMERGENCY DEPT  [facility] [address] [phone]    Work/School Note    Date: 4/5/2021    To Whom It May concern:      Roxana Brown was seen and treated today in the emergency room by the following provider(s):  Attending Provider: Nishant Sarah MD  Physician Assistant: Sarah Bailon, [address]. Roxana Brown is excused from work/school on 04/05/21. She is clear to return to work/school on 04/06/21.         Sincerely,          RADHA Quijano

## 2021-04-07 NOTE — PROGRESS NOTES
Consult to  that the patient has requested a referral to The [address] [place] [address]. Referral printed off and faxed. Patient notified.      Mihir De La O LMSW    [facility]    * [email or web address]

## 2021-04-07 NOTE — ED NOTES
Chart accessed as pt called wanting referral to hand center. I can not do this as she has already been dc.   I have left message for our  to contact her to provide referral.

## 2021-04-08 NOTE — PROGRESS NOTES
Patient called, states she spoke with The [address] [place] [address] who advised that they did not receive the referral this SW sent yesterday. HILARIA confirmed the fax number and re-faxed the referral three separate times.      Reuben Muñoz LMSW    [facility]    * [email or web address]

## 2021-10-08 ENCOUNTER — HOSPITAL ENCOUNTER (EMERGENCY)
Age: 56
Discharge: HOME | End: 2021-10-08
Attending: EMERGENCY MEDICINE
Payer: COMMERCIAL

## 2021-10-08 VITALS
HEART RATE: 85 BPM | TEMPERATURE: 98.6 F | HEIGHT: 62 IN | BODY MASS INDEX: 25.58 KG/M2 | RESPIRATION RATE: 78 BRPM | OXYGEN SATURATION: 98 % | SYSTOLIC BLOOD PRESSURE: 122 MMHG | DIASTOLIC BLOOD PRESSURE: 72 MMHG | WEIGHT: 139 LBS

## 2021-10-08 DIAGNOSIS — U07.1 COVID-19 VIRUS INFECTION: Primary | ICD-10-CM

## 2021-10-08 DIAGNOSIS — E86.0 MILD DEHYDRATION: ICD-10-CM

## 2021-10-08 DIAGNOSIS — R11.2 NAUSEA AND VOMITING, INTRACTABILITY OF VOMITING NOT SPECIFIED, UNSPECIFIED VOMITING TYPE: ICD-10-CM

## 2021-10-08 PROCEDURE — 96361 HYDRATE IV INFUSION ADD-ON: CPT

## 2021-10-08 PROCEDURE — M0243 CASIRIVI AND IMDEVI INFUSION: HCPCS

## 2021-10-08 PROCEDURE — 96375 TX/PRO/DX INJ NEW DRUG ADDON: CPT

## 2021-10-08 PROCEDURE — 96374 THER/PROPH/DIAG INJ IV PUSH: CPT

## 2021-10-08 PROCEDURE — 74011000258 HC RX REV CODE- 258: Performed by: EMERGENCY MEDICINE

## 2021-10-08 PROCEDURE — 74011000636 HC RX REV CODE- 636: Performed by: EMERGENCY MEDICINE

## 2021-10-08 PROCEDURE — 74011250636 HC RX REV CODE- 250/636: Performed by: EMERGENCY MEDICINE

## 2021-10-08 PROCEDURE — 99285 EMERGENCY DEPT VISIT HI MDM: CPT

## 2021-10-08 RX ORDER — SODIUM CHLORIDE 9 MG/ML
1000 INJECTION, SOLUTION INTRAVENOUS ONCE
Status: COMPLETED | OUTPATIENT
Start: 2021-10-08 | End: 2021-10-08

## 2021-10-08 RX ORDER — KETOROLAC TROMETHAMINE 15 MG/ML
15 INJECTION, SOLUTION INTRAMUSCULAR; INTRAVENOUS
Status: COMPLETED | OUTPATIENT
Start: 2021-10-08 | End: 2021-10-08

## 2021-10-08 RX ORDER — ONDANSETRON 2 MG/ML
4 INJECTION INTRAMUSCULAR; INTRAVENOUS
Status: COMPLETED | OUTPATIENT
Start: 2021-10-08 | End: 2021-10-08

## 2021-10-08 RX ORDER — ONDANSETRON 8 MG/1
8 TABLET, ORALLY DISINTEGRATING ORAL
Qty: 12 TABLET | Refills: 0 | Status: SHIPPED | OUTPATIENT
Start: 2021-10-08 | End: 2021-11-04

## 2021-10-08 RX ADMIN — ONDANSETRON 4 MG: 2 INJECTION INTRAMUSCULAR; INTRAVENOUS at 16:00

## 2021-10-08 RX ADMIN — SODIUM CHLORIDE 1000 ML: 900 INJECTION, SOLUTION INTRAVENOUS at 15:58

## 2021-10-08 RX ADMIN — KETOROLAC TROMETHAMINE 15 MG: 15 INJECTION, SOLUTION INTRAMUSCULAR; INTRAVENOUS at 16:03

## 2021-10-08 RX ADMIN — CASIRIVIMAB AND IMDEVIMAB: 600; 600 INJECTION, SOLUTION, CONCENTRATE INTRAVENOUS at 17:02

## 2021-10-08 NOTE — DISCHARGE INSTRUCTIONS
.  Zofran if needed for nausea. Alternate Tylenol and Motrin every 3-4 hours for aches pains fevers and headaches. Warm fluids chicken soup and over-the-counter cough drops for cough. Return if severe trouble breathing or oxygen saturations less than 90% at rest.  Follow-up with your primary care doctor or urgent care in 7 to 10 days if not improving.

## 2021-10-08 NOTE — ED NOTES
I have reviewed discharge instructions with the patient. The patient verbalized understanding. Patient left ED via Discharge Method: ambulatory to Home with son  Opportunity for questions and clarification provided. Patient given 1 scripts. To continue your aftercare when you leave the hospital, you may receive an automated call from our care team to check in on how you are doing. This is a free service and part of our promise to provide the best care and service to meet your aftercare needs.  If you have questions, or wish to unsubscribe from this service please call [phone]. Thank you for Choosing our [facility] Emergency Department.
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
## 2021-10-11 ENCOUNTER — PATIENT OUTREACH (OUTPATIENT)
Dept: CASE MANAGEMENT | Age: 56
End: 2021-10-11

## 2021-10-11 ENCOUNTER — PATIENT OUTREACH (OUTPATIENT)
Dept: OTHER | Age: 56
End: 2021-10-11

## 2021-10-11 NOTE — PROGRESS NOTES
Date/Time:  10/11/2021 10:08 AM   Call within 2 business days of discharge: Yes   Attempted to reach patient by telephone. Left HIPPA compliant message requesting a return call. Will attempt to reach patient again.

## 2021-10-12 ENCOUNTER — PATIENT OUTREACH (OUTPATIENT)
Dept: OTHER | Age: 56
End: 2021-10-12

## 2021-10-12 NOTE — PROGRESS NOTES
Date/Time:  10/11/2021 4:46 PM  Attempted to reach patient by telephone. Left HIPPA compliant message requesting a return call. Will attempt to reach patient again.

## 2021-10-12 NOTE — LETTER
Ms. David Alford  [address] [facility] [address]      Dear Ms. RichardNoah Solitario,      My name is IFRAH Interiano. RN, CCM, Associate Care Manager for [facility] and I have been trying to reach you. I would like to support you during this difficult and stressful time. The COVID-19 pandemic has impacted all of us in different ways and our department is here to support issues that may require services including: navigating protocols and services; addressing high risk factors; providing healthcare oversight and care coordination; or providing social support. The Associate Care Management (ACM) program is a free-of-charge service provided to our associates and their family members covered by the [facility]. We serve our ministry as an additional resource providing confidential assistance to symptomatic patients, and education to associates and family members who are at risk for complications due to KFORJ-53; especially for those over 61years of age, or have a chronic condition, such as heart disease, diabetes, or lung disease. If appropriate, we enroll symptomatic or exposed patients in a free symptom tracker that is accessed from a Smart phone or computer. This will be a way that I will be notified with how patients are feeling each day. If symptoms worsen the program will alert me or my team to give you a call, if it's after hours it will give you the after-hours number to call. [facility] is dedicated to empowering the good health of its community and improving the quality of care and care experiences for associates and their families. We are committed to safeguarding patient confidentiality and privacy, assuring that every associate has the respect he or she deserves in managing their health.  The information shared with your care manager will not be shared with anyone else aside from those you identify as part of your care team, and will only be used to assist you with any identified care needs. Please contact me so that I can provide the services that you need at this challenging time. Resources that are available to you:    Visit [employer] or download the Serafin Haynes jose l for free from the jose l store. Please note this service is for non-emergency COVID-19 visits only. If you are having a medical emergency, call 911 immediately.      Conduit exposure line [phone]   The [facility] and your local  department of health      Sincerely,    IFRAH Botello, RN, [address] [place] [address].   [address] [place] [address] [phone]  [email or web address]

## 2021-10-12 NOTE — PROGRESS NOTES
Date/Time:  10/12/2021 4:22 PM  Attempted to reach patient by telephone. Left HIPPA compliant message requesting a return call. Will attempt to reach patient again.  Will send UTR letter via [employer]

## 2021-10-13 ENCOUNTER — HOSPITAL ENCOUNTER (INPATIENT)
Age: 56
LOS: 3 days | Discharge: HOME | DRG: 177 | End: 2021-10-16
Attending: EMERGENCY MEDICINE | Admitting: INTERNAL MEDICINE
Payer: COMMERCIAL

## 2021-10-13 ENCOUNTER — APPOINTMENT (OUTPATIENT)
Dept: GENERAL RADIOLOGY | Age: 56
DRG: 177 | End: 2021-10-13
Attending: STUDENT IN AN ORGANIZED HEALTH CARE EDUCATION/TRAINING PROGRAM
Payer: COMMERCIAL

## 2021-10-13 ENCOUNTER — PATIENT OUTREACH (OUTPATIENT)
Dept: OTHER | Age: 56
End: 2021-10-13

## 2021-10-13 DIAGNOSIS — J18.9 PNEUMONIA OF RIGHT LUNG DUE TO INFECTIOUS ORGANISM, UNSPECIFIED PART OF LUNG: Primary | ICD-10-CM

## 2021-10-13 DIAGNOSIS — U07.1 COVID-19: ICD-10-CM

## 2021-10-13 DIAGNOSIS — R09.02 HYPOXIA: ICD-10-CM

## 2021-10-13 PROBLEM — J12.82 PNEUMONIA DUE TO COVID-19 VIRUS: Status: ACTIVE | Noted: 2021-10-13

## 2021-10-13 PROBLEM — J96.01 ACUTE RESPIRATORY FAILURE WITH HYPOXIA (HCC): Status: ACTIVE | Noted: 2021-10-13

## 2021-10-13 PROBLEM — T85.9XXA: Status: ACTIVE | Noted: 2019-10-03

## 2021-10-13 PROBLEM — G93.41 ACUTE METABOLIC ENCEPHALOPATHY: Status: ACTIVE | Noted: 2021-10-13

## 2021-10-13 LAB
ALBUMIN SERPL-MCNC: 2.9 G/DL (ref 3.5–5)
ALBUMIN/GLOB SERPL: 0.6 {RATIO} (ref 1.2–3.5)
ALP SERPL-CCNC: 116 U/L (ref 50–136)
ALT SERPL-CCNC: 39 U/L (ref 12–65)
ANION GAP SERPL CALC-SCNC: 9 MMOL/L (ref 7–16)
AST SERPL-CCNC: 34 U/L (ref 15–37)
ATRIAL RATE: 96 BPM
BASOPHILS # BLD: 0 K/UL (ref 0–0.2)
BASOPHILS NFR BLD: 0 % (ref 0–2)
BILIRUB SERPL-MCNC: 0.8 MG/DL (ref 0.2–1.1)
BUN SERPL-MCNC: 17 MG/DL (ref 6–23)
CALCIUM SERPL-MCNC: 9.2 MG/DL (ref 8.3–10.4)
CALCULATED P AXIS, ECG09: 57 DEGREES
CALCULATED R AXIS, ECG10: 63 DEGREES
CALCULATED T AXIS, ECG11: 149 DEGREES
CHLORIDE SERPL-SCNC: 105 MMOL/L (ref 98–107)
CO2 SERPL-SCNC: 28 MMOL/L (ref 21–32)
COVID-19 RAPID TEST, COVR: DETECTED
CREAT SERPL-MCNC: 0.63 MG/DL (ref 0.6–1)
CRP SERPL-MCNC: 7.5 MG/DL (ref 0–0.9)
D DIMER PPP FEU-MCNC: 0.75 UG/ML(FEU)
DIAGNOSIS, 93000: NORMAL
DIFFERENTIAL METHOD BLD: ABNORMAL
EOSINOPHIL # BLD: 0 K/UL (ref 0–0.8)
EOSINOPHIL NFR BLD: 1 % (ref 0.5–7.8)
ERYTHROCYTE [DISTWIDTH] IN BLOOD BY AUTOMATED COUNT: 14.3 % (ref 11.9–14.6)
GLOBULIN SER CALC-MCNC: 4.7 G/DL (ref 2.3–3.5)
GLUCOSE SERPL-MCNC: 105 MG/DL (ref 65–100)
HCT VFR BLD AUTO: 40.5 % (ref 35.8–46.3)
HGB BLD-MCNC: 12.5 G/DL (ref 11.7–15.4)
IMM GRANULOCYTES # BLD AUTO: 0.1 K/UL (ref 0–0.5)
IMM GRANULOCYTES NFR BLD AUTO: 1 % (ref 0–5)
LACTATE SERPL-SCNC: 1.2 MMOL/L (ref 0.4–2)
LYMPHOCYTES # BLD: 0.8 K/UL (ref 0.5–4.6)
LYMPHOCYTES NFR BLD: 12 % (ref 13–44)
MCH RBC QN AUTO: 26.8 PG (ref 26.1–32.9)
MCHC RBC AUTO-ENTMCNC: 30.9 G/DL (ref 31.4–35)
MCV RBC AUTO: 86.9 FL (ref 79.6–97.8)
MONOCYTES # BLD: 0.6 K/UL (ref 0.1–1.3)
MONOCYTES NFR BLD: 9 % (ref 4–12)
NEUTS SEG # BLD: 5 K/UL (ref 1.7–8.2)
NEUTS SEG NFR BLD: 77 % (ref 43–78)
NRBC # BLD: 0 K/UL (ref 0–0.2)
PLATELET # BLD AUTO: 307 K/UL (ref 150–450)
PMV BLD AUTO: 9.6 FL (ref 9.4–12.3)
POTASSIUM SERPL-SCNC: 3.2 MMOL/L (ref 3.5–5.1)
PROCALCITONIN SERPL-MCNC: <0.05 NG/ML
PROT SERPL-MCNC: 7.6 G/DL (ref 6.3–8.2)
Q-T INTERVAL, ECG07: 346 MS
QRS DURATION, ECG06: 74 MS
QTC CALCULATION (BEZET), ECG08: 432 MS
RBC # BLD AUTO: 4.66 M/UL (ref 4.05–5.2)
SODIUM SERPL-SCNC: 142 MMOL/L (ref 136–145)
SOURCE, COVRS: ABNORMAL
TROPONIN-HIGH SENSITIVITY: 6.6 PG/ML (ref 0–14)
TROPONIN-HIGH SENSITIVITY: 8.2 PG/ML (ref 0–14)
VENTRICULAR RATE, ECG03: 94 BPM
WBC # BLD AUTO: 6.4 K/UL (ref 4.3–11.1)

## 2021-10-13 PROCEDURE — 83605 ASSAY OF LACTIC ACID: CPT

## 2021-10-13 PROCEDURE — 85379 FIBRIN DEGRADATION QUANT: CPT

## 2021-10-13 PROCEDURE — 74011000258 HC RX REV CODE- 258: Performed by: EMERGENCY MEDICINE

## 2021-10-13 PROCEDURE — 87040 BLOOD CULTURE FOR BACTERIA: CPT

## 2021-10-13 PROCEDURE — 81001 URINALYSIS AUTO W/SCOPE: CPT

## 2021-10-13 PROCEDURE — 99284 EMERGENCY DEPT VISIT MOD MDM: CPT

## 2021-10-13 PROCEDURE — 74011250636 HC RX REV CODE- 250/636: Performed by: EMERGENCY MEDICINE

## 2021-10-13 PROCEDURE — 84145 PROCALCITONIN (PCT): CPT

## 2021-10-13 PROCEDURE — 93005 ELECTROCARDIOGRAM TRACING: CPT | Performed by: EMERGENCY MEDICINE

## 2021-10-13 PROCEDURE — 87635 SARS-COV-2 COVID-19 AMP PRB: CPT

## 2021-10-13 PROCEDURE — 65270000029 HC RM PRIVATE

## 2021-10-13 PROCEDURE — 86140 C-REACTIVE PROTEIN: CPT

## 2021-10-13 PROCEDURE — 85025 COMPLETE CBC W/AUTO DIFF WBC: CPT

## 2021-10-13 PROCEDURE — 80053 COMPREHEN METABOLIC PANEL: CPT

## 2021-10-13 PROCEDURE — 71045 X-RAY EXAM CHEST 1 VIEW: CPT

## 2021-10-13 PROCEDURE — 84484 ASSAY OF TROPONIN QUANT: CPT

## 2021-10-13 PROCEDURE — 96374 THER/PROPH/DIAG INJ IV PUSH: CPT

## 2021-10-13 RX ORDER — BENZONATATE 100 MG/1
100 CAPSULE ORAL
Status: DISCONTINUED | OUTPATIENT
Start: 2021-10-13 | End: 2021-10-14

## 2021-10-13 RX ORDER — GUAIFENESIN 600 MG/1
600 TABLET, EXTENDED RELEASE ORAL EVERY 12 HOURS
Status: DISCONTINUED | OUTPATIENT
Start: 2021-10-13 | End: 2021-10-15

## 2021-10-13 RX ADMIN — CEFTRIAXONE 1 G: 1 INJECTION, POWDER, FOR SOLUTION INTRAMUSCULAR; INTRAVENOUS at 22:28

## 2021-10-13 NOTE — PROGRESS NOTES
Patient identified as eligible for [address] [place] [address] services. Third telephone outreach attempted post ER visit with COVID+ diagnosis. Left discreet voicemail with this CM confidential contact information.

## 2021-10-13 NOTE — Clinical Note
Status[de-identified] INPATIENT [101]   Type of Bed: Medical [8]   Inpatient Hospitalization Certified Necessary for the Following Reasons: 3.  Patient receiving treatment that can only be provided in an inpatient setting (further clarification in H&P documentation)   Admitting Diagnosis: Multifocal pneumonia [[id number]]   Admitting Diagnosis: Acute metabolic encephalopathy [[id number]]   Admitting Physician: Jenni Walker [[id number]]   Attending Physician: Jenni Walker [[id number]]   Estimated Length of Stay: 3-4 Midnights   Discharge Plan[de-identified] Other (Specify) Comment: TBD

## 2021-10-13 NOTE — ED TRIAGE NOTES
Pt arrives via wheelchair. Pt dx with COVID 9/30. Patient has been declining since that time. O2 sats have been in the 80s.  Pt alert to self

## 2021-10-14 ENCOUNTER — DOCUMENTATION ONLY (OUTPATIENT)
Dept: OTHER | Age: 56
End: 2021-10-14

## 2021-10-14 PROBLEM — E87.6 HYPOKALEMIA: Status: ACTIVE | Noted: 2021-10-14

## 2021-10-14 LAB
ANION GAP SERPL CALC-SCNC: 9 MMOL/L (ref 7–16)
APPEARANCE UR: CLEAR
BACTERIA URNS QL MICRO: 0 /HPF
BILIRUB UR QL: ABNORMAL
BUN SERPL-MCNC: 20 MG/DL (ref 6–23)
CALCIUM SERPL-MCNC: 9.2 MG/DL (ref 8.3–10.4)
CASTS URNS QL MICRO: ABNORMAL /LPF
CHLORIDE SERPL-SCNC: 106 MMOL/L (ref 98–107)
CO2 SERPL-SCNC: 26 MMOL/L (ref 21–32)
COLOR UR: ABNORMAL
CREAT SERPL-MCNC: 0.46 MG/DL (ref 0.6–1)
EPI CELLS #/AREA URNS HPF: ABNORMAL /HPF
ERYTHROCYTE [DISTWIDTH] IN BLOOD BY AUTOMATED COUNT: 14.3 % (ref 11.9–14.6)
GLUCOSE SERPL-MCNC: 124 MG/DL (ref 65–100)
GLUCOSE UR STRIP.AUTO-MCNC: NEGATIVE MG/DL
HCT VFR BLD AUTO: 38.1 % (ref 35.8–46.3)
HGB BLD-MCNC: 11.6 G/DL (ref 11.7–15.4)
HGB UR QL STRIP: NEGATIVE
KETONES UR QL STRIP.AUTO: ABNORMAL MG/DL
LEUKOCYTE ESTERASE UR QL STRIP.AUTO: ABNORMAL
MCH RBC QN AUTO: 27.3 PG (ref 26.1–32.9)
MCHC RBC AUTO-ENTMCNC: 30.4 G/DL (ref 31.4–35)
MCV RBC AUTO: 89.6 FL (ref 79.6–97.8)
NITRITE UR QL STRIP.AUTO: NEGATIVE
NRBC # BLD: 0 K/UL (ref 0–0.2)
PH UR STRIP: 6 [PH] (ref 5–9)
PLATELET # BLD AUTO: 295 K/UL (ref 150–450)
PMV BLD AUTO: 9.7 FL (ref 9.4–12.3)
POTASSIUM SERPL-SCNC: 3.8 MMOL/L (ref 3.5–5.1)
PROT UR STRIP-MCNC: ABNORMAL MG/DL
RBC # BLD AUTO: 4.25 M/UL (ref 4.05–5.2)
RBC #/AREA URNS HPF: ABNORMAL /HPF
SODIUM SERPL-SCNC: 141 MMOL/L (ref 136–145)
SP GR UR REFRACTOMETRY: 1.03 (ref 1–1.02)
TROPONIN-HIGH SENSITIVITY: 8.5 PG/ML (ref 0–14)
UROBILINOGEN UR QL STRIP.AUTO: 1 EU/DL (ref 0.2–1)
WBC # BLD AUTO: 6.1 K/UL (ref 4.3–11.1)
WBC URNS QL MICRO: ABNORMAL /HPF

## 2021-10-14 PROCEDURE — 94640 AIRWAY INHALATION TREATMENT: CPT

## 2021-10-14 PROCEDURE — 85027 COMPLETE CBC AUTOMATED: CPT

## 2021-10-14 PROCEDURE — 65270000029 HC RM PRIVATE

## 2021-10-14 PROCEDURE — 74011250636 HC RX REV CODE- 250/636: Performed by: INTERNAL MEDICINE

## 2021-10-14 PROCEDURE — 36415 COLL VENOUS BLD VENIPUNCTURE: CPT

## 2021-10-14 PROCEDURE — 74011000258 HC RX REV CODE- 258: Performed by: INTERNAL MEDICINE

## 2021-10-14 PROCEDURE — 74011250637 HC RX REV CODE- 250/637: Performed by: INTERNAL MEDICINE

## 2021-10-14 PROCEDURE — 74011250636 HC RX REV CODE- 250/636: Performed by: EMERGENCY MEDICINE

## 2021-10-14 PROCEDURE — 87205 SMEAR GRAM STAIN: CPT

## 2021-10-14 PROCEDURE — 87040 BLOOD CULTURE FOR BACTERIA: CPT

## 2021-10-14 PROCEDURE — 94664 DEMO&/EVAL PT USE INHALER: CPT

## 2021-10-14 PROCEDURE — 80048 BASIC METABOLIC PNL TOTAL CA: CPT

## 2021-10-14 PROCEDURE — 84484 ASSAY OF TROPONIN QUANT: CPT

## 2021-10-14 PROCEDURE — 94760 N-INVAS EAR/PLS OXIMETRY 1: CPT

## 2021-10-14 PROCEDURE — 87150 DNA/RNA AMPLIFIED PROBE: CPT

## 2021-10-14 PROCEDURE — 74011250637 HC RX REV CODE- 250/637: Performed by: HOSPITALIST

## 2021-10-14 RX ORDER — POLYETHYLENE GLYCOL 3350 17 G/17G
17 POWDER, FOR SOLUTION ORAL DAILY PRN
Status: DISCONTINUED | OUTPATIENT
Start: 2021-10-14 | End: 2021-10-16 | Stop reason: HOSPADM

## 2021-10-14 RX ORDER — SODIUM CHLORIDE 0.9 % (FLUSH) 0.9 %
5-40 SYRINGE (ML) INJECTION AS NEEDED
Status: DISCONTINUED | OUTPATIENT
Start: 2021-10-14 | End: 2021-10-16 | Stop reason: HOSPADM

## 2021-10-14 RX ORDER — SODIUM CHLORIDE 0.9 % (FLUSH) 0.9 %
5-40 SYRINGE (ML) INJECTION EVERY 8 HOURS
Status: DISCONTINUED | OUTPATIENT
Start: 2021-10-14 | End: 2021-10-16 | Stop reason: HOSPADM

## 2021-10-14 RX ORDER — DEXAMETHASONE SODIUM PHOSPHATE 100 MG/10ML
6 INJECTION INTRAMUSCULAR; INTRAVENOUS EVERY 24 HOURS
Status: DISCONTINUED | OUTPATIENT
Start: 2021-10-14 | End: 2021-10-16 | Stop reason: HOSPADM

## 2021-10-14 RX ORDER — LISINOPRIL 5 MG/1
10 TABLET ORAL DAILY
Status: DISCONTINUED | OUTPATIENT
Start: 2021-10-14 | End: 2021-10-16 | Stop reason: HOSPADM

## 2021-10-14 RX ORDER — HYDROCODONE BITARTRATE AND HOMATROPINE METHYLBROMIDE 1.5; 5 MG/5ML; MG/5ML
5 SYRUP ORAL
Status: DISCONTINUED | OUTPATIENT
Start: 2021-10-14 | End: 2021-10-16 | Stop reason: HOSPADM

## 2021-10-14 RX ORDER — ONDANSETRON 2 MG/ML
4 INJECTION INTRAMUSCULAR; INTRAVENOUS
Status: DISCONTINUED | OUTPATIENT
Start: 2021-10-14 | End: 2021-10-16 | Stop reason: HOSPADM

## 2021-10-14 RX ORDER — ALBUTEROL SULFATE 90 UG/1
2 AEROSOL, METERED RESPIRATORY (INHALATION)
Status: DISCONTINUED | OUTPATIENT
Start: 2021-10-14 | End: 2021-10-16 | Stop reason: HOSPADM

## 2021-10-14 RX ORDER — ACETAMINOPHEN 650 MG/1
650 SUPPOSITORY RECTAL
Status: DISCONTINUED | OUTPATIENT
Start: 2021-10-14 | End: 2021-10-16 | Stop reason: HOSPADM

## 2021-10-14 RX ORDER — POTASSIUM CHLORIDE 20 MEQ/1
40 TABLET, EXTENDED RELEASE ORAL 2 TIMES DAILY
Status: COMPLETED | OUTPATIENT
Start: 2021-10-14 | End: 2021-10-15

## 2021-10-14 RX ORDER — BUDESONIDE AND FORMOTEROL FUMARATE DIHYDRATE 160; 4.5 UG/1; UG/1
2 AEROSOL RESPIRATORY (INHALATION)
Status: DISCONTINUED | OUTPATIENT
Start: 2021-10-14 | End: 2021-10-16 | Stop reason: HOSPADM

## 2021-10-14 RX ORDER — BENZONATATE 100 MG/1
100 CAPSULE ORAL 3 TIMES DAILY
Status: DISCONTINUED | OUTPATIENT
Start: 2021-10-14 | End: 2021-10-16 | Stop reason: HOSPADM

## 2021-10-14 RX ORDER — PAROXETINE HYDROCHLORIDE 20 MG/1
10 TABLET, FILM COATED ORAL DAILY
Status: DISCONTINUED | OUTPATIENT
Start: 2021-10-14 | End: 2021-10-16 | Stop reason: HOSPADM

## 2021-10-14 RX ORDER — ENOXAPARIN SODIUM 100 MG/ML
40 INJECTION SUBCUTANEOUS DAILY
Status: DISCONTINUED | OUTPATIENT
Start: 2021-10-14 | End: 2021-10-16 | Stop reason: HOSPADM

## 2021-10-14 RX ORDER — ACETAMINOPHEN 325 MG/1
650 TABLET ORAL
Status: DISCONTINUED | OUTPATIENT
Start: 2021-10-14 | End: 2021-10-16 | Stop reason: HOSPADM

## 2021-10-14 RX ORDER — BENZONATATE 100 MG/1
100 CAPSULE ORAL
Status: DISCONTINUED | OUTPATIENT
Start: 2021-10-14 | End: 2021-10-16 | Stop reason: HOSPADM

## 2021-10-14 RX ORDER — PROMETHAZINE HYDROCHLORIDE 25 MG/1
12.5 TABLET ORAL
Status: DISCONTINUED | OUTPATIENT
Start: 2021-10-14 | End: 2021-10-16 | Stop reason: HOSPADM

## 2021-10-14 RX ADMIN — BENZONATATE 100 MG: 100 CAPSULE ORAL at 11:41

## 2021-10-14 RX ADMIN — BENZONATATE 100 MG: 100 CAPSULE ORAL at 21:20

## 2021-10-14 RX ADMIN — CEFTRIAXONE 1 G: 1 INJECTION, POWDER, FOR SOLUTION INTRAMUSCULAR; INTRAVENOUS at 21:20

## 2021-10-14 RX ADMIN — PROMETHAZINE HYDROCHLORIDE 12.5 MG: 25 TABLET ORAL at 21:46

## 2021-10-14 RX ADMIN — Medication 10 ML: at 21:20

## 2021-10-14 RX ADMIN — POTASSIUM CHLORIDE 40 MEQ: 20 TABLET, EXTENDED RELEASE ORAL at 09:49

## 2021-10-14 RX ADMIN — GUAIFENESIN 600 MG: 600 TABLET ORAL at 21:20

## 2021-10-14 RX ADMIN — AZITHROMYCIN MONOHYDRATE 500 MG: 500 INJECTION, POWDER, LYOPHILIZED, FOR SOLUTION INTRAVENOUS at 01:27

## 2021-10-14 RX ADMIN — ACETAMINOPHEN 650 MG: 325 TABLET ORAL at 11:40

## 2021-10-14 RX ADMIN — BENZONATATE 100 MG: 100 CAPSULE ORAL at 16:41

## 2021-10-14 RX ADMIN — POTASSIUM CHLORIDE 40 MEQ: 20 TABLET, EXTENDED RELEASE ORAL at 17:45

## 2021-10-14 RX ADMIN — Medication 10 ML: at 01:28

## 2021-10-14 RX ADMIN — GUAIFENESIN 600 MG: 600 TABLET ORAL at 09:11

## 2021-10-14 RX ADMIN — Medication 10 ML: at 15:48

## 2021-10-14 RX ADMIN — ENOXAPARIN SODIUM 40 MG: 40 INJECTION SUBCUTANEOUS at 09:09

## 2021-10-14 RX ADMIN — ALBUTEROL SULFATE 2 PUFF: 90 AEROSOL, METERED RESPIRATORY (INHALATION) at 13:56

## 2021-10-14 RX ADMIN — BUDESONIDE AND FORMOTEROL FUMARATE DIHYDRATE 2 PUFF: 160; 4.5 AEROSOL RESPIRATORY (INHALATION) at 09:00

## 2021-10-14 RX ADMIN — PAROXETINE HYDROCHLORIDE 10 MG: 20 TABLET, FILM COATED ORAL at 09:11

## 2021-10-14 RX ADMIN — Medication 10 ML: at 06:15

## 2021-10-14 RX ADMIN — ACETAMINOPHEN 650 MG: 325 TABLET ORAL at 01:28

## 2021-10-14 RX ADMIN — LISINOPRIL 10 MG: 5 TABLET ORAL at 09:11

## 2021-10-14 RX ADMIN — PROMETHAZINE HYDROCHLORIDE 12.5 MG: 25 TABLET ORAL at 11:40

## 2021-10-14 RX ADMIN — ALBUTEROL SULFATE 2 PUFF: 90 AEROSOL, METERED RESPIRATORY (INHALATION) at 09:00

## 2021-10-14 RX ADMIN — GUAIFENESIN 600 MG: 600 TABLET ORAL at 01:28

## 2021-10-14 RX ADMIN — ALBUTEROL SULFATE 2 PUFF: 90 AEROSOL, METERED RESPIRATORY (INHALATION) at 20:00

## 2021-10-14 RX ADMIN — DEXAMETHASONE SODIUM PHOSPHATE 6 MG: 10 INJECTION, SOLUTION INTRAMUSCULAR; INTRAVENOUS at 01:28

## 2021-10-14 RX ADMIN — BUDESONIDE AND FORMOTEROL FUMARATE DIHYDRATE 2 PUFF: 160; 4.5 AEROSOL RESPIRATORY (INHALATION) at 20:00
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
## 2021-10-14 NOTE — ROUTINE PROCESS
Bedside report received from  [address]. Assessment done as noted  Respiration even and unlabored 20/min; denies pain or nausea at present. Afebrile this morning. Productive coughing with yellow brown sputum at interval. RA with O2 sats 94% at rest. Remains on Droplet plus isolation for positive COVID-19 screening. Ordered PPE in place and in use. Encouraged to call with needs.
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
## 2021-10-14 NOTE — PROGRESS NOTES
This CM has attempted to reach patient since ER DC on 10/08/21 following worsening symptoms   Patient presented to ER [place] [facility] pneumonia hospital stay

## 2021-10-14 NOTE — PROGRESS NOTES
Hospitalist progress note       Name:  Joy Orourke  Age: 64 y.o. Sex: female   :  1965    MRN:  [id number]   PCP:  None      Admit Date:  10/13/2021  8:30 PM   Presenting Complaint: Positive For Covid-19      Reason for Admission:  Multifocal pneumonia [C01.0]  Acute metabolic encephalopathy [O46.50]  Acute respiratory failure with hypoxia ([facility] [address] [J96.01]  Pneumonia due to COVID-19 virus [U07.1, J12.82]    Assessment & Plan:     Acute respiratory failure with hypoxia  Multifocal pneumonia  COVID-19 infection  10/14:  Patient is currently on room air, wean down from 2 L via NC. Patient reports feeling okay overall, continues to feel lethargic and weak. Amatory/6-minute walk test to be done on 10/15 to assess for home O2 requirement  IV Rocephin and azithromycin, EOT 10/18  Decadron 6 mg IV daily, EOT 10/23  Patient is not a candidate for baricitinib  Status post Regeneron on 10/8 continue Mucinex, Tessalon and Hycodan for cough  Continue Ventolin and budesonide inhaler  Lovenox 40 mg subcu daily      Acute metabolic encephalopathy  78/36:  Patient is at baseline, patient is AO x3    Anxiety and depression : on paxil    Disposition: Potential discharge to home in next 24 to 48 hours if medically stable. Diet: Full  VTE ppx: lovenox  GI ppx: pepcid  CODE STATUS: FULL        Interval history/subjective:     Joy Orourke is a 64 y.o. female with medical history of hypertension, body/depression admitted on 10/13 with acute hypoxic respiratory failure secondary to COVID-19 pneumonia and acute encephalopathy. Patient symptoms started on , and was diagnosed with COVID-19 on . Patient received Regeneron on . In the ED, patient is tachycardic, hypoxic to 89% on room air. Laboratory work up is remarkable for K+ 3.2. X-ray with by basilar right greater than left multifocal pneumonia. EKG with normal sinus rhythm with nonspecific T wave abnormalities.     10/14: Patient seen at bedside, resting in bed comfortably. Patient denies having any shortness of breath, cough, palpitations, fever, chills, nausea vomiting or diarrhea. She is currently on room air. Review of Systems: 10 point review of system is negative except for what mentioned above. .  Objective:     Patient Vitals for the past 24 hrs:   Temp Pulse Resp BP SpO2   10/14/21 0802 97.9 °F (36.6 °C) 81 16 123/86 96 %   10/14/21 0252 99 °F (37.2 °C) 89 18 123/83 96 %   10/14/21 0038 98.9 °F (37.2 °C) (!) 102 18 133/87 94 %   10/13/21 2331  95 17 (!) 143/89 94 %   10/13/21 2131  91 16 133/80 93 %   10/13/21 2034  (!) 102  134/84 97 %   10/13/21 1948     93 %   10/13/21 1942 98 °F (36.7 °C) (!) 114 16 116/69 (!) 89 %       Oxygen Therapy  O2 Sat (%): 96 % (10/14/21 0802)  Pulse via Oximetry: 96 beats per minute (10/13/21 2331)  O2 Device: None (Room air) (10/14/21 0103)  O2 Flow Rate (L/min): 2 l/min (10/13/21 1948)    Body mass index is 21.98 kg/m². 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    Abdomen:   Soft, non distended, nontender, +BS, no guarding/rebound  Extremities:   No edema , pedal pulses present, no digital cyanosis  Skin:   Warn, dry, normnal turgor and texture; no rash, ulcers or nodules appreciated  Neuro:  GCS 15, cranial nerves intact, no motor or sensory deficit  Psychiatric:  No anxiety, calm, cooperative    Data Reviewed:  I have reviewed ordered lab tests and independently visualized imaging below:    Recent Results (from the past 24 hour(s))   CBC WITH AUTOMATED DIFF    Collection Time: 10/13/21  7:48 PM   Result Value Ref Range    WBC 6.4 4.3 - 11.1 K/uL    RBC 4.66 4.05 - 5.2 M/uL    HGB 12.5 11.7 - 15.4 g/dL    HCT 40.5 35.8 - 46.3 %    MCV 86.9 79.6 - 97.8 FL    MCH 26.8 26.1 - 32.9 PG    MCHC 30.9 (L) 31.4 - 35.0 g/dL    RDW 14.3 11.9 - 14.6 %    PLATELET 810 000 - 015 K/uL    MPV 9.6 9.4 - 12.3 FL    ABSOLUTE NRBC 0.00 0.0 - 0.2 K/uL    DF AUTOMATED      NEUTROPHILS 77 43 - 78 %    LYMPHOCYTES 12 (L) 13 - 44 %    MONOCYTES 9 4.0 - 12.0 %    EOSINOPHILS 1 0.5 - 7.8 %    BASOPHILS 0 0.0 - 2.0 %    IMMATURE GRANULOCYTES 1 0.0 - 5.0 %    ABS. NEUTROPHILS 5.0 1.7 - 8.2 K/UL    ABS. LYMPHOCYTES 0.8 0.5 - 4.6 K/UL    ABS. MONOCYTES 0.6 0.1 - 1.3 K/UL    ABS. EOSINOPHILS 0.0 0.0 - 0.8 K/UL    ABS. BASOPHILS 0.0 0.0 - 0.2 K/UL    ABS. IMM. GRANS. 0.1 0.0 - 0.5 K/UL   METABOLIC PANEL, COMPREHENSIVE    Collection Time: 10/13/21  7:48 PM   Result Value Ref Range    Sodium 142 136 - 145 mmol/L    Potassium 3.2 (L) 3.5 - 5.1 mmol/L    Chloride 105 98 - 107 mmol/L    CO2 28 21 - 32 mmol/L    Anion gap 9 7 - 16 mmol/L    Glucose 105 (H) 65 - 100 mg/dL    BUN 17 6 - 23 MG/DL    Creatinine 0.63 0.6 - 1.0 MG/DL    GFR est AA >60 >60 ml/min/1.73m2    GFR est non-AA >60 >60 ml/min/1.73m2    Calcium 9.2 8.3 - 10.4 MG/DL    Bilirubin, total 0.8 0.2 - 1.1 MG/DL    ALT (SGPT) 39 12 - 65 U/L    AST (SGOT) 34 15 - 37 U/L    Alk.  phosphatase 116 50 - 136 U/L    Protein, total 7.6 6.3 - 8.2 g/dL    Albumin 2.9 (L) 3.5 - 5.0 g/dL    Globulin 4.7 (H) 2.3 - 3.5 g/dL    A-G Ratio 0.6 (L) 1.2 - 3.5     PROCALCITONIN    Collection Time: 10/13/21  7:48 PM   Result Value Ref Range    Procalcitonin <0.05 ng/mL   LACTIC ACID    Collection Time: 10/13/21  7:48 PM   Result Value Ref Range    Lactic acid 1.2 0.4 - 2.0 MMOL/L   TROPONIN-HIGH SENSITIVITY    Collection Time: 10/13/21  7:48 PM   Result Value Ref Range    Troponin-High Sensitivity 8.2 0 - 14 pg/mL   EKG, 12 LEAD, INITIAL    Collection Time: 10/13/21  9:22 PM   Result Value Ref Range    Ventricular Rate 94 BPM    Atrial Rate 96 BPM    QRS Duration 74 ms    Q-T Interval 346 ms    QTC Calculation (Bezet) 432 ms    Calculated P Axis 57 degrees    Calculated R Axis 63 degrees    Calculated T Axis 149 degrees    Diagnosis       Normal sinus rhythm  Nonspecific T wave abnormality  Abnormal ECG  No previous ECGs available  Confirmed by Colleen Reina ([id number]) on 10/13/2021 10:17:53 PM     CULTURE, BLOOD    Collection Time: 10/13/21 10:27 PM    Specimen: Blood   Result Value Ref Range    Special Requests: RIGHT  Antecubital        Culture result: NO GROWTH AFTER 7 HOURS     TROPONIN-HIGH SENSITIVITY    Collection Time: 10/13/21 10:27 PM   Result Value Ref Range    Troponin-High Sensitivity 6.6 0 - 14 pg/mL   C REACTIVE PROTEIN, QT    Collection Time: 10/13/21 10:27 PM   Result Value Ref Range    C-Reactive protein 7.5 (H) 0.0 - 0.9 mg/dL   COVID-19 RAPID TEST    Collection Time: 10/13/21 11:08 PM   Result Value Ref Range    Specimen source NOT SPECIFIED      COVID-19 rapid test Detected (AA) NOTD     D DIMER    Collection Time: 10/13/21 11:09 PM   Result Value Ref Range    D DIMER 0.75 (H) <0.56 ug/ml(FEU)       All Micro Results     Procedure Component Value Units Date/Time    CULTURE, BLOOD [[id number]] Collected: 10/13/21 2227    Order Status: Completed Specimen: Blood Updated: 10/14/21 0636     Special Requests: --        RIGHT  Antecubital       Culture result: NO GROWTH AFTER 7 HOURS       CULTURE, BLOOD [[id number]] Collected: 10/14/21 0148    Order Status: Completed Specimen: Blood Updated: 10/14/21 0322    COVID-19 RAPID TEST [[id number]]  (Abnormal) Collected: 10/13/21 2308    Order Status: Completed Specimen: Nasopharyngeal Updated: 10/13/21 2341     Specimen source NOT SPECIFIED        COVID-19 rapid test Detected        Comment:      The specimen is POSITIVE for SARS-CoV-2, the novel coronavirus associated with COVID-19. This test has been authorized by the FDA under an Emergency Use Authorization (EUA) for use by authorized laboratories.         Fact sheet for Healthcare Providers: ConventionUpdate.co.nz  Fact sheet for Patients: iTendency.uy       Methodology: Isothermal Nucleic Acid Amplification               Other Studies:  XR CHEST SNGL V    Result Date: 10/13/2021  EXAM: Single view chest radiograph. INDICATION: Shortness of breath, Covid 19 positive. COMPARISON: Chest radiograph dated March 23, 2019. FINDINGS: Lower cervical ACDF evident. Large right basilar heterogeneous air space opacity. No pneumothorax or pleural effusion. Mild patchy left basilar airspace opacity. Heart appears normal in size. No acute osseous abnormality. 1. Bibasilar right greater than left multifocal pneumonia. Medications:      Problem List:     Hospital Problems as of 10/14/2021 Date Reviewed: 10/13/2021        Codes Class Noted - Resolved POA    Acute metabolic encephalopathy [address]: G93.41  ICD-9-CM: 348.31  10/13/2021 - Present Yes        Multifocal pneumonia ICD-10-CM: J18.9  ICD-9-CM: 229  10/13/2021 - Present Yes        * (Principal) Acute respiratory failure with hypoxia (HCC) ICD-10-CM: J96.01  ICD-9-CM: 518.81  10/13/2021 - Present Yes        Pneumonia due to COVID-19 virus ICD-10-CM: U07.1, J12.82  ICD-9-CM: 480.8, 079.89  10/13/2021 - Present Yes        Complication of prosthetic eye ICD-10-CM: T85. 9XXA  ICD-9-CM: 996.79  10/3/2019 - Present Yes        Anxiety and depression ICD-10-CM: F41.9, F32. A  ICD-9-CM: 300.00, 311  5/23/2016 - Present Yes                 Part of this note was written by using a voice dictation software and the note has been proof read but may still contain some grammatical/other typographical errors.        Aime Solorzano MD  [facility]  October 14, 2021    9:52 PM

## 2021-10-14 NOTE — PROGRESS NOTES
Patient received from emergency room breathing with ease on room air no acute distress noted. Iv canula's  intact no redness or swelling noted at site.  Patient complaining of a headache pain medication given she denies any n/v. Bed locked on lowest position and call bell within reach.

## 2021-10-14 NOTE — ED NOTES
TRANSFER - OUT REPORT:    Verbal report given to ROBBIE Sun(name) on Thao Arrieta  being transferred to 81(unit) for routine progression of care       Report consisted of patients Situation, Background, Assessment and   Recommendations(SBAR). Information from the following report(s) SBAR, ED Summary and MAR was reviewed with the receiving nurse. Lines:   Peripheral IV 10/13/21 Left Antecubital (Active)   Site Assessment Clean, dry, & intact 10/13/21 1947   Phlebitis Assessment 0 10/13/21 1947   Infiltration Assessment 0 10/13/21 1947   Dressing Status Clean, dry, & intact 10/13/21 1947   Hub Color/Line Status Green 10/13/21 1947   Action Taken Blood drawn 10/13/21 1947       Peripheral IV 10/13/21 Right Antecubital (Active)   Site Assessment Clean, dry, & intact 10/13/21 2241   Phlebitis Assessment 0 10/13/21 2241   Infiltration Assessment 0 10/13/21 2241   Dressing Status Clean, dry, & intact 10/13/21 2241        Opportunity for questions and clarification was provided.       Patient transported with:   [employer]

## 2021-10-14 NOTE — ACP (ADVANCE CARE PLANNING)
Advance Care Planning     General Advance Care Planning (ACP) Conversation      Date of Conversation: 10/13/2021  Conducted with: Patient with Decision Making Capacity    Healthcare Decision Maker:     Primary Decision Maker: Shaye Duncan - Skinny - [phone]    Secondary Decision Maker: Ivanna Lea - [phone]  Click here to complete [address] including selection of the Healthcare Decision Maker Relationship (ie \"Primary\")      Today we documented desired Decision Maker(s), who is (are) NOT Legal Next of [facility]. ACP documents are required for decision maker authority.     Content/Action Overview:   Has NO ACP documents/care preferences - requested patient complete ACP documents  Reviewed DNR/DNI and patient elects Full Code (Attempt Resuscitation)  Topics discussed: ventilation preferences and resuscitation preferences       Length of Voluntary ACP Conversation in minutes:  <16 minutes (Non-Billable)    Gabriela Officer, RN

## 2021-10-14 NOTE — PROGRESS NOTES
's initial phone call to explore spiritual needs. Ms. Carrillo Ast denied a need for spiritual care. Chaplains remain available for care.      Flavio Jose 68  Board Certified

## 2021-10-14 NOTE — PROGRESS NOTES
MSN, CM:  Spoke with patient this AM about discharge planning. Patient lives with her son Stanislav Harris" his girlfriend Jani Mcnair" and their two children (1 y/o and 10 mts) in own house with 2 steps for entrance. Patient has two other daughters \"Shaye and Adenike Hale" which lives locally. Patient is independent with all ADL's and requires no equipment for ambulation. Patient denies any home oxygen, HH or rehab in the past.  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 Patient confirms she has no PCP but is acceptable of referral sent to Physician Services. PT consulted for evaluation and recommendations. Case Management will continue to follow for any discharge needs. Care Management Interventions  PCP Verified by CM: Yes (NO PCP)  Mode of Transport at Discharge:  Other (see comment) (family to transport)  Health Maintenance Reviewed: Yes  Physical Therapy Consult: Yes  Support Systems: Child(gigi)  Confirm Follow Up Transport: Self  Freedom of Choice List was Provided with Basic Dialogue that Supports the Patient's Individualized Plan of Care/Goals, Treatment Preferences and Shares the Quality Data Associated with the Providers?: Yes  Discharge Location  Discharge Placement: Home

## 2021-10-15 PROBLEM — R78.81 BACTEREMIA DUE TO GRAM-POSITIVE BACTERIA: Status: ACTIVE | Noted: 2021-10-15

## 2021-10-15 LAB
ACC. NO. FROM MICRO ORDER, ACCP: ABNORMAL
ANION GAP SERPL CALC-SCNC: 5 MMOL/L (ref 7–16)
BUN SERPL-MCNC: 20 MG/DL (ref 6–23)
CALCIUM SERPL-MCNC: 9.3 MG/DL (ref 8.3–10.4)
CHLORIDE SERPL-SCNC: 110 MMOL/L (ref 98–107)
CO2 SERPL-SCNC: 25 MMOL/L (ref 21–32)
CREAT SERPL-MCNC: 0.6 MG/DL (ref 0.6–1)
ERYTHROCYTE [DISTWIDTH] IN BLOOD BY AUTOMATED COUNT: 14.3 % (ref 11.9–14.6)
GLUCOSE SERPL-MCNC: 117 MG/DL (ref 65–100)
HCT VFR BLD AUTO: 37.2 % (ref 35.8–46.3)
HGB BLD-MCNC: 11.3 G/DL (ref 11.7–15.4)
INTERPRETATION: ABNORMAL
MCH RBC QN AUTO: 26.6 PG (ref 26.1–32.9)
MCHC RBC AUTO-ENTMCNC: 30.4 G/DL (ref 31.4–35)
MCV RBC AUTO: 87.5 FL (ref 79.6–97.8)
MECA (METHICILLIN-RESISTANCE GENES), MRGP: DETECTED
NRBC # BLD: 0 K/UL (ref 0–0.2)
PLATELET # BLD AUTO: 364 K/UL (ref 150–450)
PMV BLD AUTO: 9.7 FL (ref 9.4–12.3)
POTASSIUM SERPL-SCNC: 4 MMOL/L (ref 3.5–5.1)
RBC # BLD AUTO: 4.25 M/UL (ref 4.05–5.2)
SODIUM SERPL-SCNC: 140 MMOL/L (ref 136–145)
STAPHYLOCOCCUS, STAPP: DETECTED
WBC # BLD AUTO: 8.8 K/UL (ref 4.3–11.1)

## 2021-10-15 PROCEDURE — 65270000029 HC RM PRIVATE

## 2021-10-15 PROCEDURE — 74011250636 HC RX REV CODE- 250/636: Performed by: INTERNAL MEDICINE

## 2021-10-15 PROCEDURE — 36415 COLL VENOUS BLD VENIPUNCTURE: CPT

## 2021-10-15 PROCEDURE — 94760 N-INVAS EAR/PLS OXIMETRY 1: CPT

## 2021-10-15 PROCEDURE — 97530 THERAPEUTIC ACTIVITIES: CPT

## 2021-10-15 PROCEDURE — 74011250637 HC RX REV CODE- 250/637: Performed by: INTERNAL MEDICINE

## 2021-10-15 PROCEDURE — 74011250636 HC RX REV CODE- 250/636: Performed by: HOSPITALIST

## 2021-10-15 PROCEDURE — 74011250637 HC RX REV CODE- 250/637: Performed by: HOSPITALIST

## 2021-10-15 PROCEDURE — 97161 PT EVAL LOW COMPLEX 20 MIN: CPT

## 2021-10-15 PROCEDURE — 85027 COMPLETE CBC AUTOMATED: CPT

## 2021-10-15 PROCEDURE — 94640 AIRWAY INHALATION TREATMENT: CPT

## 2021-10-15 PROCEDURE — 80048 BASIC METABOLIC PNL TOTAL CA: CPT

## 2021-10-15 PROCEDURE — 74011000258 HC RX REV CODE- 258: Performed by: INTERNAL MEDICINE

## 2021-10-15 RX ORDER — BENZONATATE 100 MG/1
100 CAPSULE ORAL 3 TIMES DAILY
Qty: 21 CAPSULE | Refills: 1 | Status: SHIPPED | OUTPATIENT
Start: 2021-10-15 | End: 2021-10-16

## 2021-10-15 RX ORDER — ALBUTEROL SULFATE 90 UG/1
2 AEROSOL, METERED RESPIRATORY (INHALATION)
Qty: 18 G | Refills: 1 | Status: SHIPPED | OUTPATIENT
Start: 2021-10-15 | End: 2021-10-16

## 2021-10-15 RX ORDER — DOXYCYCLINE 100 MG/1
100 TABLET ORAL 2 TIMES DAILY
Qty: 10 TABLET | Refills: 0 | Status: SHIPPED | OUTPATIENT
Start: 2021-10-15 | End: 2021-10-16 | Stop reason: SDUPTHER

## 2021-10-15 RX ORDER — DEXAMETHASONE 6 MG/1
6 TABLET ORAL
Qty: 8 TABLET | Refills: 0 | Status: SHIPPED | OUTPATIENT
Start: 2021-10-15 | End: 2021-10-16 | Stop reason: SDUPTHER

## 2021-10-15 RX ORDER — VANCOMYCIN/0.9 % SOD CHLORIDE 1.5G/250ML
1500 PLASTIC BAG, INJECTION (ML) INTRAVENOUS ONCE
Status: COMPLETED | OUTPATIENT
Start: 2021-10-15 | End: 2021-10-15

## 2021-10-15 RX ORDER — GUAIFENESIN 600 MG/1
600 TABLET, EXTENDED RELEASE ORAL EVERY 12 HOURS
Qty: 20 TABLET | Refills: 1 | Status: SHIPPED | OUTPATIENT
Start: 2021-10-15 | End: 2021-10-16

## 2021-10-15 RX ORDER — ASPIRIN 81 MG/1
81 TABLET ORAL DAILY
Qty: 30 TABLET | Refills: 0 | Status: SHIPPED | OUTPATIENT
Start: 2021-10-15 | End: 2021-10-16 | Stop reason: SDUPTHER

## 2021-10-15 RX ORDER — BUDESONIDE AND FORMOTEROL FUMARATE DIHYDRATE 160; 4.5 UG/1; UG/1
2 AEROSOL RESPIRATORY (INHALATION) 2 TIMES DAILY
Qty: 10.2 G | Refills: 1 | Status: SHIPPED | OUTPATIENT
Start: 2021-10-15 | End: 2021-10-16

## 2021-10-15 RX ORDER — GUAIFENESIN 600 MG/1
1200 TABLET, EXTENDED RELEASE ORAL EVERY 12 HOURS
Status: DISCONTINUED | OUTPATIENT
Start: 2021-10-15 | End: 2021-10-16 | Stop reason: HOSPADM

## 2021-10-15 RX ORDER — HYDROCODONE BITARTRATE AND HOMATROPINE METHYLBROMIDE 1.5; 5 MG/5ML; MG/5ML
5 SYRUP ORAL
Qty: 105 ML | Refills: 0 | Status: SHIPPED | OUTPATIENT
Start: 2021-10-15 | End: 2021-10-16

## 2021-10-15 RX ORDER — CEFPODOXIME PROXETIL 200 MG/1
200 TABLET, FILM COATED ORAL 2 TIMES DAILY
Qty: 10 TABLET | Refills: 0 | Status: SHIPPED | OUTPATIENT
Start: 2021-10-15 | End: 2021-10-16 | Stop reason: SDUPTHER

## 2021-10-15 RX ADMIN — ALBUTEROL SULFATE 2 PUFF: 90 AEROSOL, METERED RESPIRATORY (INHALATION) at 04:00

## 2021-10-15 RX ADMIN — BENZONATATE 100 MG: 100 CAPSULE ORAL at 21:29

## 2021-10-15 RX ADMIN — DEXAMETHASONE SODIUM PHOSPHATE 6 MG: 10 INJECTION, SOLUTION INTRAMUSCULAR; INTRAVENOUS at 00:41

## 2021-10-15 RX ADMIN — Medication 5 ML: at 21:30

## 2021-10-15 RX ADMIN — POTASSIUM CHLORIDE 40 MEQ: 20 TABLET, EXTENDED RELEASE ORAL at 08:36

## 2021-10-15 RX ADMIN — CEFTRIAXONE 1 G: 1 INJECTION, POWDER, FOR SOLUTION INTRAMUSCULAR; INTRAVENOUS at 21:29

## 2021-10-15 RX ADMIN — BENZONATATE 100 MG: 100 CAPSULE ORAL at 16:21

## 2021-10-15 RX ADMIN — BENZONATATE 100 MG: 100 CAPSULE ORAL at 08:35

## 2021-10-15 RX ADMIN — Medication 10 ML: at 14:00

## 2021-10-15 RX ADMIN — ENOXAPARIN SODIUM 40 MG: 40 INJECTION SUBCUTANEOUS at 08:35

## 2021-10-15 RX ADMIN — BUDESONIDE AND FORMOTEROL FUMARATE DIHYDRATE 2 PUFF: 160; 4.5 AEROSOL RESPIRATORY (INHALATION) at 07:46

## 2021-10-15 RX ADMIN — GUAIFENESIN 1200 MG: 600 TABLET ORAL at 21:29

## 2021-10-15 RX ADMIN — ALBUTEROL SULFATE 2 PUFF: 90 AEROSOL, METERED RESPIRATORY (INHALATION) at 15:14

## 2021-10-15 RX ADMIN — AZITHROMYCIN MONOHYDRATE 250 MG: 500 INJECTION, POWDER, LYOPHILIZED, FOR SOLUTION INTRAVENOUS at 00:42

## 2021-10-15 RX ADMIN — ALBUTEROL SULFATE 2 PUFF: 90 AEROSOL, METERED RESPIRATORY (INHALATION) at 20:40

## 2021-10-15 RX ADMIN — GUAIFENESIN 600 MG: 600 TABLET ORAL at 08:36

## 2021-10-15 RX ADMIN — BUDESONIDE AND FORMOTEROL FUMARATE DIHYDRATE 2 PUFF: 160; 4.5 AEROSOL RESPIRATORY (INHALATION) at 20:40

## 2021-10-15 RX ADMIN — Medication 10 ML: at 06:00

## 2021-10-15 RX ADMIN — ALBUTEROL SULFATE 2 PUFF: 90 AEROSOL, METERED RESPIRATORY (INHALATION) at 07:46

## 2021-10-15 RX ADMIN — VANCOMYCIN HYDROCHLORIDE 1500 MG: 10 INJECTION, POWDER, LYOPHILIZED, FOR SOLUTION INTRAVENOUS at 13:00

## 2021-10-15 RX ADMIN — PAROXETINE HYDROCHLORIDE 10 MG: 20 TABLET, FILM COATED ORAL at 08:35

## 2021-10-15 RX ADMIN — POTASSIUM CHLORIDE 40 MEQ: 20 TABLET, EXTENDED RELEASE ORAL at 17:56

## 2021-10-15 RX ADMIN — ALBUTEROL SULFATE 2 PUFF: 90 AEROSOL, METERED RESPIRATORY (INHALATION) at 12:10

## 2021-10-15 RX ADMIN — LISINOPRIL 10 MG: 5 TABLET ORAL at 08:36

## 2021-10-15 NOTE — PROGRESS NOTES
MSN, CM:  Patient with some anxiety this AM alone with positive blood cultures. Patient's discharge plan is home with no services when medically stable. Case Management will continue to follow.

## 2021-10-15 NOTE — PROGRESS NOTES
Hospitalist progress note       Name:  Alexander Panchal  Age: 64 y.o. Sex: female   :  1965    MRN:  [id number]   PCP:  None      Admit Date:  10/13/2021  8:30 PM   Presenting Complaint: Positive For Covid-19      Reason for Admission:  Multifocal pneumonia [H26.7]  Acute metabolic encephalopathy [K51.35]  Acute respiratory failure with hypoxia ([facility] [address] [J96.01]  Pneumonia due to COVID-19 virus [U07.1, J12.82]    Assessment & Plan:   Bacteremia due to  [place]:  10/15:  Blood cultures from 10/14+ for GPC suspected of MRSA. Started on IV vancomycin. Ordered for sputum culture    Acute respiratory failure with hypoxia  Multifocal pneumonia  COVID-19 infection  10/15:  Patient weaned down to room air, no episodes of worsening shortness of breath or hypoxia. Patient likely will need oxygen with ambulation as documented below:  Oxygen Qualifier          Room air: SpO2 with O2 and liter flow   Resting SpO2 95% n/a   Ambulating SpO2  87% 89% on 1L, 93% on 2L        Decadron 6 mg IV daily, EOT 10/23  Patient is not a candidate for baricitinib  Status post Regeneron on 10/8 continue Mucinex, Tessalon and Hycodan for cough  Continue Ventolin and budesonide inhaler  Lovenox 40 mg subcu daily      Acute metabolic encephalopathy  :  Patient is at baseline, patient is AO x3    Anxiety and depression : on paxil    Disposition: Patient discharge has been delayed as blood cultures are positive for GPC, will need to evaluate further. Diet: Full  VTE ppx: lovenox  GI ppx: pepcid  CODE STATUS: FULL        Interval history/subjective:     Alexander Panchal is a 64 y.o. female with medical history of hypertension, body/depression admitted on 10/13 with acute hypoxic respiratory failure secondary to COVID-19 pneumonia and acute encephalopathy. Patient symptoms started on , and was diagnosed with COVID-19 on . Patient received Regeneron on .     In the ED, patient is tachycardic, hypoxic to 89% on room air.  Laboratory work up is remarkable for K+ 3.2. X-ray with by basilar right greater than left multifocal pneumonia. EKG with normal sinus rhythm with nonspecific T wave abnormalities. 10/15: Patient seen at bedside, continues to be on room air at rest.  She reports feeling better with reports of feeling lethargic and weak. Denies having any cough, shortness of breath, nausea vomiting or diarrhea. Review of Systems: 10 point review of system is negative except for what mentioned above. Objective:     Patient Vitals for the past 24 hrs:   Temp Pulse Resp BP SpO2   10/15/21 1210     94 %   10/15/21 1128 98.4 °F (36.9 °C) 83 18 117/71 96 %   10/15/21 0747 97.6 °F (36.4 °C) 78 18 129/79 95 %   10/15/21 0323 98.4 °F (36.9 °C) 75 18 119/71 94 %   10/14/21 2307 99 °F (37.2 °C) 77 18 102/65 95 %   10/14/21 1842 98.4 °F (36.9 °C) 67 18 112/65 95 %   10/14/21 1517 98.1 °F (36.7 °C) 79 20 103/71 94 %   10/14/21 1356     97 %       Oxygen Therapy  O2 Sat (%): 94 % (10/15/21 1210)  Pulse via Oximetry: 92 beats per minute (10/15/21 1210)  O2 Device: None (Room air) (10/15/21 1210)  O2 Flow Rate (L/min): 2 l/min (10/13/21 1948)    Body mass index is 23.41 kg/m². 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    Abdomen:   Soft, non distended, nontender, +BS, no guarding/rebound  Extremities:   No edema , pedal pulses present, no digital cyanosis  Skin:   Warn, normnal turgor and texture; no rash, ulcers or nodules appreciated  Neuro:  GCS 15, cranial nerves intact, no motor or sensory deficit  Psychiatric:  No anxiety, calm, cooperative    Data Reviewed:  I have reviewed ordered lab tests and independently visualized imaging below:    Recent Results (from the past 24 hour(s))   METABOLIC PANEL, BASIC Collection Time: 10/15/21  7:48 AM   Result Value Ref Range    Sodium 140 136 - 145 mmol/L    Potassium 4.0 3.5 - 5.1 mmol/L    Chloride 110 (H) 98 - 107 mmol/L    CO2 25 21 - 32 mmol/L    Anion gap 5 (L) 7 - 16 mmol/L    Glucose 117 (H) 65 - 100 mg/dL    BUN 20 6 - 23 MG/DL    Creatinine 0.60 0.6 - 1.0 MG/DL    GFR est AA >60 >60 ml/min/1.73m2    GFR est non-AA >60 >60 ml/min/1.73m2    Calcium 9.3 8.3 - 10.4 MG/DL   CBC W/O DIFF    Collection Time: 10/15/21  7:48 AM   Result Value Ref Range    WBC 8.8 4.3 - 11.1 K/uL    RBC 4.25 4.05 - 5.2 M/uL    HGB 11.3 (L) 11.7 - 15.4 g/dL    HCT 37.2 35.8 - 46.3 %    MCV 87.5 79.6 - 97.8 FL    MCH 26.6 26.1 - 32.9 PG    MCHC 30.4 (L) 31.4 - 35.0 g/dL    RDW 14.3 11.9 - 14.6 %    PLATELET 656 683 - 941 K/uL    MPV 9.7 9.4 - 12.3 FL    ABSOLUTE NRBC 0.00 0.0 - 0.2 K/uL       All Micro Results     Procedure Component Value Units Date/Time    BLOOD CULTURE ID PANEL [[id number]]  (Abnormal) Collected: 10/14/21 0148    Order Status: Completed Specimen: Blood Updated: 10/15/21 1150     Acc. no. from Micro Order [id number]     Staphylococcus Detected        Comment: RESULTS VERIFIED, PHONED TO AND READ BACK BY  ROSA ESCAMILLA RN @ 8192 ON 10/15/21 BY AMM          mecA (Methicillin-Resistance Genes) Detected        Comment: Presence of mecA is highly indicative of methicillin resistance. The test does not replace traditional culture and susceptibilities        INTERPRETATION       Gram positive cocci in clusters. Identified by realtime PCR as  Coagulase negative Staphylococci           Comment: A single positive culture of coagulase negative Staph is likely to be a contaminant in adult patients. Consider discontinuation of antibiotics for gram positive bloodstream infections if patient asymptomatic. THIS TEST DOES NOT REPLACE SENSITIVITY TESTING.        CULTURE, BLOOD [[id number]] Collected: 10/14/21 0148    Order Status: Completed Specimen: Blood Updated: 10/15/21 1148     Special Requests: --        RIGHT  HAND       GRAM STAIN       GRAM POS COCCI IN CLUSTERS            PEDIATRIC BOTTLE               RESULTS VERIFIED, PHONED TO AND READ BACK BY ROSA ESCAMILLA RN @ 0271 ON 10/15/21 BY AMM           Culture result:       CULTURE IN PROGRESS,FURTHER UPDATES TO FOLLOW                  Refer to Blood Culture ID Panel Accession  [id number]      CULTURE, BLOOD [[id number]] Collected: 10/13/21 2227    Order Status: Completed Specimen: Blood Updated: 10/14/21 0636     Special Requests: --        RIGHT  Antecubital       Culture result: NO GROWTH AFTER 7 HOURS       COVID-19 RAPID TEST [[id number]]  (Abnormal) Collected: 10/13/21 2308    Order Status: Completed Specimen: Nasopharyngeal Updated: 10/13/21 2341     Specimen source NOT SPECIFIED        COVID-19 rapid test Detected        Comment:      The specimen is POSITIVE for SARS-CoV-2, the novel coronavirus associated with COVID-19. This test has been authorized by the FDA under an Emergency Use Authorization (EUA) for use by authorized laboratories. Fact sheet for Healthcare Providers: Partnerbytete.co.nz  Fact sheet for Patients: American Red CrossdaAlexis Bittar.co.nz       Methodology: Isothermal Nucleic Acid Amplification               Other Studies:  No results found.       Medications:      Problem List:     Hospital Problems as of 10/15/2021 Date Reviewed: 10/13/2021        Codes Class Noted - Resolved POA    * (Principal) Pneumonia due to COVID-19 virus ICD-10-CM: U07.1, J12.82  ICD-9-CM: 480.8, 079.89  10/13/2021 - Present Yes        Acute respiratory failure with hypoxia (HCC) ICD-10-CM: J96.01  ICD-9-CM: 518.81  10/13/2021 - Present Yes        Bacteremia due to Gram-positive bacteria ICD-10-CM: R78.81  ICD-9-CM: 790.7  10/15/2021 - Present Unknown        Hypokalemia ICD-10-CM: E87.6  ICD-9-CM: 276.8  10/14/2021 - Present Unknown        Acute metabolic encephalopathy LKM-61-KD: G93.41  ICD-9-CM: 348.31 10/13/2021 - Present Yes        Multifocal pneumonia ICD-10-CM: J18.9  ICD-9-CM: 833  10/13/2021 - Present Yes        Complication of prosthetic eye ICD-10-CM: T85. 9XXA  ICD-9-CM: 996.79  10/3/2019 - Present Yes        Anxiety and depression ICD-10-CM: F41.9, F32. A  ICD-9-CM: 300.00, 311  5/23/2016 - Present Yes                 Part of this note was written by using a voice dictation software and the note has been proof read but may still contain some grammatical/other typographical errors.        Jennifer Cannon MD  [facility]  October 15, 2021    9:52 PM

## 2021-10-15 NOTE — PROGRESS NOTES
Patient received from day shift breathing with ease on room air no acute distress noted. Iv canula intact no redness or swelling noted at site. Patient denies any pain or n/v. Bed locked on lowest position and call bell within reach.

## 2021-10-15 NOTE — PROGRESS NOTES
ACUTE PHYSICAL THERAPY GOALS:  (Developed with and agreed upon by patient and/or caregiver. )    LTG:    (1.)Ms. Mariel Javed will ambulate with MODIFIED INDEPENDENCE for 20 feet with the least restrictive device within 7 treatment day(s). ________________________________________________________________________________________________      PHYSICAL THERAPY ASSESSMENT: Initial Assessment, Discharge and AM PT Treatment Day # 1      Ada Pablito is a 64 y.o. female   PRIMARY DIAGNOSIS: Pneumonia due to COVID-19 virus  Multifocal pneumonia [E71.6]  Acute metabolic encephalopathy [B63.15]  Acute respiratory failure with hypoxia ([address] [J96.01]  Pneumonia due to COVID-19 virus [U07.1, J12.82]       Reason for Referral:    ICD-10: Treatment Diagnosis: Generalized Muscle Weakness (M62.81)  INPATIENT: Payor: Reji Salmeron / Plan: [facility] PAY / Product Type: Commerical /     ASSESSMENT:     REHAB RECOMMENDATIONS:   Recommendation to date pending progress:  Setting:   No further skilled therapy   Equipment:    None     PRIOR LEVEL OF FUNCTION:  (Prior to Hospitalization) INITIAL/CURRENT LEVEL OF FUNCTION:  (Most Recently Demonstrated)   Bed Mobility:   Independent  Sit to Stand:   Independent  Transfers:   Independent  Gait/Mobility:   Independent Bed Mobility:   Supervision  Sit to Stand:   Supervision  Transfers:   Supervision  Gait/Mobility:   Modified Independent     ASSESSMENT:  Ms. Mariel [facility]aved [facility] and strength in B LEs. She performed bed mobility and transfers today with supervision. Pt also ambulated several bouts around room on room air with supervision-Junior. Pt also instructed on importance of activity pacing, energy conservation, and chair exercises. She appears to be functioning close to her baseline and will be discharged from PT at this time. SUBJECTIVE:   Ms. Mariel Javed states, \"Thank you. \"    SOCIAL HISTORY/LIVING ENVIRONMENT: Lives with son and independent with ambulation PTA  Support Systems: Child(gigi)  OBJECTIVE:     PAIN: VITAL SIGNS: LINES/DRAINS:   Pre Treatment: Pain Screen  Pain Scale 1: Numeric (0 - 10)  Pain Intensity 1: 0  Post Treatment: 0   none  O2 Device: None (Room air)     GROSS EVALUATION:  B LE Within Functional Limits Abnormal/ Functional Abnormal/ Non-Functional (see comments) Not Tested Comments:   AROM [x] [] [] []    PROM [] [] [] []    Strength [x] [] [] []    Balance [x] [] [] []    Posture [] [] [] []    Sensation [] [] [] []    Coordination [] [] [] []    Tone [] [] [] []    Edema [] [] [] []    Activity Tolerance [] [x] [] [] 89-90% after ambulating    [] [] [] []      COGNITION/  PERCEPTION: Intact Impaired   (see comments) Comments:   Orientation [x] []    Vision [] []    Hearing [] []    Command Following [x] []    Safety Awareness [x] []     [] []      MOBILITY: I Mod I S SBA CGA Min Mod Max Total  NT x2 Comments:   Bed Mobility    Rolling [] [x] [] [] [] [] [] [] [] [] []    Supine to Sit [] [x] [] [] [] [] [] [] [] [] []    Scooting [] [x] [] [] [] [] [] [] [] [] []    Sit to Supine [] [] [] [] [] [] [] [] [] [] []    Transfers    Sit to Stand [] [] [x] [] [] [] [] [] [] [] []    Bed to Chair [] [] [] [] [] [] [] [] [] [] []    Stand to Sit [] [] [x] [] [] [] [] [] [] [] []    I=Independent, Mod I=Modified Independent, S=Supervision, SBA=Standby Assistance, CGA=Contact Guard Assistance,   Min=Minimal Assistance, Mod=Moderate Assistance, Max=Maximal Assistance, Total=Total Assistance, NT=Not Tested  GAIT: I Mod I S SBA CGA Min Mod Max Total  NT x2 Comments:   Level of Assistance [] [x] [x] [] [] [] [] [] [] [] []    Distance 40 ft    DME None    Gait Quality WFL    Weightbearing Status N/A     I=Independent, Mod I=Modified Independent, S=Supervision, SBA=Standby Assistance, CGA=Contact Guard Assistance,   Min=Minimal Assistance, Mod=Moderate Assistance, Max=Maximal Assistance, Total=Total Assistance, NT=[facility] AM-PAC 6 Clicks   Basic Mobility Inpatient Short Form       How much difficulty does the patient currently have. .. Unable A Lot A Little None   1. Turning over in bed (including adjusting bedclothes, sheets and blankets)? [] 1   [] 2   [] 3   [x] 4   2. Sitting down on and standing up from a chair with arms ( e.g., wheelchair, bedside commode, etc.)   [] 1   [] 2   [] 3   [x] 4   3. Moving from lying on back to sitting on the side of the bed? [] 1   [] 2   [] 3   [x] 4   How much help from another person does the patient currently need. .. Total A Lot A Little None   4. Moving to and from a bed to a chair (including a wheelchair)? [] 1   [] 2   [] 3   [x] 4   5. Need to walk in hospital room? [] 1   [] 2   [] 3   [x] 4   6. Climbing 3-5 steps with a railing? [] 1   [] 2   [] 3   [x] 4   © 2007, Trustees of [address] [place] [address], under license to [employer]. All rights reserved     Score:  Initial: 24 Most Recent: X (Date: 10/15/21 )    Interpretation of Tool:  Represents activities that are increasingly more difficult (i.e. Bed mobility, Transfers, Gait). PLAN:   FREQUENCY/DURATION: PT Plan of Care:  (shavonne, tx, DC) for duration of hospital stay or until stated goals are met, whichever comes first.    TREATMENT:     EVALUATION: Low Complexity : (Untimed Charge)    TREATMENT:   ($$ Therapeutic Activity: 8-22 mins    )  Therapeutic Activity (8 Minutes): Therapeutic activity included Ambulation on level ground and seated exercises to improve functional Mobility and Activity tolerance.     TREATMENT GRID:   Date:  10/15/21 Date:   Date:     Activity/Exercise Parameters Parameters Parameters   APs 1 x 20 B     LAQ 1 x 15 B     Hip ABD/ADD 1 x 15 B     Seated marching 1 x 15 B                             AFTER TREATMENT POSITION/PRECAUTIONS:  Bed and Needs within reach    INTERDISCIPLINARY COLLABORATION:  RN/PCT and PT/PTA    TOTAL TREATMENT DURATION:  PT Patient Time In/Time Out  Time In: 0924  Time Out: [facility] Record, DPT

## 2021-10-15 NOTE — PROGRESS NOTES
Patient resting quietly in bed. Bed locked on lowest position and call bell within reach. No acute distress noted. Droplet plus isolation remains in place. Report to be given to oncoming shift.

## 2021-10-15 NOTE — PROGRESS NOTES
[facility] Pharmacokinetic Monitoring Service - Vancomycin     Mariana Jerome is a 64 y.o. female starting on vancomycin therapy for bloodstream infection. Pharmacy consulted by Dr. Cami Sparks for monitoring and adjustment. Target Concentration: Goal AUC/TRISH 400-600 mg*hr/L    Additional Antimicrobials: ceftriaxone    Pertinent Laboratory Values: Wt Readings from Last 1 Encounters:   10/15/21 58.1 kg (128 lb)     Temp Readings from Last 1 Encounters:   10/15/21 98.4 °F (36.9 °C)     No components found for: PROCAL  Recent Labs     10/15/21  0748 10/14/21  0759 10/13/21  1948   BUN 20 20 17   CREA 0.60 0.46* 0.63   WBC 8.8 6.1 6.4   PCT  --   --  <0.05   LAC  --   --  1.2     Estimated Creatinine Clearance: 71 mL/min (by C-G formula based on SCr of 0.6 mg/dL). No results found for: [place] Cárdenas    MRSA Nasal Swab: N/A. Non-respiratory infection. .    Plan:  Dosing recommendations based on Bayesian software  Start vancomycin 1000mg q12h  Anticipated AUC of 528 and trough concentration of 15.6 at steady state  Renal labs as indicated   Vancomycin concentration ordered for 10/16 @ 0400   Pharmacy will continue to monitor patient and adjust therapy as indicated    Thank you for the consult,  ALEKSANDAR Roa

## 2021-10-15 NOTE — PROGRESS NOTES
Oxygen Qualifier       Room air: SpO2 with O2 and liter flow   Resting SpO2 95% n/a   Ambulating SpO2  87% 89% on 1L, 93% on 2L       Completed by:    Rafael Marquez, RT

## 2021-10-16 VITALS
OXYGEN SATURATION: 95 % | WEIGHT: 125 LBS | DIASTOLIC BLOOD PRESSURE: 68 MMHG | HEART RATE: 79 BPM | SYSTOLIC BLOOD PRESSURE: 110 MMHG | HEIGHT: 62 IN | BODY MASS INDEX: 23 KG/M2 | TEMPERATURE: 98.1 F | RESPIRATION RATE: 18 BRPM

## 2021-10-16 LAB
ANION GAP SERPL CALC-SCNC: 6 MMOL/L (ref 7–16)
BACTERIA SPEC CULT: ABNORMAL
BACTERIA SPEC CULT: ABNORMAL
BUN SERPL-MCNC: 15 MG/DL (ref 6–23)
CALCIUM SERPL-MCNC: 9.1 MG/DL (ref 8.3–10.4)
CHLORIDE SERPL-SCNC: 113 MMOL/L (ref 98–107)
CO2 SERPL-SCNC: 23 MMOL/L (ref 21–32)
CREAT SERPL-MCNC: 0.56 MG/DL (ref 0.6–1)
ERYTHROCYTE [DISTWIDTH] IN BLOOD BY AUTOMATED COUNT: 14.6 % (ref 11.9–14.6)
GLUCOSE SERPL-MCNC: 118 MG/DL (ref 65–100)
GRAM STN SPEC: ABNORMAL
HCT VFR BLD AUTO: 34.7 % (ref 35.8–46.3)
HGB BLD-MCNC: 10.4 G/DL (ref 11.7–15.4)
MCH RBC QN AUTO: 27 PG (ref 26.1–32.9)
MCHC RBC AUTO-ENTMCNC: 30 G/DL (ref 31.4–35)
MCV RBC AUTO: 90.1 FL (ref 79.6–97.8)
NRBC # BLD: 0 K/UL (ref 0–0.2)
PLATELET # BLD AUTO: 359 K/UL (ref 150–450)
PMV BLD AUTO: 9.7 FL (ref 9.4–12.3)
POTASSIUM SERPL-SCNC: 4.6 MMOL/L (ref 3.5–5.1)
RBC # BLD AUTO: 3.85 M/UL (ref 4.05–5.2)
SERVICE CMNT-IMP: ABNORMAL
SODIUM SERPL-SCNC: 142 MMOL/L (ref 136–145)
VANCOMYCIN SERPL-MCNC: 17.2 UG/ML
WBC # BLD AUTO: 8.6 K/UL (ref 4.3–11.1)

## 2021-10-16 PROCEDURE — 94640 AIRWAY INHALATION TREATMENT: CPT

## 2021-10-16 PROCEDURE — 74011250636 HC RX REV CODE- 250/636: Performed by: HOSPITALIST

## 2021-10-16 PROCEDURE — 80048 BASIC METABOLIC PNL TOTAL CA: CPT

## 2021-10-16 PROCEDURE — 80202 ASSAY OF VANCOMYCIN: CPT

## 2021-10-16 PROCEDURE — 74011250636 HC RX REV CODE- 250/636: Performed by: INTERNAL MEDICINE

## 2021-10-16 PROCEDURE — 36415 COLL VENOUS BLD VENIPUNCTURE: CPT

## 2021-10-16 PROCEDURE — 74011250637 HC RX REV CODE- 250/637: Performed by: HOSPITALIST

## 2021-10-16 PROCEDURE — 74011250637 HC RX REV CODE- 250/637: Performed by: INTERNAL MEDICINE

## 2021-10-16 PROCEDURE — 85027 COMPLETE CBC AUTOMATED: CPT

## 2021-10-16 PROCEDURE — 94760 N-INVAS EAR/PLS OXIMETRY 1: CPT

## 2021-10-16 RX ORDER — GUAIFENESIN 600 MG/1
600 TABLET, EXTENDED RELEASE ORAL EVERY 12 HOURS
Qty: 20 TABLET | Refills: 1 | Status: SHIPPED | OUTPATIENT
Start: 2021-10-16 | End: 2021-10-26

## 2021-10-16 RX ORDER — ALBUTEROL SULFATE 90 UG/1
2 AEROSOL, METERED RESPIRATORY (INHALATION)
Qty: 18 G | Refills: 1 | Status: SHIPPED | OUTPATIENT
Start: 2021-10-16

## 2021-10-16 RX ORDER — BUDESONIDE AND FORMOTEROL FUMARATE DIHYDRATE 160; 4.5 UG/1; UG/1
2 AEROSOL RESPIRATORY (INHALATION) 2 TIMES DAILY
Qty: 10.2 G | Refills: 1 | Status: SHIPPED | OUTPATIENT
Start: 2021-10-16 | End: 2021-12-16

## 2021-10-16 RX ORDER — DOXYCYCLINE 100 MG/1
100 TABLET ORAL 2 TIMES DAILY
Qty: 10 TABLET | Refills: 0 | Status: SHIPPED | OUTPATIENT
Start: 2021-10-16 | End: 2021-10-21

## 2021-10-16 RX ORDER — BENZONATATE 100 MG/1
100 CAPSULE ORAL 3 TIMES DAILY
Qty: 21 CAPSULE | Refills: 1 | Status: SHIPPED | OUTPATIENT
Start: 2021-10-16 | End: 2021-10-23

## 2021-10-16 RX ORDER — HYDROCODONE BITARTRATE AND HOMATROPINE METHYLBROMIDE 1.5; 5 MG/5ML; MG/5ML
5 SYRUP ORAL
Qty: 105 ML | Refills: 0 | Status: SHIPPED | OUTPATIENT
Start: 2021-10-16 | End: 2021-10-23

## 2021-10-16 RX ORDER — DEXAMETHASONE 6 MG/1
6 TABLET ORAL
Qty: 8 TABLET | Refills: 0 | Status: SHIPPED | OUTPATIENT
Start: 2021-10-16 | End: 2021-10-24

## 2021-10-16 RX ORDER — ASPIRIN 81 MG/1
81 TABLET ORAL DAILY
Qty: 30 TABLET | Refills: 0 | Status: SHIPPED | OUTPATIENT
Start: 2021-10-16 | End: 2021-11-15

## 2021-10-16 RX ORDER — CEFPODOXIME PROXETIL 200 MG/1
200 TABLET, FILM COATED ORAL 2 TIMES DAILY
Qty: 10 TABLET | Refills: 0 | Status: SHIPPED | OUTPATIENT
Start: 2021-10-16 | End: 2021-10-21

## 2021-10-16 RX ADMIN — BENZONATATE 100 MG: 100 CAPSULE ORAL at 08:16

## 2021-10-16 RX ADMIN — ALBUTEROL SULFATE 2 PUFF: 90 AEROSOL, METERED RESPIRATORY (INHALATION) at 07:58

## 2021-10-16 RX ADMIN — BUDESONIDE AND FORMOTEROL FUMARATE DIHYDRATE 2 PUFF: 160; 4.5 AEROSOL RESPIRATORY (INHALATION) at 07:58

## 2021-10-16 RX ADMIN — ALBUTEROL SULFATE 2 PUFF: 90 AEROSOL, METERED RESPIRATORY (INHALATION) at 11:34

## 2021-10-16 RX ADMIN — DEXAMETHASONE SODIUM PHOSPHATE 6 MG: 10 INJECTION, SOLUTION INTRAMUSCULAR; INTRAVENOUS at 00:38

## 2021-10-16 RX ADMIN — LISINOPRIL 10 MG: 5 TABLET ORAL at 08:16

## 2021-10-16 RX ADMIN — Medication 5 ML: at 06:07

## 2021-10-16 RX ADMIN — ALBUTEROL SULFATE 2 PUFF: 90 AEROSOL, METERED RESPIRATORY (INHALATION) at 00:15

## 2021-10-16 RX ADMIN — ALBUTEROL SULFATE 2 PUFF: 90 AEROSOL, METERED RESPIRATORY (INHALATION) at 04:05

## 2021-10-16 RX ADMIN — VANCOMYCIN HYDROCHLORIDE 1000 MG: 1 INJECTION, POWDER, LYOPHILIZED, FOR SOLUTION INTRAVENOUS at 00:38

## 2021-10-16 RX ADMIN — PAROXETINE HYDROCHLORIDE 10 MG: 20 TABLET, FILM COATED ORAL at 08:16

## 2021-10-16 RX ADMIN — GUAIFENESIN 1200 MG: 600 TABLET ORAL at 08:17

## 2021-10-16 RX ADMIN — ENOXAPARIN SODIUM 40 MG: 40 INJECTION SUBCUTANEOUS at 08:17

## 2021-10-16 NOTE — PROGRESS NOTES
Beside shift report received from [facility] RN  Patient lying in bed  Respirations even and unlabored  No signs of distress  No needs expressed at this time  Safety measures in place

## 2021-10-16 NOTE — DISCHARGE SUMMARY
Hospitalist Discharge Summary     Patient ID:  Aleksandra Campbell  [id number]  62 y.o.  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 female with medical history of hypertension, body/depression admitted on 10/13 with acute hypoxic respiratory failure secondary to COVID-19 pneumonia and acute encephalopathy. Patient symptoms started on April 30, and was diagnosed with COVID-19 on October 1. Patient received Regeneron on October 8.     In the ED, patient is tachycardic, hypoxic to 89% on room air. Laboratory work up is remarkable for K+ 3.2. X-ray with by basilar right greater than left multifocal pneumonia. EKG with normal sinus rhythm with nonspecific T wave abnormalities. Hospital Course:   Bacteremia due to [address]:  10/16: Blood cultures have contaminant, GPC's coag negative staph aureus. Antibiotics have been stopped.     Acute respiratory failure with hypoxia  Multifocal pneumonia  COVID-19 infection  10/16: resolved  Patient weaned down to room air, no episodes of worsening shortness of breath or hypoxia.   Patient likely will need oxygen with ambulation as documented below:  Oxygen Qualifier          Room air: SpO2 with O2 and liter flow   Resting SpO2 95% n/a   Ambulating SpO2  87% 89% on 1L, 93% on 2L         Status post Regeneron on 10/8 continue Mucinex, Tessalon and Hycodan for cough  Continue Ventolin and budesonide inhaler on discharge  Continue Decadron as ordered        Acute metabolic encephalopathy  60/07:  Patient is at confusion has resolved, patient's mentation is at baseline.     Anxiety and depression : on paxil     Disposition: Patient to be discharged home today. Significant Diagnostic Studies:   EXAM: Single view chest radiograph. INDICATION: Shortness of breath, Covid 19 positive. COMPARISON: Chest radiograph dated March 23, 2019.     FINDINGS:  Lower cervical ACDF evident. Large right basilar heterogeneous air space  opacity. No pneumothorax or pleural effusion. Mild patchy left basilar airspace  opacity. Heart appears normal in size. No acute osseous abnormality.     IMPRESSION  1. Bibasilar right greater than left multifocal pneumonia. Labs: Results:       Chemistry Recent Labs     10/15/21  0748 10/14/21  0759 10/13/21  1948   * 124* 105*    141 142   K 4.0 3.8 3.2*   * 106 105   CO2 25 26 28   BUN 20 20 17   CREA 0.60 0.46* 0.63   CA 9.3 9.2 9.2   AGAP 5* 9 9   AP  --   --  116   TP  --   --  7.6   ALB  --   --  2.9*   GLOB  --   --  4.7*   AGRAT  --   --  0.6*      CBC w/Diff Recent Labs     10/15/21  0748 10/14/21  0759 10/13/21  1948   WBC 8.8 6.1 6.4   RBC 4.25 4.25 4.66   HGB 11.3* 11.6* 12.5   HCT 37.2 38.1 40.5    295 307   GRANS  --   --  77   LYMPH  --   --  12*   EOS  --   --  1      Cardiac Enzymes No results for input(s): CPK, CKND1, MI in the last 72 hours. No lab exists for component: CKRMB, TROIP   Coagulation No results for input(s): PTP, INR, APTT, INREXT in the last 72 hours.     Lipid Panel Lab Results   Component Value Date/Time    Cholesterol, total 186 07/30/2015 09:36 AM    HDL Cholesterol 65 07/30/2015 09:36 AM    LDL, calculated 104 (H) 07/30/2015 09:36 AM    VLDL, calculated 17 07/30/2015 09:36 AM    Triglyceride 83 07/30/2015 09:36 AM      BNP No results for input(s): BNPP in the last 72 hours. 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 Abdomen:                  Soft, non distended, nontender, +BS, no guarding/rebound  Extremities:               No edema , pedal pulses present, no digital cyanosis  Skin:                           Warn, normnal turgor and texture; no rash, ulcers or nodules appreciated  Neuro:                        GCS 15, cranial nerves intact, no motor or sensory deficit  Psychiatric:                No anxiety, calm, cooperative    Disposition: home  Discharge Condition: stable  Patient Instructions:   Current Discharge Medication List      START taking these medications    Details   albuterol (PROVENTIL HFA, VENTOLIN HFA, PROAIR HFA) 90 mcg/actuation inhaler Take 2 Puffs by inhalation every four (4) hours as needed for Wheezing. Qty: 18 g, Refills: 1  Start date: 10/15/2021      benzonatate (TESSALON) 100 mg capsule Take 1 Capsule by mouth three (3) times daily for 7 days. Qty: 21 Capsule, Refills: 1  Start date: 10/15/2021, End date: 10/22/2021      budesonide-formoteroL (SYMBICORT) 160-4.5 mcg/actuation HFAA Take 2 Puffs by inhalation two (2) times a day.   Qty: 10.2 g, Refills: 1  Start date: 10/15/2021      guaiFENesin ER (MUCINEX) 600 mg ER tablet Take 1 Tablet by mouth every twelve (12) hours for 10 days. Qty: 20 Tablet, Refills: 1  Start date: 10/15/2021, End date: 10/25/2021      HYDROcodone-homatropine (HYCODAN) 5-1.5 mg/5 mL (5 mL) oral solution Take 5 mL by mouth three (3) times daily as needed for Cough for up to 7 days. Max Daily Amount: 15 mL. Qty: 105 mL, Refills: 0  Start date: 10/15/2021, End date: 10/22/2021    Associated Diagnoses: COVID-19      cefpodoxime (VANTIN) 200 mg tablet Take 1 Tablet by mouth two (2) times a day for 5 days. Qty: 10 Tablet, Refills: 0  Start date: 10/15/2021, End date: 10/20/2021      doxycycline (ADOXA) 100 mg tablet Take 1 Tablet by mouth two (2) times a day for 5 days. Qty: 10 Tablet, Refills: 0  Start date: 10/15/2021, End date: 10/20/2021      aspirin delayed-release 81 mg tablet Take 1 Tablet by mouth daily for 30 days. Qty: 30 Tablet, Refills: 0  Start date: 10/15/2021, End date: 11/14/2021      dexAMETHasone (DECADRON) 6 mg tablet Take 1 Tablet by mouth Daily (before breakfast) for 8 days. Qty: 8 Tablet, Refills: 0  Start date: 10/15/2021, End date: 10/23/2021         CONTINUE these medications which have NOT CHANGED    Details   ondansetron (ZOFRAN ODT) 8 mg disintegrating tablet Take 1 Tablet by mouth every eight (8) hours as needed for Nausea. Qty: 12 Tablet, Refills: 0      lisinopril (PRINIVIL, ZESTRIL) 10 mg tablet Take 1 Tab by mouth daily. Qty: 90 Tab, Refills: 03      PARoxetine (PAXIL) 10 mg tablet TAKE 1 TABLET BY MOUTH EVERY DAY  Qty: 90 Tab, Refills: 3      traMADol (ULTRAM) 50 mg tablet TAKE 2 TABLETS BY MOUTH EVERY 6 HOURS  Refills: 2             Activity: Activity as tolerated  Diet: Regular Diet  Wound Care: None needed    Follow-up  · Follow-up with PCP next 1 to 2 weeks.   Time spent to discharge patient 35 minutes  Signed:  Greg Galvez MD  10/16/2021  7:29 AM

## 2021-10-16 NOTE — PROGRESS NOTES
Patient will be d/c home today. Patient has qualified for home 02. Patient will received a tank for transport home. CM made contact with Shine Ferrara from [facility] and informed him of the referral.  CM made contact with patient via phone to discuss d/c needs. Patient has requested a referral be made to [facility]. Patient had question about her FMLA. CM encouraged patient to make contact with her supervisor for further instruction on FMLA. Family will transport home. Patient has met all treatment goals / milestones. CM will continue to monitor and remain available for any needs that may occur. Care Management Interventions  PCP Verified by CM: Yes (NO PCP)  Mode of Transport at Discharge: Other (see comment) (family to transport)  Discharge Durable Medical Equipment: Yes (patient qualified for home 02.   Provider will be [facility].  )  Health Maintenance Reviewed: Yes  Physical Therapy Consult: Yes  Support Systems: Child(gigi)  Confirm Follow Up Transport: Self  Freedom of Choice List was Provided with Basic Dialogue that Supports the Patient's Individualized Plan of Care/Goals, Treatment Preferences and Shares the Quality Data Associated with the Providers?: Yes   Resource Information Provided?: No  Discharge Location  Discharge Placement: Home

## 2021-10-16 NOTE — DISCHARGE INSTRUCTIONS
Patient Education        [address] After Treatment for COVID-19: Care Instructions  Overview     You are being sent home from the hospital after being treated for COVID-19. Being in the hospital can be hard, especially if you've been in the intensive care unit (ICU). Even though you're going home, you probably don't feel well yet. Healing from COVID-19 takes time. You may feel very tired for weeks or months afterward, especially if you were on a ventilator. It will take time to get back to your old level of activity. Some people may have long-lasting health problems. But most people can look forward to feeling a little better every day. If you were on a ventilator, your throat may be sore and your voice hoarse or raspy for a while. After leaving the hospital, some people have feelings of anxiety and depression. They may have nightmares. Or in their mind they may relive events that happened in the hospital (flashbacks). Reach out to your doctor if you're having trouble with these symptoms. Your doctor will tell you if you need to isolate yourself at home, and when you can end isolation. How can you self-isolate when you have COVID-19? If you have COVID-19, there are things you can do to help avoid spreading the virus to others. · Limit contact with people in your home. If possible, stay in a separate bedroom and use a separate bathroom. · Wear a mask when you are around other people. · If you have to leave home, avoid crowds and try to stay at least 6 feet away from other people. · Avoid contact with pets and other animals. · Cover your mouth and nose with a tissue when you cough or sneeze. Then throw it in the trash right away. · Wash your hands often, especially after you cough or sneeze. Use soap and water, and scrub for at least 20 seconds. If soap and water aren't available, use an alcohol-based hand . · Don't share personal household items.  These include bedding, towels, cups and glasses, and eating utensils. · [address] in the warmest water allowed for the fabric type, and dry it completely. It's okay to wash other people's laundry with yours. · Clean and disinfect your home. Use household  and disinfectant wipes or sprays. Follow-up care is a key part of your treatment and safety. Be sure to make and go to all appointments, and call your doctor if you are having problems. It's also a good idea to know your test results and keep a list of the medicines you take. How can you care for yourself at home? · Get plenty of rest. It can help you feel better. · Be kind to yourself if it's taking longer than you expected to feel better. You've been through a stressful time. · Get up and walk around every hour or two while you're resting. Slowly increase your activity as you start to feel better. · Eat healthy foods. · Drink plenty of fluids. If you have kidney, heart, or liver disease and have to limit fluids, talk with your doctor before you increase the amount of fluids you drink. · If needed, take acetaminophen (Tylenol) or ibuprofen (Advil, Motrin) to reduce a fever. It may also help with muscle aches. Read and follow all instructions on the label. When should you call for help? Call 911 anytime you think you may need emergency care. For example, call if you have life-threatening symptoms, such as:    · You have severe trouble breathing. (You can't talk at all.)     · You have constant chest pain or pressure.     · You are severely dizzy or lightheaded.     · You are confused or can't think clearly.     · You have pale, gray, or blue-colored skin or lips.     · You pass out (lose consciousness) or are very hard to wake up. Call your doctor now or seek immediate medical care if:    · You have moderate trouble breathing. (You can't speak a full sentence.)     · You are coughing up blood (more than about 1 teaspoon).     · You have signs of low blood pressure.  These include feeling lightheaded; being too weak to stand; and having cold, pale, clammy skin. Watch closely for changes in your health, and be sure to contact your doctor if:    · Your symptoms get worse.     · You are not getting better as expected.     · You have new or worse symptoms of anxiety, depression, nightmares, or flashbacks. Call before you go to the doctor's office. Follow their instructions. And wear a mask. Current as of: July 1, 2021               Content Version: 13.0  © 2006-2021 [employer]. Care instructions adapted under license by [employer] (which disclaims liability or warranty for this information). If you have questions about a medical condition or this instruction, always ask your healthcare professional. [address] any warranty or liability for your use of this information.

## 2021-10-16 NOTE — PROGRESS NOTES
VANCO DAILY FOLLOW UP NOTE  [facility] Pharmacokinetic Monitoring Service - Vancomycin    Consulting Provider: Dr. Fabi Lazcano   Indication: bloodstream infection  Target Concentration: Goal AUC/TRISH 400-600 mg*hr/L  Day of Therapy: 2  Additional Antimicrobials: ceftriaxone    Pertinent Laboratory Values: Wt Readings from Last 1 Encounters:   10/16/21 56.7 kg (125 lb)     Temp Readings from Last 1 Encounters:   10/16/21 98.1 °F (36.7 °C)     No components found for: PROCAL  Recent Labs     10/16/21  0653 10/15/21  0748 10/14/21  0759 10/13/21  1948 10/13/21  1948   BUN 15 20 20   < > 17   CREA 0.56* 0.60 0.46*   < > 0.63   WBC 8.6 8.8 6.1   < > 6.4   PCT  --   --   --   --  <0.05   LAC  --   --   --   --  1.2    < > = values in this interval not displayed. Estimated Creatinine Clearance: 71 mL/min (A) (by C-G formula based on SCr of 0.56 mg/dL (L)). Lab Results   Component Value Date/Time    Vancomycin, random 17.2 10/16/2021 06:53 AM       MRSA Nasal Swab: N/A. Non-respiratory infection. .      Assessment:  Date/Time Dose Concentration AUC   10/16 0653 1000 mg q12h 17.2 515   Note: Serum concentrations collected for AUC dosing may appear elevated if collected in close proximity to the dose administered, this is not necessarily an indication of toxicity    Plan:  Current dosing regimen is therapeutic  Continue current dose  Repeat vancomycin concentrations will be ordered as clinically indicated  Pharmacy will continue to monitor patient and adjust therapy as indicated    Thank you for the consult,  Johnson Gomez, PHARMD

## 2021-10-16 NOTE — PROGRESS NOTES
Problem: Risk for Spread of Infection  Goal: Prevent transmission of infectious organism to others  Description: Prevent the transmission of infectious organisms to other patients, staff members, and visitors. Outcome: Resolved/Met     Problem: Patient Education:  Go to Education Activity  Goal: Patient/Family Education  Outcome: Resolved/Met     Problem: Airway Clearance - Ineffective  Goal: Achieve or maintain patent airway  Outcome: Resolved/Met     Problem: Gas Exchange - Impaired  Goal: Absence of hypoxia  Outcome: Resolved/Met  Goal: Promote optimal lung function  Outcome: Resolved/Met     Problem: Breathing Pattern - Ineffective  Goal: Ability to achieve and maintain a regular respiratory rate  Outcome: Resolved/Met     Problem:  Body Temperature -  Risk of, Imbalanced  Goal: Ability to maintain a body temperature within defined limits  Outcome: Resolved/Met  Goal: Will regain or maintain usual level of consciousness  Outcome: Resolved/Met  Goal: Complications related to the disease process, condition or treatment will be avoided or minimized  Outcome: Resolved/Met     Problem: Isolation Precautions - Risk of Spread of Infection  Goal: Prevent transmission of infectious organism to others  Outcome: Resolved/Met     Problem: Nutrition Deficits  Goal: Optimize nutrtional status  Outcome: Resolved/Met     Problem: Risk for Fluid Volume Deficit  Goal: Maintain normal heart rhythm  Outcome: Resolved/Met  Goal: Maintain absence of muscle cramping  Outcome: Resolved/Met  Goal: Maintain normal serum potassium, sodium, calcium, phosphorus, and pH  Outcome: Resolved/Met     Problem: Loneliness or Risk for Loneliness  Goal: Demonstrate positive use of time alone when socialization is not possible  Outcome: Resolved/Met     Problem: Fatigue  Goal: Verbalize increase energy and improved vitality  Outcome: Resolved/Met     Problem: Patient Education: Go to Patient Education Activity  Goal: Patient/Family Education  Outcome: Resolved/Met     Problem: Falls - Risk of  Goal: *Absence of Falls  Description: Document Myrna Fall Risk and appropriate interventions in the flowsheet.   Outcome: Resolved/Met     Problem: Patient Education: Go to Patient Education Activity  Goal: Patient/Family Education  Outcome: Resolved/Met     Problem: Patient Education: Go to Patient Education Activity  Goal: Patient/Family Education  Outcome: Resolved/Met

## 2021-10-18 ENCOUNTER — PATIENT OUTREACH (OUTPATIENT)
Dept: OTHER | Age: 56
End: 2021-10-18

## 2021-10-18 LAB
BACTERIA SPEC CULT: NORMAL
SERVICE CMNT-IMP: NORMAL

## 2021-10-18 NOTE — PROGRESS NOTES
Patient on report as eligible for Case Management. Left discreet message on voicemail with this CM contact information. Will attempt to contact again to offer [facility] [address] Management services for Transitions of Care.

## 2021-10-19 ENCOUNTER — PATIENT OUTREACH (OUTPATIENT)
Dept: OTHER | Age: 56
End: 2021-10-19

## 2021-10-19 NOTE — PROGRESS NOTES
Care Transitions Initial Call    Call within 2 business days of discharge: Yes     Patient: Nico Romero Patient : 1965 MRN: [id number]    Last Discharge [address]       Complaint Diagnosis Description Type Department Provider    10/13/21 Positive For Covid-19 Pneumonia of right lung due to infectious organism, unspecified part of lung . .. ED to Hosp-Admission (Discharged) (ADMIT) [id number] Alicia Rosario MD; [place], [place]. .. Was this an external facility discharge? No Discharge Facility:   [facility]    Challenges to be reviewed by the provider   Additional needs identified to be addressed with provider: no  none         Method of communication with provider : Patient needs a PCP - CM to arrange    Discussed COVID-19 related testing which was available at this time. Test results were positive. Patient informed of results, if available? yes   · COVID positive results initially on 10/01/21, Symptoms started 21; Advance Care Planning:   Does patient have an Advance Directive: not on file. Inpatient Readmission Risk score: Unplanned Readmit Risk Score: 15    Was this a readmission? no   Patient stated reason for the admission:  Confusion, COVID pneumonia    63 yo female presented to [facility] ER on 10/13/21 and was admitted for COVID pneumonia, hypoxic resp failure, Acute metabolic encephalopathy with confusion, Hypokalemia;  CXR  Bibasilar right greater than left multifocal pneumonia; Had received Regeron previous ER visit on 10/08/21  PMH:   HTN, L prosthetic eye, anxiety/depression    Current Symptoms:   Cough, shortness of breath at rest with speech, desats below 90% with activity, On home oxygen 2L NC continuous;       Potential Risks for Readmission  Risk for dehydration and less than required nutrition,   Risk for Ineffective Airway Clearance,    Risk for Injury, potential increase in clotting/embolus risk     Interventions to address risk factors:   Goal: Demonstrates behaviors to self-manage improving airway clearance;  Goal: Demonstrates self-management skills to continue daily water and nutritional needs;               Goal: Demonstrates no signs of red flags or other post COVID complications    Care Transition Nurse (CTN) contacted the patient by telephone to perform post hospital discharge assessment. Verified name and  with patient as identifiers. Provided introduction to self, and explanation of the CTN role. CTN reviewed discharge instructions, medical action plan and red flags with patient who verbalized understanding. Were discharge instructions available to patient? yes. Reviewed appropriate site of care based on symptoms and resources available to patient including: Serafin Haynes, When to call 911 and and reach out to this CM. [place] Patient given an opportunity to ask questions and does not have any further questions or concerns at this time. The patient agrees to contact the PCP office for questions related to their healthcare. Medication reconciliation was performed with patient, who verbalizes understanding of administration of home medications. Advised obtaining a 90-day supply of all daily and as-needed medications. Referral to Pharm D needed: no     Home Health/Outpatient orders at discharge: none    Durable Medical Equipment ordered at discharge: [address]:   [address] provider on Flex plan  Durable Medical Equipment received:   Yes before DC    Covid Risk Education    Educated patient about risk for severe COVID-19 due to risk factors according to CDC guidelines. ACM reviewed discharge instructions, medical action plan and red flag symptoms with the patient who verbalized understanding. Discussed COVID vaccination status: no. Education provided on COVID-19 vaccination as appropriate. Discussed exposure protocols and quarantine with CDC Guidelines.  Patient was given an opportunity to verbalize any questions and concerns and agrees to contact ACM or health care provider for questions related to their healthcare. Was patient discharged with a pulse oximeter? Patient already owns a Pulse Oximeter. Discussed and confirmed pulse oximeter discharge instructions and when to notify provider or seek emergency care. · Reports at rest O2 Sat on 2L NC remains 93%- 94%  · Reports activity level O2 Sat without O2 2L NC drops to 83%-84%  · Reports quick recovery with return of O2 2L at rest back to 94%    Discussed follow-up appointments. If no appointment was previously scheduled, appointment scheduling offered: yes. Is follow up appointment scheduled within 7 days of discharge? yes. · No PCP established within [facility]  · Patient requested appointment with new PCP and with [facility];  · CM scheduled new patient initial PCP appointment with [facility]  · Via virtual visit on 10/20/21 at [phone], Júnior Triana NP;  · CM outreach to [facility], but no referral order in chart. · After initial call with patient this AM, CM unable to reach to confirm new PCP appointment  · At least 6 attempts in follow up - no response, no return call  · CM outreach to emergency contact, daughter Huber, with request to have patient call this CM, to which she agreed;   Still no response;    [facility] follow up appointment(s):   Future Appointments   Date Time Provider Jimy Wilson   10/20/2021  1:40 PM Estrada Ceja NP [facility]     Non-[facility] follow up appointment(s): N/A    Plan for follow-up call in 1-2 days based on severity of symptoms and risk factors. Plan for next call: self management- oxygen, O2 Sats management, symptom assessment and assessment to start weaning oxygen at home  CTN provided contact information for future needs.     Goals Addressed                 This Visit's Progress       General     Demonstrates self-management skills to continue daily water and nutritional needs;          Pneumonia     Demonstrates no signs of red flags or other post COVID complications          Post Hospitalization     Demonstrates behaviors to self-manage improving airway clearance;

## 2021-10-20 ENCOUNTER — PATIENT OUTREACH (OUTPATIENT)
Dept: OTHER | Age: 56
End: 2021-10-20

## 2021-10-20 PROBLEM — H11.232 SYMBLEPHARON OF LEFT EYE: Status: ACTIVE | Noted: 2019-10-03

## 2021-10-20 PROBLEM — Z90.01 HISTORY OF ENUCLEATION OF LEFT EYEBALL: Status: ACTIVE | Noted: 2019-10-03

## 2021-10-20 NOTE — ACP (ADVANCE CARE PLANNING)
Advance Care Planning   Healthcare Decision Maker:       Primary Decision Maker: Shaye Duncan - Child - [phone]    Secondary Decision Maker: Christal Donato - [phone]    Click here to complete [address] including selection of the Healthcare Decision Maker Relationship (ie \"Primary\")  Today we discussed Healthcare Decision Makers. The patient is considering options.

## 2021-10-20 NOTE — PROGRESS NOTES
Care Transitions Follow Up Call    Challenges to be reviewed by the provider   Additional needs identified to be addressed with provider: yes  Incentive Spirometer needed           Method of communication with provider : Staff message    Care Transition Nurse (CTN) contacted the patient by telephone to follow up after admission on 10/13/21 to 10/16/21. Verified name and  with patient as identifiers. Addressed changes since last contact: DME- Reviewed current oxygen settings at home;  · Reports sleeping at times and pulling off her oxygen accidentally;  · Reports up to bathroom without oxygen on;  · O2 Sats drops to 89% this AM off oxygen, to 83% with activity day before;  · Quickly recovers back to 94% at rest;  · Wants to be caring for her dogs;  · Reports in bed yesterday, sleeping all day;  · CM encouraged patient to sit up, increase activity a little each day;  · CM encouraged to take as many sitting up rest breaks after activity to allow oxygenation to improve; Follow up appointment completed? yes. Was follow up appointment scheduled within 7 days of discharge? yes. New PCP appointment today at [phone] [facility];    CTN reviewed discharge instructions, medical action plan and red flags with patient and discussed any barriers to care and/or understanding of plan of care after discharge. Discussed appropriate site of care based on symptoms and resources available to patient including: Serafin Haynes and When to call 911. The patient agrees to contact the PCP office for questions related to their healthcare.      Patients top risk factors for readmission: depression, medical condition-COVID pneumonia with hypoxic episodes off ocygen still and Fatigue   · Risk for dehydration and less than required nutrition,   · Risk for Ineffective Airway Clearance,    · Risk for Injury, potential increase in clotting/embolus risk    Interventions to address risk factors: Scheduled appointment with PCP- For today at 1340;  Goal: Demonstrates behaviors to self-manage improving airway clearance;  Goal: Demonstrates self-management skills to continue daily water and nutritional needs;               Goal: Demonstrates no signs of red flags or other post COVID complications  · No IS device from hospital at [place], so encouraged every 1-2 hour deep breathing exercises. · No more bedrest during the day, but up in chair;  · Keep oxygen on at all times;  · Gaining back her appetite slowly:  Beef stew with rice for supper, craving grape juice;  · Encouraged extra fluids - goal to reach 48 ozs today    [facility] follow up appointment(s):   Future Appointments   Date Time Provider Jimy Wilson   11/4/2021  1:40 PM Margo Grove NP [facility]     Non-[facility] follow up appointment(s): N/A    CTN provided contact information for future needs. Plan for follow-up call in 1-2 days based on severity of symptoms and risk factors. Plan for next call: symptom management-review of any changes in symptoms and self management-encouraged pulmonary toilet and continuous oxygen use;     Goals Addressed                 This Visit's Progress       General     Demonstrates self-management skills to continue daily water and nutritional needs; On track       Pneumonia     Demonstrates no signs of red flags or other post COVID complications   On track       Post Hospitalization     Demonstrates behaviors to self-manage improving airway clearance;    Not on track

## 2021-10-21 ENCOUNTER — PATIENT OUTREACH (OUTPATIENT)
Dept: OTHER | Age: 56
End: 2021-10-21

## 2021-10-21 NOTE — PROGRESS NOTES
Care Manager contacted the patient for follow up outreach x 3 today, no response, left VM messages with this CM's contact information, asking for return call. Await follow up from patient. CM had wanted to FU with patient after her PCP visit with a new provider group yesterday. Unable to reach patient.

## 2021-10-22 ENCOUNTER — PATIENT OUTREACH (OUTPATIENT)
Dept: OTHER | Age: 56
End: 2021-10-22

## 2021-10-22 NOTE — PROGRESS NOTES
Incoming call from patient who left message for this CM during the early morning hours. Reports her circadian rhythm has pushed her back to night time awake hours and day time sleeping. So she was up during the night being active at home. Reports she walked her dog outside, to the neighbor's mailbox, and back. Continues her deep breathing exercises on scheduled. Reports her virtual visit with her new PCP went well. Was able to receive a Pulmonary referral order to see [facility], but no appt scheduled as yet. Continues with fatigue, however, also much more active. Reports shortness of breath continues, but improving with greater activity. Didn't report O2 Sats but recovers quickly with rest on her 2L NC O2. Also reports she is attempting to wean herself for short periods from her Oxygen. But realized she still cannot go long without her oxygen. Verbalized some concerns she has about new onset memory issues, like forgetting things or losing her train of thought. She remembered her Dad had dementia/Alzheimers, as well as her grandmother. Asked if this will get better over time. Reports she will text this CM when she gets up later in the day today. CM will watch for FU call or text.

## 2021-11-03 ENCOUNTER — PATIENT OUTREACH (OUTPATIENT)
Dept: OTHER | Age: 56
End: 2021-11-03

## 2021-11-03 NOTE — PROGRESS NOTES
Care Manager contacted the patient for follow up outreach, no response, left a VM message with this CM's contact information, asking for return call. Await follow up from patient.

## 2021-11-30 ENCOUNTER — PATIENT OUTREACH (OUTPATIENT)
Dept: OTHER | Age: 56
End: 2021-11-30

## 2021-11-30 NOTE — PROGRESS NOTES
Patient resolved from Transition of Care episode on 11/26/21  Discussed COVID-19 related testing which was available at this time. Test results were positive. Patient informed of results, if available? yes     Patient/family has been provided the following resources and education related to COVID-19:                         Signs, symptoms and red flags related to COVID-19            CDC exposure and quarantine guidelines            [employer] exposure contact - [phone]            Contact for their local Department of Health                 Patient currently reports that the following symptoms have improved:  Resolved. No further outreach scheduled with this CTN/ACM/LPN/HC. Episode of Care resolved. Patient has this CTN/ACM/LPN/HC contact information if future needs arise.

## 2021-12-16 ENCOUNTER — HOSPITAL ENCOUNTER (OUTPATIENT)
Dept: GENERAL RADIOLOGY | Age: 56
Discharge: HOME | End: 2021-12-16
Payer: COMMERCIAL

## 2021-12-16 DIAGNOSIS — J11.00 INFLUENZA AND PNEUMONIA: ICD-10-CM

## 2021-12-16 PROCEDURE — 71046 X-RAY EXAM CHEST 2 VIEWS: CPT

## 2022-03-18 PROBLEM — R78.81 BACTEREMIA DUE TO GRAM-POSITIVE BACTERIA: Status: ACTIVE | Noted: 2021-10-15

## 2022-03-18 PROBLEM — T85.9XXA: Status: ACTIVE | Noted: 2019-10-03

## 2022-03-18 PROBLEM — J96.01 ACUTE RESPIRATORY FAILURE WITH HYPOXIA (HCC): Status: ACTIVE | Noted: 2021-10-13

## 2022-03-19 PROBLEM — U07.1 PNEUMONIA DUE TO COVID-19 VIRUS: Status: ACTIVE | Noted: 2021-10-13

## 2022-03-19 PROBLEM — Z90.01 HISTORY OF ENUCLEATION OF LEFT EYEBALL: Status: ACTIVE | Noted: 2019-10-03

## 2022-03-19 PROBLEM — G93.41 ACUTE METABOLIC ENCEPHALOPATHY: Status: ACTIVE | Noted: 2021-10-13

## 2022-03-19 PROBLEM — E87.6 HYPOKALEMIA: Status: ACTIVE | Noted: 2021-10-14

## 2022-03-19 PROBLEM — J12.82 PNEUMONIA DUE TO COVID-19 VIRUS: Status: ACTIVE | Noted: 2021-10-13

## 2022-03-19 PROBLEM — H11.232 SYMBLEPHARON OF LEFT EYE: Status: ACTIVE | Noted: 2019-10-03

## 2022-03-19 PROBLEM — J18.9 MULTIFOCAL PNEUMONIA: Status: ACTIVE | Noted: 2021-10-13

## 2022-05-11 ENCOUNTER — HOSPITAL ENCOUNTER (OUTPATIENT)
Dept: PHYSICAL THERAPY | Age: 57
End: 2022-05-11
Attending: NURSE PRACTITIONER

## 2022-07-13 ENCOUNTER — NURSE TRIAGE (OUTPATIENT)
Dept: OTHER | Facility: CLINIC | Age: 57
End: 2022-07-13

## 2022-07-13 NOTE — TELEPHONE ENCOUNTER
Received call from Talisha Norton at [facility] with Red Flag Complaint. Subjective: Caller states \"right leg pain off and on\"     Current Symptoms: denies    Onset: 1 month ago; gradual, worsening    Associated Symptoms: NA    Pain Severity: 0/10; N/A; none    Temperature: denies    What has been tried:motrin    LMP: NA Pregnant: No    Recommended disposition: See in Office Today or Tomorrow    Care advice provided, patient verbalizes understanding; denies any other questions or concerns; instructed to call back for any new or worsening symptoms. Patient/Caller agrees with recommended disposition; writer provided warm transfer to [employer] at [facility] for appointment scheduling     Attention Provider: Thank you for allowing me to participate in the care of your patient. The patient was connected to triage in response to information provided to the ECC/PSC. Please do not respond through this encounter as the response is not directed to a shared pool.           Reason for Disposition   Numbness in a leg or foot (i.e., loss of sensation)    Protocols used: HIP PAIN-ADULT-[place]
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
## 2022-07-14 ENCOUNTER — OFFICE VISIT (OUTPATIENT)
Dept: FAMILY MEDICINE CLINIC | Facility: CLINIC | Age: 57
End: 2022-07-14
Payer: COMMERCIAL

## 2022-07-14 VITALS
SYSTOLIC BLOOD PRESSURE: 121 MMHG | WEIGHT: 127 LBS | OXYGEN SATURATION: 99 % | HEIGHT: 63 IN | DIASTOLIC BLOOD PRESSURE: 78 MMHG | HEART RATE: 86 BPM | BODY MASS INDEX: 22.5 KG/M2

## 2022-07-14 DIAGNOSIS — M54.16 LUMBAR BACK PAIN WITH RADICULOPATHY AFFECTING RIGHT LOWER EXTREMITY: Primary | ICD-10-CM

## 2022-07-14 PROCEDURE — 99213 OFFICE O/P EST LOW 20 MIN: CPT | Performed by: NURSE PRACTITIONER

## 2022-07-14 RX ORDER — GABAPENTIN 100 MG/1
CAPSULE ORAL
COMMUNITY
Start: 2022-05-23

## 2022-07-14 RX ORDER — PREDNISONE 10 MG/1
TABLET ORAL
Qty: 21 EACH | Refills: 0 | Status: SHIPPED | OUTPATIENT
Start: 2022-07-14

## 2022-07-14 RX ORDER — TRAMADOL HYDROCHLORIDE 50 MG/1
TABLET ORAL
COMMUNITY
Start: 2022-06-22

## 2022-07-14 ASSESSMENT — PATIENT HEALTH QUESTIONNAIRE - PHQ9
2. FEELING DOWN, DEPRESSED OR HOPELESS: 0
SUM OF ALL RESPONSES TO PHQ QUESTIONS 1-9: 0
SUM OF ALL RESPONSES TO PHQ9 QUESTIONS 1 & 2: 0
SUM OF ALL RESPONSES TO PHQ QUESTIONS 1-9: 0
1. LITTLE INTEREST OR PLEASURE IN DOING THINGS: 0

## 2022-07-14 ASSESSMENT — ENCOUNTER SYMPTOMS
SHORTNESS OF BREATH: 0
BACK PAIN: 1

## 2022-07-18 ENCOUNTER — HOSPITAL ENCOUNTER (OUTPATIENT)
Dept: GENERAL RADIOLOGY | Age: 57
Discharge: HOME | End: 2022-07-21
Payer: COMMERCIAL

## 2022-07-18 ENCOUNTER — APPOINTMENT (OUTPATIENT)
Dept: GENERAL RADIOLOGY | Age: 57
End: 2022-07-18
Payer: COMMERCIAL

## 2022-07-18 DIAGNOSIS — M54.16 LUMBAR BACK PAIN WITH RADICULOPATHY AFFECTING RIGHT LOWER EXTREMITY: ICD-10-CM

## 2022-07-18 DIAGNOSIS — M25.561 ACUTE PAIN OF RIGHT KNEE: ICD-10-CM

## 2022-07-18 DIAGNOSIS — M25.562 ACUTE PAIN OF LEFT KNEE: ICD-10-CM

## 2022-07-18 PROCEDURE — 72100 X-RAY EXAM L-S SPINE 2/3 VWS: CPT

## 2022-07-18 PROCEDURE — 73562 X-RAY EXAM OF KNEE 3: CPT

## 2022-08-03 ENCOUNTER — OFFICE VISIT (OUTPATIENT)
Dept: ORTHOPEDIC SURGERY | Age: 57
End: 2022-08-03
Payer: COMMERCIAL

## 2022-08-03 DIAGNOSIS — M54.16 LUMBAR RADICULOPATHY: ICD-10-CM

## 2022-08-03 DIAGNOSIS — M43.16 SPONDYLOLISTHESIS OF LUMBAR REGION: Primary | ICD-10-CM

## 2022-08-03 DIAGNOSIS — M51.36 LUMBAR DEGENERATIVE DISC DISEASE: ICD-10-CM

## 2022-08-03 PROCEDURE — 99204 OFFICE O/P NEW MOD 45 MIN: CPT | Performed by: NURSE PRACTITIONER

## 2022-08-03 RX ORDER — DICLOFENAC POTASSIUM 50 MG/1
50 TABLET, FILM COATED ORAL 3 TIMES DAILY PRN
Qty: 90 TABLET | Refills: 0 | Status: SHIPPED | OUTPATIENT
Start: 2022-08-03 | End: 2022-10-13 | Stop reason: ALTCHOICE
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
## 2022-08-25 ENCOUNTER — OFFICE VISIT (OUTPATIENT)
Dept: ORTHOPEDIC SURGERY | Age: 57
End: 2022-08-25
Payer: COMMERCIAL

## 2022-08-25 DIAGNOSIS — M25.512 ACUTE PAIN OF LEFT SHOULDER: ICD-10-CM

## 2022-08-25 DIAGNOSIS — M54.16 LUMBAR RADICULOPATHY: Primary | ICD-10-CM

## 2022-08-25 DIAGNOSIS — M48.061 LUMBAR FORAMINAL STENOSIS: ICD-10-CM

## 2022-08-25 DIAGNOSIS — M43.16 SPONDYLOLISTHESIS OF LUMBAR REGION: ICD-10-CM

## 2022-08-25 PROCEDURE — 99214 OFFICE O/P EST MOD 30 MIN: CPT | Performed by: NURSE PRACTITIONER
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
## 2022-08-26 ENCOUNTER — OFFICE VISIT (OUTPATIENT)
Dept: ORTHOPEDIC SURGERY | Age: 57
End: 2022-08-26
Payer: COMMERCIAL

## 2022-08-26 DIAGNOSIS — M75.02 ADHESIVE CAPSULITIS OF LEFT SHOULDER: Primary | ICD-10-CM

## 2022-08-26 DIAGNOSIS — M25.512 LEFT SHOULDER PAIN, UNSPECIFIED CHRONICITY: ICD-10-CM

## 2022-08-26 PROCEDURE — 20610 DRAIN/INJ JOINT/BURSA W/O US: CPT | Performed by: PHYSICIAN ASSISTANT

## 2022-08-26 PROCEDURE — 99204 OFFICE O/P NEW MOD 45 MIN: CPT | Performed by: PHYSICIAN ASSISTANT

## 2022-08-26 RX ORDER — TRIAMCINOLONE ACETONIDE 40 MG/ML
80 INJECTION, SUSPENSION INTRA-ARTICULAR; INTRAMUSCULAR ONCE
Status: COMPLETED | OUTPATIENT
Start: 2022-08-26 | End: 2022-08-26

## 2022-08-26 RX ORDER — ONDANSETRON 8 MG/1
TABLET, ORALLY DISINTEGRATING ORAL
COMMUNITY
Start: 2022-08-05 | End: 2022-10-13

## 2022-08-26 RX ORDER — NALOXONE HYDROCHLORIDE 4 MG/.1ML
SPRAY NASAL
COMMUNITY
Start: 2022-08-05

## 2022-08-26 RX ADMIN — TRIAMCINOLONE ACETONIDE 80 MG: 40 INJECTION, SUSPENSION INTRA-ARTICULAR; INTRAMUSCULAR at 08:52
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
## 2022-08-29 ENCOUNTER — TELEPHONE (OUTPATIENT)
Dept: ORTHOPEDIC SURGERY | Age: 57
End: 2022-08-29

## 2022-08-29 DIAGNOSIS — M48.061 LUMBAR FORAMINAL STENOSIS: ICD-10-CM

## 2022-08-29 DIAGNOSIS — M43.16 SPONDYLOLISTHESIS OF LUMBAR REGION: Primary | ICD-10-CM

## 2022-08-29 RX ORDER — TRAMADOL HYDROCHLORIDE 50 MG/1
50 TABLET ORAL EVERY 8 HOURS PRN
Qty: 15 TABLET | Refills: 0 | Status: SHIPPED | OUTPATIENT
Start: 2022-08-29 | End: 2022-09-03

## 2022-08-29 NOTE — TELEPHONE ENCOUNTER
She is currently taking the Gabapentin 3 times per day and the other one is Diclofenac 3 times per day. Please call.

## 2022-08-29 NOTE — TELEPHONE ENCOUNTER
She is seeing [facility] in Sept and is getting an inj Thursday. She is asking for something for pain to help her through her work day. She works four 12 hour shifts and it is becoming more difficult.  She uses the pharmacy through [facility]

## 2022-08-29 NOTE — TELEPHONE ENCOUNTER
Lvm for patient to see what she is currently taking and how and also if she is requesting medication while she is at work?

## 2022-09-01 ENCOUNTER — TELEPHONE (OUTPATIENT)
Dept: ORTHOPEDIC SURGERY | Age: 57
End: 2022-09-01

## 2022-09-02 ENCOUNTER — TELEPHONE (OUTPATIENT)
Dept: ORTHOPEDIC SURGERY | Age: 57
End: 2022-09-02

## 2022-09-08 ENCOUNTER — OFFICE VISIT (OUTPATIENT)
Dept: ORTHOPEDIC SURGERY | Age: 57
End: 2022-09-08
Payer: COMMERCIAL

## 2022-09-08 DIAGNOSIS — M48.061 LUMBAR FORAMINAL STENOSIS: ICD-10-CM

## 2022-09-08 DIAGNOSIS — M43.16 SPONDYLOLISTHESIS OF LUMBAR REGION: Primary | ICD-10-CM

## 2022-09-08 PROCEDURE — 64483 NJX AA&/STRD TFRM EPI L/S 1: CPT | Performed by: PHYSICAL MEDICINE & REHABILITATION

## 2022-09-08 RX ORDER — TRIAMCINOLONE ACETONIDE 40 MG/ML
40 INJECTION, SUSPENSION INTRA-ARTICULAR; INTRAMUSCULAR ONCE
Status: COMPLETED | OUTPATIENT
Start: 2022-09-08 | End: 2022-09-08

## 2022-09-08 RX ADMIN — TRIAMCINOLONE ACETONIDE 40 MG: 40 INJECTION, SUSPENSION INTRA-ARTICULAR; INTRAMUSCULAR at 11:02

## 2022-09-08 NOTE — PROGRESS NOTES
Lumbar foraminal stenosis  M48.061 FL NERVE BLOCK LUMBOSACRAL 1ST     triamcinolone acetonide (KENALOG-40) injection 40 mg         Deo Smith MD  09/08/22

## 2022-09-22 ENCOUNTER — OFFICE VISIT (OUTPATIENT)
Dept: ORTHOPEDIC SURGERY | Age: 57
End: 2022-09-22
Payer: COMMERCIAL

## 2022-09-22 DIAGNOSIS — M43.16 SPONDYLOLISTHESIS OF LUMBAR REGION: Primary | ICD-10-CM

## 2022-09-22 DIAGNOSIS — M48.061 LUMBAR FORAMINAL STENOSIS: ICD-10-CM

## 2022-09-22 DIAGNOSIS — M54.16 LUMBAR RADICULOPATHY: ICD-10-CM

## 2022-09-22 PROCEDURE — 99214 OFFICE O/P EST MOD 30 MIN: CPT | Performed by: ORTHOPAEDIC SURGERY

## 2022-09-22 NOTE — PATIENT INSTRUCTIONS
Discharge Instructions - Spine Surgery    Wound Care and Showering  Your wound will typically be covered with a clear mesh and glue, which is waterproof sufficient for showering but not soaking in a bath. If there is some drainage or bleeding from under the glue, the area should be covered with gauze and secured with tape or Tegaderm (purchased at a pharmacy) until the drainage stops. Tegaderm is preferable since it is waterproof and can be worn in the shower. Once the drainage stops, the outer gauze and Tegaderm dressing can be removed, while leaving the glue layer in place for 10-14 days. Hair washing is permissible while in the shower. No tub baths, hot tubs or whirlpools until seen in the office. Once the wound is healed, the glue can be removed by dissolving with a triple-antibiotic or we can remove it at your first post operative visit. .       If any of the following should occur, please call the office:  Fevers greater than 101 degrees  Increased redness or large amount of swelling around incision      Exercise  You have unlimited walking and stair climbing privileges. Walking outside or walking on a treadmill without an incline is also allowed. Do NOT lift anything weighing greater than 10-15 lbs. Especially try to avoid lifting or reaching above your head. Sleeping  You may sleep in any comfortable position. Many patients find comfort sleeping in a recliner chair. It is normal to have difficulty sleeping for the first several weeks following your surgery. We recommend trying Benadryl, Melatonin, or Tylenol PM for help sleeping. All are over-the-counter and can be found in drugstores. Eating  Because of the tubes in your throat while asleep during surgery, it is normal to have a sore throat and some difficulty swallowing solid foods after your surgery. This may persist for several weeks. Eating soft foods like yogurt, macaroni and mashed potatoes seem to help.     Pain  If you feel you need pain medicine, you may take regular or extra-strength Tylenol. As long as your surgery was not a fusion (i.e. with screws) you can also take an anti-inflammatory medication such as Advil, Aleve, or Motrin. However, if your surgery did involve a fusion, NSAIDS are not recommended for the first 8 weeks because it delays bone growth. Do not resume taking Fosamax for 8 weeks after your fusion surgery. To help alleviate persistent soreness around the incision and muscles, apply ice or warm moist compresses. Driving  You may NOT drive a car until told otherwise by your physician. You may be a passenger for short distances (about 20-30 minutes.) If you must take a longer trip, be sure to make several pit stops so that you can walk and stretch your legs. Reclining in the passenger seat seems to be the most comfortable position for most patients. In some states, it is illegal to drive a car while wearing a neck brace. Follow-Up Appointments  When you are discharged from the hospital, a follow up appointment will be made for 2-3 weeks from your surgery date. Either check [employer] or call [phone] to confirm your follow-up appointment.

## 2022-09-22 NOTE — PROGRESS NOTES
not taking: Reported on 8/3/2022), Disp: , Rfl:     Allergies:  No Known Allergies      Physical Exam:     This is a well developed well nourished female adult. Mood and affect are appropriate. Oriented to person, place, and time. Chest is clear to auscultation. Heart is regular rate and rhythm. The patient ambulates with an antalgic and crouched gait. Sensory testing reveals diminished light touch sensation in the  right L4 dermatome including the lateral thigh, anterior knee and medial shin. .    Reflexes   Right Left   Quadriceps (L4) 2 2   Achilles (S1) 2 2     Ankle jerk is negative for clonus    Strength testing in the lower extremity reveals the following based on the 5 point grading scale:     HF (L2) H Ab (L5) KE (L3/4) ADF (L4) EHL (L5) A Ev (S1) APF (S1)   Right 5 5 5 5 5 5 5   Left 5 5 5 5 5 5 5        Radiographic Studies:     X-rays including AP and lateral views of the lumbar spine were reviewed and interpreted: She has spondylolisthesis at L3-L4 and L4-L5 with significant loss of disc height at L4-L5      MRI of the lumbar spine images were reviewed and interpreted: She has facet arthropathy and degenerative disc changes resulting in spondylolisthesis at L3-L4 and L4-L5 with severe resulting right L4 foraminal impingement    Diagnosis:      ICD-10-CM    1. Spondylolisthesis of lumbar region  M43.16       2. Lumbar foraminal stenosis  M48.061       3. Lumbar radiculopathy  M54.16           Assessment/Plan: This patient's clinical history and physical exam is consistent with right L4 radiculopathy. The imaging studies are concordant with the patient's symptoms. Conservative efforts have been reasonably exhausted and the patient feels like she cannot go on with the symptoms as they are.  We have previously discussed surgical options and now they would like to proceed with surgical scheduling.    -Lumbar TLIF: We discussed the details of surgery including a midline incision in over the low back followed by dissection to the area of stenosis. The nerves would be freed up by trimming any impinging structures including ligaments and bone. Then any segments that are deemed to be unstable will be fused together with cadaver bone and screws and rods will supplement the fusion. A drain may be inserted and the wound would be closed with suture and covered with sterile dressings. The patient would expect to stay in the hospital 1-2 days or until she can get about safely with minimal assistance. A short stay in a rehabilitation facility could also be considered depending on how quickly she recovers. Follow-up would be scheduled for 2-3 weeks and she would have restrictions including no driving, and no lifting greater than 15 lbs until follow up with me. She was encouraged to walk as much as possible before and after the operation to facilitate an expeditious recovery. We also discussed the potential risks of the surgery including, but not limited to infection, spinal fluid leak and potential headaches requiring her to remain supine post-operatively; injury to the cauda equina or peripheral nerve root resulting in weakness, numbness, or very rarely bowel or bladder dysfunction; persistent back or leg symptoms, recurrence of stenosis or the development of instability or hardware failure possibly needing additional surgery;  blood loss needing transfusion; postoperative hematoma; and the risks of anesthesia. The patient voiced an understanding of these issues as outlined. The procedure that may prove to be beneficial here is a L3-5 laminectomy and fusion with allograft, and instrumentation, transforaminal lumbar interbody fusion.            Electronically Signed By Juan Miguel Milton MD     10:44 AM

## 2022-09-23 ENCOUNTER — TELEPHONE (OUTPATIENT)
Dept: ORTHOPEDIC SURGERY | Age: 57
End: 2022-09-23

## 2022-09-23 DIAGNOSIS — M43.16 SPONDYLOLISTHESIS OF LUMBAR REGION: Primary | ICD-10-CM

## 2022-09-23 NOTE — TELEPHONE ENCOUNTER
Sent prescription request to Dr. Jefferson Naranjo to authorize and send to patient's pharmacy on file

## 2022-09-23 NOTE — TELEPHONE ENCOUNTER
She was seen yesterday and set up surgery. She is wondering if she can get a refill of her Tramadol sent to the pharmacy on file.  It was prescribed by Bowen booker.

## 2022-09-24 RX ORDER — TRAMADOL HYDROCHLORIDE 50 MG/1
50 TABLET ORAL EVERY 6 HOURS PRN
Qty: 28 TABLET | Refills: 0 | Status: SHIPPED | OUTPATIENT
Start: 2022-09-24 | End: 2022-10-01

## 2022-09-26 ENCOUNTER — TELEPHONE (OUTPATIENT)
Dept: ORTHOPEDIC SURGERY | Age: 57
End: 2022-09-26

## 2022-09-26 DIAGNOSIS — M48.061 LUMBAR FORAMINAL STENOSIS: ICD-10-CM

## 2022-09-26 DIAGNOSIS — M54.16 LUMBAR RADICULOPATHY: ICD-10-CM

## 2022-09-26 DIAGNOSIS — M43.16 SPONDYLOLISTHESIS OF LUMBAR REGION: Primary | ICD-10-CM

## 2022-09-26 RX ORDER — HYDROCODONE BITARTRATE AND ACETAMINOPHEN 5; 325 MG/1; MG/1
1 TABLET ORAL EVERY 6 HOURS PRN
Qty: 28 TABLET | Refills: 0 | Status: SHIPPED | OUTPATIENT
Start: 2022-09-26 | End: 2022-10-03

## 2022-09-26 NOTE — TELEPHONE ENCOUNTER
She called Friday regarding a prescription for her back. Tramadol was sent in but she already has that and it doesn't work. Please call to let her know if there is anything else that can be sent in.

## 2022-09-26 NOTE — TELEPHONE ENCOUNTER
Prescription requested was tramadol. Prescription was sent to patient's pharmacy. She has called back stating she has tramadol prescription and it does not help with the pain.  I have discussed with Dr. Carlita Barclay and have sent a different prescription for him to authorize and send to patient's pharmacy

## 2022-09-28 ENCOUNTER — PREP FOR PROCEDURE (OUTPATIENT)
Dept: ORTHOPEDIC SURGERY | Age: 57
End: 2022-09-28

## 2022-09-28 DIAGNOSIS — M54.16 LUMBAR RADICULOPATHY: ICD-10-CM

## 2022-09-28 DIAGNOSIS — M48.062 LUMBAR STENOSIS WITH NEUROGENIC CLAUDICATION: ICD-10-CM

## 2022-09-28 DIAGNOSIS — M43.16 SPONDYLOLISTHESIS OF LUMBAR REGION: Primary | ICD-10-CM

## 2022-09-29 ENCOUNTER — TELEPHONE (OUTPATIENT)
Dept: ORTHOPEDIC SURGERY | Age: 57
End: 2022-09-29

## 2022-09-30 NOTE — TELEPHONE ENCOUNTER
Spoke with patient and informed her that she could drop off paperwork for intermittent leave from work.  She will list days she has missed and we will cover those days along with any more she misses prior to surgery

## 2022-10-07 ENCOUNTER — TELEPHONE (OUTPATIENT)
Dept: ORTHOPEDIC SURGERY | Age: 57
End: 2022-10-07

## 2022-10-13 ENCOUNTER — OFFICE VISIT (OUTPATIENT)
Dept: FAMILY MEDICINE CLINIC | Facility: CLINIC | Age: 57
End: 2022-10-13
Payer: COMMERCIAL

## 2022-10-13 VITALS
TEMPERATURE: 98.3 F | HEART RATE: 82 BPM | OXYGEN SATURATION: 99 % | WEIGHT: 130.2 LBS | DIASTOLIC BLOOD PRESSURE: 84 MMHG | BODY MASS INDEX: 23.07 KG/M2 | HEIGHT: 63 IN | SYSTOLIC BLOOD PRESSURE: 126 MMHG

## 2022-10-13 DIAGNOSIS — I10 PRIMARY HYPERTENSION: Primary | ICD-10-CM

## 2022-10-13 DIAGNOSIS — R51.9 NEW ONSET OF HEADACHES AFTER AGE 50: ICD-10-CM

## 2022-10-13 DIAGNOSIS — G44.52 NEW DAILY PERSISTENT HEADACHE: ICD-10-CM

## 2022-10-13 DIAGNOSIS — Z79.899 MEDICATION MANAGEMENT: ICD-10-CM

## 2022-10-13 DIAGNOSIS — Z13.220 ENCOUNTER FOR SCREENING FOR LIPID DISORDER: ICD-10-CM

## 2022-10-13 DIAGNOSIS — Z13.29 THYROID DISORDER SCREENING: ICD-10-CM

## 2022-10-13 DIAGNOSIS — D64.9 ANEMIA, UNSPECIFIED TYPE: ICD-10-CM

## 2022-10-13 DIAGNOSIS — F51.04 PSYCHOPHYSIOLOGICAL INSOMNIA: ICD-10-CM

## 2022-10-13 DIAGNOSIS — F32.A ANXIETY AND DEPRESSION: ICD-10-CM

## 2022-10-13 DIAGNOSIS — F41.9 ANXIETY AND DEPRESSION: ICD-10-CM

## 2022-10-13 LAB
ERYTHROCYTE [DISTWIDTH] IN BLOOD BY AUTOMATED COUNT: 15.2 % (ref 11.9–14.6)
HCT VFR BLD AUTO: 35.7 % (ref 35.8–46.3)
HGB BLD-MCNC: 10.3 G/DL (ref 11.7–15.4)
MCH RBC QN AUTO: 27.2 PG (ref 26.1–32.9)
MCHC RBC AUTO-ENTMCNC: 28.9 G/DL (ref 31.4–35)
MCV RBC AUTO: 94.2 FL (ref 82–102)
NRBC # BLD: 0 K/UL (ref 0–0.2)
PLATELET # BLD AUTO: 295 K/UL (ref 150–450)
PMV BLD AUTO: 9.5 FL (ref 9.4–12.3)
RBC # BLD AUTO: 3.79 M/UL (ref 4.05–5.2)
WBC # BLD AUTO: 5.5 K/UL (ref 4.3–11.1)

## 2022-10-13 PROCEDURE — 99214 OFFICE O/P EST MOD 30 MIN: CPT | Performed by: NURSE PRACTITIONER

## 2022-10-13 RX ORDER — BUTALBITAL, ACETAMINOPHEN AND CAFFEINE 50; 325; 40 MG/1; MG/1; MG/1
1 TABLET ORAL 2 TIMES DAILY PRN
Qty: 30 TABLET | Refills: 0 | Status: SHIPPED | OUTPATIENT
Start: 2022-10-13

## 2022-10-13 RX ORDER — TRAZODONE HYDROCHLORIDE 50 MG/1
50 TABLET ORAL
Qty: 30 TABLET | Refills: 0 | Status: SHIPPED | OUTPATIENT
Start: 2022-10-13

## 2022-10-13 ASSESSMENT — ENCOUNTER SYMPTOMS
BACK PAIN: 1
WHEEZING: 0
EYE PAIN: 0
COUGH: 0
SHORTNESS OF BREATH: 0
PHOTOPHOBIA: 0

## 2022-10-13 ASSESSMENT — PATIENT HEALTH QUESTIONNAIRE - PHQ9
1. LITTLE INTEREST OR PLEASURE IN DOING THINGS: 1
SUM OF ALL RESPONSES TO PHQ9 QUESTIONS 1 & 2: 4
10. IF YOU CHECKED OFF ANY PROBLEMS, HOW DIFFICULT HAVE THESE PROBLEMS MADE IT FOR YOU TO DO YOUR WORK, TAKE CARE OF THINGS AT HOME, OR GET ALONG WITH OTHER PEOPLE: 1
3. TROUBLE FALLING OR STAYING ASLEEP: 3
9. THOUGHTS THAT YOU WOULD BE BETTER OFF DEAD, OR OF HURTING YOURSELF: 0
6. FEELING BAD ABOUT YOURSELF - OR THAT YOU ARE A FAILURE OR HAVE LET YOURSELF OR YOUR FAMILY DOWN: 0
2. FEELING DOWN, DEPRESSED OR HOPELESS: 3
SUM OF ALL RESPONSES TO PHQ QUESTIONS 1-9: 11
8. MOVING OR SPEAKING SO SLOWLY THAT OTHER PEOPLE COULD HAVE NOTICED. OR THE OPPOSITE, BEING SO FIGETY OR RESTLESS THAT YOU HAVE BEEN MOVING AROUND A LOT MORE THAN USUAL: 0
7. TROUBLE CONCENTRATING ON THINGS, SUCH AS READING THE NEWSPAPER OR WATCHING TELEVISION: 1
SUM OF ALL RESPONSES TO PHQ QUESTIONS 1-9: 11
SUM OF ALL RESPONSES TO PHQ QUESTIONS 1-9: 11
5. POOR APPETITE OR OVEREATING: 0
4. FEELING TIRED OR HAVING LITTLE ENERGY: 3
SUM OF ALL RESPONSES TO PHQ QUESTIONS 1-9: 11
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
## 2022-10-14 NOTE — RESULT ENCOUNTER NOTE
She remains slightly anemic since October 2021. She may have some iron deficiency. I would like to have her come by for some stool cards to  and submit back to us. Has she had a colonoscopy? If not, I would like to refer her for colonoscopy to investigate further as well. Summer:  Is it possible to add a ferritin to her labs? If not, would like her to come have drawn.

## 2022-10-16 ENCOUNTER — TELEPHONE (OUTPATIENT)
Dept: FAMILY MEDICINE CLINIC | Facility: CLINIC | Age: 57
End: 2022-10-16

## 2022-10-17 ENCOUNTER — TELEPHONE (OUTPATIENT)
Dept: ORTHOPEDIC SURGERY | Age: 57
End: 2022-10-17

## 2022-10-21 RX ORDER — ONDANSETRON 4 MG/1
TABLET, ORALLY DISINTEGRATING ORAL
Qty: 42 TABLET | Refills: 0 | OUTPATIENT
Start: 2022-10-21

## 2022-10-24 ENCOUNTER — TELEPHONE (OUTPATIENT)
Dept: ORTHOPEDIC SURGERY | Age: 57
End: 2022-10-24

## 2022-10-25 NOTE — TELEPHONE ENCOUNTER
Placed note in patient chart excusing her from missing work on the following days  8/1, 8/3, 8/17, 9/5, 9/28, 9/29 and 10/16

## 2022-10-28 ENCOUNTER — TELEPHONE (OUTPATIENT)
Dept: ORTHOPEDIC SURGERY | Age: 57
End: 2022-10-28

## 2022-11-09 ENCOUNTER — HOSPITAL ENCOUNTER (OUTPATIENT)
Dept: PREADMISSION TESTING | Age: 57
Discharge: HOME | End: 2022-11-12
Payer: COMMERCIAL

## 2022-11-09 VITALS
BODY MASS INDEX: 23.46 KG/M2 | TEMPERATURE: 97.7 F | DIASTOLIC BLOOD PRESSURE: 82 MMHG | OXYGEN SATURATION: 98 % | HEART RATE: 84 BPM | HEIGHT: 62 IN | RESPIRATION RATE: 16 BRPM | SYSTOLIC BLOOD PRESSURE: 148 MMHG | WEIGHT: 127.5 LBS

## 2022-11-09 LAB
ANION GAP SERPL CALC-SCNC: 7 MMOL/L (ref 2–11)
APPEARANCE UR: CLEAR
BACTERIA SPEC CULT: ABNORMAL
BASOPHILS # BLD: 0.1 K/UL (ref 0–0.2)
BASOPHILS NFR BLD: 1 % (ref 0–2)
BILIRUB UR QL: NEGATIVE
BUN SERPL-MCNC: 13 MG/DL (ref 6–23)
CALCIUM SERPL-MCNC: 9.2 MG/DL (ref 8.3–10.4)
CHLORIDE SERPL-SCNC: 106 MMOL/L (ref 101–110)
CO2 SERPL-SCNC: 26 MMOL/L (ref 21–32)
COLOR UR: ABNORMAL
CREAT SERPL-MCNC: 0.95 MG/DL (ref 0.6–1)
DIFFERENTIAL METHOD BLD: ABNORMAL
EOSINOPHIL # BLD: 0.1 K/UL (ref 0–0.8)
EOSINOPHIL NFR BLD: 1 % (ref 0.5–7.8)
ERYTHROCYTE [DISTWIDTH] IN BLOOD BY AUTOMATED COUNT: 14.9 % (ref 11.9–14.6)
GLUCOSE SERPL-MCNC: 71 MG/DL (ref 65–100)
GLUCOSE UR STRIP.AUTO-MCNC: NEGATIVE MG/DL
HCT VFR BLD AUTO: 39.1 % (ref 35.8–46.3)
HGB BLD-MCNC: 11.8 G/DL (ref 11.7–15.4)
HGB UR QL STRIP: NEGATIVE
IMM GRANULOCYTES # BLD AUTO: 0 K/UL (ref 0–0.5)
IMM GRANULOCYTES NFR BLD AUTO: 0 % (ref 0–5)
KETONES UR QL STRIP.AUTO: ABNORMAL MG/DL
LEUKOCYTE ESTERASE UR QL STRIP.AUTO: NEGATIVE
LYMPHOCYTES # BLD: 0.9 K/UL (ref 0.5–4.6)
LYMPHOCYTES NFR BLD: 13 % (ref 13–44)
MCH RBC QN AUTO: 27.3 PG (ref 26.1–32.9)
MCHC RBC AUTO-ENTMCNC: 30.2 G/DL (ref 31.4–35)
MCV RBC AUTO: 90.5 FL (ref 82–102)
MONOCYTES # BLD: 0.6 K/UL (ref 0.1–1.3)
MONOCYTES NFR BLD: 8 % (ref 4–12)
NEUTS SEG # BLD: 5.5 K/UL (ref 1.7–8.2)
NEUTS SEG NFR BLD: 77 % (ref 43–78)
NITRITE UR QL STRIP.AUTO: NEGATIVE
NRBC # BLD: 0 K/UL (ref 0–0.2)
PH UR STRIP: 5 [PH] (ref 5–9)
PLATELET # BLD AUTO: 294 K/UL (ref 150–450)
PMV BLD AUTO: 9.4 FL (ref 9.4–12.3)
POTASSIUM SERPL-SCNC: 3.4 MMOL/L (ref 3.5–5.1)
PROT UR STRIP-MCNC: NEGATIVE MG/DL
RBC # BLD AUTO: 4.32 M/UL (ref 4.05–5.2)
SERVICE CMNT-IMP: ABNORMAL
SODIUM SERPL-SCNC: 139 MMOL/L (ref 133–143)
SP GR UR REFRACTOMETRY: 1.02 (ref 1–1.02)
UROBILINOGEN UR QL STRIP.AUTO: 0.2 EU/DL (ref 0.2–1)
WBC # BLD AUTO: 7.1 K/UL (ref 4.3–11.1)

## 2022-11-09 PROCEDURE — 85025 COMPLETE CBC W/AUTO DIFF WBC: CPT

## 2022-11-09 PROCEDURE — 81003 URINALYSIS AUTO W/O SCOPE: CPT

## 2022-11-09 PROCEDURE — 80048 BASIC METABOLIC PNL TOTAL CA: CPT

## 2022-11-09 PROCEDURE — 87641 MR-STAPH DNA AMP PROBE: CPT

## 2022-11-09 ASSESSMENT — PAIN DESCRIPTION - ORIENTATION: ORIENTATION: LOWER;RIGHT

## 2022-11-09 ASSESSMENT — PAIN SCALES - GENERAL: PAINLEVEL_OUTOF10: 5

## 2022-11-09 ASSESSMENT — PAIN DESCRIPTION - DESCRIPTORS: DESCRIPTORS: TINGLING

## 2022-11-09 ASSESSMENT — PAIN DESCRIPTION - LOCATION: LOCATION: BACK;LEG

## 2022-11-09 NOTE — PERIOP NOTE
PLEASE CONTINUE TAKING ALL PRESCRIPTION MEDICATIONS UP TO THE DAY OF SURGERY UNLESS OTHERWISE DIRECTED BELOW. DISCONTINUE all vitamins and supplements 7 days prior to surgery. DISCONTINUE Non-Steriodal Anti-Inflammatory (NSAIDS) such as Advil and Aleve 5 days prior to surgery. Home Medications to take  the day of surgery      Escitalopram (Lexapro)           Home Medications   to Hold   No Vitamins, Supplements or Anti-inflammatories such as Aleve, Ibuprofen, Excedrin, Motrin, or Advil. Comments     JOSEPH-HEX shower the night before surgery and the morning of surgery. On the day before surgery please take Acetaminophen 1000mg in the morning and then again before bed. You may substitute for Tylenol 650 mg. Please do not bring home medications with you on the day of surgery unless otherwise directed by your nurse. If you are instructed to bring home medications, please give them to your nurse as they will be administered by the nursing staff. If you have any questions, please call [facility] [phone] or [address] [place] [address] [phone]. A copy of this note was provided to the patient for reference. How to Use Your Incentive Spirometer       About Your Incentive Spirometer  An incentive spirometer is a device that will expand your lungs by helping you to breathe more deeply and fully. The parts of your incentive spirometer are labeled in Figure 1. Using your incentive spirometer  When you're using your incentive spirometer, make sure to breathe through your mouth. If you breathe through your nose, the incentive spirometer won't work properly. You can hold your nose if you have trouble. DO NOT BLOW INTO THE DEVICE. If you feel dizzy at any time, stop and rest. Try again at a later time. Sit upright in a chair or in bed. Hold the incentive spirometer at eye level. Put the mouthpiece in your mouth and close your lips tightly around it.  Slowly breathe out (exhale) completely. Breathe in (inhale) slowly through your mouth as deeply as you can. As you take the breath, you will see the piston rise inside the large column. While the piston rises, the indicator on the right should move upwards. It should stay in between the 2 arrows (see Figure 1). Try to get the piston as high as you can, while keeping the indicator between the arrows. If the indicator doesn't stay between the arrows, you're breathing either too fast or too slow. When you get it as high as you can, hold your breath for 10 seconds, or as long as possible. While you're holding your breath, the piston will slowly fall to the base of the spirometer. Once the piston reaches the bottom of the spirometer, breathe out slowly through your mouth. Rest for a few seconds. Repeat 10 times. Try to get the piston to the same level with each breath. After each set of 10 breaths, try to cough as coughing will help loosen or clear any mucus in your lungs. Put the marker at the level the piston reached on your incentive spirometer. This will be your goal next time. Repeat these steps every hour that you're awake.   Cover the mouthpiece of the incentive spirometer when you aren't using it

## 2022-11-09 NOTE — PERIOP NOTE
Patient verified name, , and surgery as listed in [facility]. Patient provided medical/health information and PTA medications to the best of their ability. TYPE  CASE: 3  Orders per surgeon: Orders received  Labs per surgeon: CBC with diff, BMP,UA, MRSA/MSSA. Results: pending  Labs per anesthesia protocol: T/S DOS. EKG:  NA     MRSA/MSSA swab collected; pharmacy to review and dose antibiotic as appropriate. Patient provided with and instructed on education handouts including Guide to Surgery, blood transfusions, pain management, and hand hygiene for the family and community, and [facility] brochure. Road to Recovery Spine surgery patient guide given. JOSEPH-HEX  and instructions given per hospital policy. Pt instructed to apply a small sample to a small area of skin prior to using to reduce risk of reaction. Original medication prescription bottles were not visualized during patient appointment. Patient teach back successful and patient demonstrates knowledge of instruction.

## 2022-11-09 NOTE — PERIOP NOTE
All labs reviewed. UA with trace of ketones and MRSA and MSSA positive. All labs routed to surgeons office.

## 2022-11-09 NOTE — PERIOP NOTE
Latest Reference Range & Units 11/9/22 08:43   Sodium 133 - 143 mmol/L 139   Potassium 3.5 - 5.1 mmol/L 3.4 (L)   Chloride 101 - 110 mmol/L 106   CO2 21 - 32 mmol/L 26   BUN,BUNPL 6 - 23 MG/DL 13   Creatinine 0.6 - 1.0 MG/DL 0.95   Anion Gap 2 - 11 mmol/L 7   Est, Glom Filt Rate >60 ml/min/1.73m2 >60   Glucose, Random 65 - 100 mg/dL 71   CALCIUM, SERUM, 853264 8.3 - 10.4 MG/DL 9.2   WBC 4.3 - 11.1 K/uL 7.1   RBC 4.05 - 5.2 M/uL 4.32   Hemoglobin Quant 11.7 - 15.4 g/dL 11.8   Hematocrit 35.8 - 46.3 % 39.1   MCV 82.0 - 102.0 FL 90.5   MCH 26.1 - 32.9 PG 27.3   MCHC 31.4 - 35.0 g/dL 30.2 (L)   MPV 9.4 - 12.3 FL 9.4   RDW 11.9 - 14.6 % 14.9 (H)   Platelet Count 583 - 450 K/uL 294   Absolute Mono # 0.1 - 1.3 K/UL 0.6   Eosinophils % 0.5 - 7.8 % 1   Basophils Absolute 0.0 - 0.2 K/UL 0.1   Differential Type -   AUTOMATED   Seg Neutrophils 43 - 78 % 77   Segs Absolute 1.7 - 8.2 K/UL 5.5   Lymphocytes 13 - 44 % 13   Absolute Lymph # 0.5 - 4.6 K/UL 0.9   Monocytes 4.0 - 12.0 % 8   Absolute Eos # 0.0 - 0.8 K/UL 0.1   Basophils 0.0 - 2.0 % 1   Immature Granulocytes 0.0 - 5.0 % 0   Nucleated Red Blood Cells 0.0 - 0.2 K/uL 0.00   Absolute Immature Granulocyte 0.0 - 0.5 K/UL 0.0   Color, UA -   YELLOW/STRAW   Glucose, UA mg/dL Negative   Bilirubin, Urine NEG   Negative   Ketones, Urine NEG mg/dL TRACE ! Specific Gravity, UA 1.001 - 1.023   1.024 (H)   Blood, Urine NEG   Negative   Protein, UA NEG mg/dL Negative   Urobilinogen, Urine 0.2 - 1.0 EU/dL 0.2   Nitrite, Urine NEG   Negative   Leukocyte Esterase, Urine NEG   Negative   Appearance -   CLEAR   pH, Urine 5.0 - 9.0   5.0   MSSA/MRSA SCREEN BY PCR  Rpt ! Special Requests -   NO SPECIAL REQUESTS   (L): Data is abnormally low  (H): Data is abnormally high  !: Data is abnormal  Rpt: View report in Results Review for more information      Collected: 11/9/22 0843    MRSA target DNA detected, SA target DNA detected.        A positive test result does not necessarily indicate the presence of viable organisms.  It is however, presumptive for the presence of MRSA or SA.

## 2022-11-10 ENCOUNTER — TELEPHONE (OUTPATIENT)
Dept: ORTHOPEDIC SURGERY | Age: 57
End: 2022-11-10

## 2022-11-11 ENCOUNTER — TELEPHONE (OUTPATIENT)
Dept: ORTHOPEDIC SURGERY | Age: 57
End: 2022-11-11

## 2022-11-11 NOTE — TELEPHONE ENCOUNTER
Tried to return call and it went to voicemail. She was not specific with her questions regarding upcoming surgery.  I instructed her to call back and leave a more detailed message so that I could get her questions answered for her

## 2022-11-14 ENCOUNTER — TELEPHONE (OUTPATIENT)
Dept: ORTHOPEDIC SURGERY | Age: 57
End: 2022-11-14

## 2022-11-14 NOTE — TELEPHONE ENCOUNTER
Patient was told could be overnight stay and medication would be sent to her pharmacy for pain before discharge. She asked about disability paper work and I told her that we would look at it and hopefully have it done by the end of the week.

## 2022-11-15 ENCOUNTER — TELEPHONE (OUTPATIENT)
Dept: ORTHOPEDIC SURGERY | Age: 57
End: 2022-11-15

## 2022-11-15 NOTE — PERIOP NOTE
----- Message from Nathanel Apgar, MD sent at 2018  4:22 PM EST -----  INR good. Continue same dosage and recheck INR in 1 month. Left detailed message with arrival time of 0800 on 11/16, requested a call back for confirmation.

## 2022-11-15 NOTE — TELEPHONE ENCOUNTER
Hospital calls the day before surgery with arrival time and where to go.  Left message to inform patient

## 2022-11-16 ENCOUNTER — ANESTHESIA (OUTPATIENT)
Dept: SURGERY | Age: 57
End: 2022-11-16
Payer: COMMERCIAL

## 2022-11-16 ENCOUNTER — HOSPITAL ENCOUNTER (OUTPATIENT)
Age: 57
Setting detail: OBSERVATION
Discharge: HOME | End: 2022-11-18
Attending: ORTHOPAEDIC SURGERY | Admitting: ORTHOPAEDIC SURGERY
Payer: COMMERCIAL

## 2022-11-16 ENCOUNTER — APPOINTMENT (OUTPATIENT)
Dept: GENERAL RADIOLOGY | Age: 57
End: 2022-11-16
Attending: ORTHOPAEDIC SURGERY
Payer: COMMERCIAL

## 2022-11-16 ENCOUNTER — ANESTHESIA EVENT (OUTPATIENT)
Dept: SURGERY | Age: 57
End: 2022-11-16
Payer: COMMERCIAL

## 2022-11-16 DIAGNOSIS — M54.16 LUMBAR RADICULOPATHY: ICD-10-CM

## 2022-11-16 DIAGNOSIS — M48.062 SPINAL STENOSIS, LUMBAR REGION, WITH NEUROGENIC CLAUDICATION: ICD-10-CM

## 2022-11-16 DIAGNOSIS — M43.16 SPONDYLOLISTHESIS OF LUMBAR REGION: ICD-10-CM

## 2022-11-16 LAB
ABO + RH BLD: NORMAL
BLOOD GROUP ANTIBODIES SERPL: NORMAL
SPECIMEN EXP DATE BLD: NORMAL

## 2022-11-16 PROCEDURE — 2720000010 HC SURG SUPPLY STERILE: Performed by: ORTHOPAEDIC SURGERY

## 2022-11-16 PROCEDURE — 6370000000 HC RX 637 (ALT 250 FOR IP): Performed by: ANESTHESIOLOGY

## 2022-11-16 PROCEDURE — 2580000003 HC RX 258: Performed by: ANESTHESIOLOGY

## 2022-11-16 PROCEDURE — 63052 LAM FACETC/FRMT ARTHRD LUM 1: CPT | Performed by: ORTHOPAEDIC SURGERY

## 2022-11-16 PROCEDURE — 3700000000 HC ANESTHESIA ATTENDED CARE: Performed by: ORTHOPAEDIC SURGERY

## 2022-11-16 PROCEDURE — 86900 BLOOD TYPING SEROLOGIC ABO: CPT

## 2022-11-16 PROCEDURE — 2500000003 HC RX 250 WO HCPCS: Performed by: REGISTERED NURSE

## 2022-11-16 PROCEDURE — 22634 ARTHRD CMBN 1NTRSPC EA ADDL: CPT | Performed by: ORTHOPAEDIC SURGERY

## 2022-11-16 PROCEDURE — 2580000003 HC RX 258: Performed by: ORTHOPAEDIC SURGERY

## 2022-11-16 PROCEDURE — 3600000014 HC SURGERY LEVEL 4 ADDTL 15MIN: Performed by: ORTHOPAEDIC SURGERY

## 2022-11-16 PROCEDURE — G0378 HOSPITAL OBSERVATION PER HR: HCPCS

## 2022-11-16 PROCEDURE — 6360000002 HC RX W HCPCS: Performed by: NURSE ANESTHETIST, CERTIFIED REGISTERED

## 2022-11-16 PROCEDURE — 20930 SP BONE ALGRFT MORSEL ADD-ON: CPT | Performed by: ORTHOPAEDIC SURGERY

## 2022-11-16 PROCEDURE — 2709999900 HC NON-CHARGEABLE SUPPLY: Performed by: ORTHOPAEDIC SURGERY

## 2022-11-16 PROCEDURE — 22842 INSERT SPINE FIXATION DEVICE: CPT | Performed by: ORTHOPAEDIC SURGERY

## 2022-11-16 PROCEDURE — 6360000002 HC RX W HCPCS: Performed by: ANESTHESIOLOGY

## 2022-11-16 PROCEDURE — 6370000000 HC RX 637 (ALT 250 FOR IP): Performed by: ORTHOPAEDIC SURGERY

## 2022-11-16 PROCEDURE — 2500000003 HC RX 250 WO HCPCS: Performed by: NURSE ANESTHETIST, CERTIFIED REGISTERED

## 2022-11-16 PROCEDURE — 6360000002 HC RX W HCPCS: Performed by: REGISTERED NURSE

## 2022-11-16 PROCEDURE — 7100000000 HC PACU RECOVERY - FIRST 15 MIN: Performed by: ORTHOPAEDIC SURGERY

## 2022-11-16 PROCEDURE — 3600000004 HC SURGERY LEVEL 4 BASE: Performed by: ORTHOPAEDIC SURGERY

## 2022-11-16 PROCEDURE — 6360000002 HC RX W HCPCS: Performed by: ORTHOPAEDIC SURGERY

## 2022-11-16 PROCEDURE — 72100 X-RAY EXAM L-S SPINE 2/3 VWS: CPT

## 2022-11-16 PROCEDURE — 63053 LAM FACTC/FRMT ARTHRD LUM EA: CPT | Performed by: ORTHOPAEDIC SURGERY

## 2022-11-16 PROCEDURE — 3700000001 HC ADD 15 MINUTES (ANESTHESIA): Performed by: ORTHOPAEDIC SURGERY

## 2022-11-16 PROCEDURE — C1889 IMPLANT/INSERT DEVICE, NOC: HCPCS | Performed by: ORTHOPAEDIC SURGERY

## 2022-11-16 PROCEDURE — C1713 ANCHOR/SCREW BN/BN,TIS/BN: HCPCS | Performed by: ORTHOPAEDIC SURGERY

## 2022-11-16 PROCEDURE — 22633 ARTHRD CMBN 1NTRSPC LUMBAR: CPT | Performed by: ORTHOPAEDIC SURGERY

## 2022-11-16 PROCEDURE — 2500000003 HC RX 250 WO HCPCS: Performed by: ORTHOPAEDIC SURGERY

## 2022-11-16 PROCEDURE — 22853 INSJ BIOMECHANICAL DEVICE: CPT | Performed by: ORTHOPAEDIC SURGERY

## 2022-11-16 PROCEDURE — 7100000001 HC PACU RECOVERY - ADDTL 15 MIN: Performed by: ORTHOPAEDIC SURGERY

## 2022-11-16 DEVICE — VITALLIUM PREBENT AND PRECUT ROD WITH HEX
Type: IMPLANTABLE DEVICE | Site: SPINE LUMBAR | Status: FUNCTIONAL
Brand: XIA 4 5

## 2022-11-16 DEVICE — POLYAXIAL CORTICAL SCREW
Type: IMPLANTABLE DEVICE | Site: SPINE LUMBAR | Status: FUNCTIONAL
Brand: XIA 4.5 SYSTEM -  XIA CT

## 2022-11-16 DEVICE — BLOCKER
Type: IMPLANTABLE DEVICE | Site: SPINE LUMBAR | Status: FUNCTIONAL
Brand: XIA 4.5 SYSTEM -  XIA CT

## 2022-11-16 DEVICE — GRAFT BNE XL: Type: IMPLANTABLE DEVICE | Site: SPINE LUMBAR | Status: FUNCTIONAL

## 2022-11-16 DEVICE — BIO DBM PLUS PUTTY (WITH CANCELLOUS)
Type: IMPLANTABLE DEVICE | Site: SPINE LUMBAR | Status: FUNCTIONAL
Brand: BIO DBM

## 2022-11-16 DEVICE — ALLOGRAFT BNE CHIP 1-4 MM 15 CC CRUSH CANC: Type: IMPLANTABLE DEVICE | Site: SPINE LUMBAR | Status: FUNCTIONAL

## 2022-11-16 DEVICE — SPACER SPNL 15 DEG SM 28X10 MM STRL PROLIFT: Type: IMPLANTABLE DEVICE | Site: SPINE LUMBAR | Status: FUNCTIONAL

## 2022-11-16 RX ORDER — GLYCOPYRROLATE 0.2 MG/ML
INJECTION INTRAMUSCULAR; INTRAVENOUS PRN
Status: DISCONTINUED | OUTPATIENT
Start: 2022-11-16 | End: 2022-11-16 | Stop reason: SDUPTHER

## 2022-11-16 RX ORDER — HYDROMORPHONE HYDROCHLORIDE 2 MG/ML
0.5 INJECTION, SOLUTION INTRAMUSCULAR; INTRAVENOUS; SUBCUTANEOUS EVERY 5 MIN PRN
Status: COMPLETED | OUTPATIENT
Start: 2022-11-16 | End: 2022-11-16

## 2022-11-16 RX ORDER — SODIUM CHLORIDE 0.9 % (FLUSH) 0.9 %
5-40 SYRINGE (ML) INJECTION PRN
Status: DISCONTINUED | OUTPATIENT
Start: 2022-11-16 | End: 2022-11-16 | Stop reason: HOSPADM

## 2022-11-16 RX ORDER — PROPOFOL 10 MG/ML
INJECTION, EMULSION INTRAVENOUS PRN
Status: DISCONTINUED | OUTPATIENT
Start: 2022-11-16 | End: 2022-11-16 | Stop reason: SDUPTHER

## 2022-11-16 RX ORDER — PROCHLORPERAZINE EDISYLATE 5 MG/ML
5 INJECTION INTRAMUSCULAR; INTRAVENOUS
Status: DISCONTINUED | OUTPATIENT
Start: 2022-11-16 | End: 2022-11-16 | Stop reason: HOSPADM

## 2022-11-16 RX ORDER — SODIUM CHLORIDE, SODIUM LACTATE, POTASSIUM CHLORIDE, CALCIUM CHLORIDE 600; 310; 30; 20 MG/100ML; MG/100ML; MG/100ML; MG/100ML
INJECTION, SOLUTION INTRAVENOUS CONTINUOUS
Status: DISCONTINUED | OUTPATIENT
Start: 2022-11-16 | End: 2022-11-16 | Stop reason: HOSPADM

## 2022-11-16 RX ORDER — OXYCODONE HYDROCHLORIDE 5 MG/1
5 TABLET ORAL EVERY 4 HOURS PRN
Status: DISCONTINUED | OUTPATIENT
Start: 2022-11-16 | End: 2022-11-18 | Stop reason: HOSPADM

## 2022-11-16 RX ORDER — HALOPERIDOL 5 MG/ML
1 INJECTION INTRAMUSCULAR
Status: DISCONTINUED | OUTPATIENT
Start: 2022-11-16 | End: 2022-11-16 | Stop reason: HOSPADM

## 2022-11-16 RX ORDER — PHENYLEPHRINE HYDROCHLORIDE 10 MG/ML
INJECTION INTRAVENOUS PRN
Status: DISCONTINUED | OUTPATIENT
Start: 2022-11-16 | End: 2022-11-16 | Stop reason: SDUPTHER

## 2022-11-16 RX ORDER — MORPHINE SULFATE 4 MG/ML
4 INJECTION, SOLUTION INTRAMUSCULAR; INTRAVENOUS
Status: DISCONTINUED | OUTPATIENT
Start: 2022-11-16 | End: 2022-11-18 | Stop reason: HOSPADM

## 2022-11-16 RX ORDER — SCOLOPAMINE TRANSDERMAL SYSTEM 1 MG/1
1 PATCH, EXTENDED RELEASE TRANSDERMAL ONCE
Status: DISCONTINUED | OUTPATIENT
Start: 2022-11-16 | End: 2022-11-16

## 2022-11-16 RX ORDER — DIPHENHYDRAMINE HCL 25 MG
25 CAPSULE ORAL EVERY 6 HOURS PRN
Status: DISCONTINUED | OUTPATIENT
Start: 2022-11-16 | End: 2022-11-18 | Stop reason: HOSPADM

## 2022-11-16 RX ORDER — EPHEDRINE SULFATE/0.9% NACL/PF 50 MG/5 ML
SYRINGE (ML) INTRAVENOUS PRN
Status: DISCONTINUED | OUTPATIENT
Start: 2022-11-16 | End: 2022-11-16 | Stop reason: SDUPTHER

## 2022-11-16 RX ORDER — OXYCODONE HYDROCHLORIDE 5 MG/1
10 TABLET ORAL EVERY 4 HOURS PRN
Status: DISCONTINUED | OUTPATIENT
Start: 2022-11-16 | End: 2022-11-18 | Stop reason: HOSPADM

## 2022-11-16 RX ORDER — OXYCODONE HYDROCHLORIDE 5 MG/1
5 TABLET ORAL
Status: COMPLETED | OUTPATIENT
Start: 2022-11-16 | End: 2022-11-16

## 2022-11-16 RX ORDER — LISINOPRIL 5 MG/1
5 TABLET ORAL DAILY
Status: DISCONTINUED | OUTPATIENT
Start: 2022-11-17 | End: 2022-11-18 | Stop reason: HOSPADM

## 2022-11-16 RX ORDER — FENTANYL CITRATE 50 UG/ML
100 INJECTION, SOLUTION INTRAMUSCULAR; INTRAVENOUS
Status: DISCONTINUED | OUTPATIENT
Start: 2022-11-16 | End: 2022-11-16 | Stop reason: HOSPADM

## 2022-11-16 RX ORDER — ONDANSETRON 2 MG/ML
4 INJECTION INTRAMUSCULAR; INTRAVENOUS EVERY 6 HOURS PRN
Status: DISCONTINUED | OUTPATIENT
Start: 2022-11-16 | End: 2022-11-18 | Stop reason: HOSPADM

## 2022-11-16 RX ORDER — ACETAMINOPHEN 500 MG
1000 TABLET ORAL ONCE
Status: COMPLETED | OUTPATIENT
Start: 2022-11-16 | End: 2022-11-16

## 2022-11-16 RX ORDER — SODIUM CHLORIDE 0.9 % (FLUSH) 0.9 %
5-40 SYRINGE (ML) INJECTION PRN
Status: DISCONTINUED | OUTPATIENT
Start: 2022-11-16 | End: 2022-11-18 | Stop reason: HOSPADM

## 2022-11-16 RX ORDER — SODIUM CHLORIDE 0.9 % (FLUSH) 0.9 %
5-40 SYRINGE (ML) INJECTION EVERY 12 HOURS SCHEDULED
Status: DISCONTINUED | OUTPATIENT
Start: 2022-11-16 | End: 2022-11-16 | Stop reason: HOSPADM

## 2022-11-16 RX ORDER — MORPHINE SULFATE 2 MG/ML
2 INJECTION, SOLUTION INTRAMUSCULAR; INTRAVENOUS
Status: DISCONTINUED | OUTPATIENT
Start: 2022-11-16 | End: 2022-11-18 | Stop reason: HOSPADM

## 2022-11-16 RX ORDER — CYCLOBENZAPRINE HCL 5 MG
10 TABLET ORAL 3 TIMES DAILY PRN
Status: DISCONTINUED | OUTPATIENT
Start: 2022-11-16 | End: 2022-11-18 | Stop reason: HOSPADM

## 2022-11-16 RX ORDER — GABAPENTIN 100 MG/1
100 CAPSULE ORAL 2 TIMES DAILY
Status: DISCONTINUED | OUTPATIENT
Start: 2022-11-16 | End: 2022-11-18 | Stop reason: HOSPADM

## 2022-11-16 RX ORDER — ONDANSETRON 2 MG/ML
INJECTION INTRAMUSCULAR; INTRAVENOUS PRN
Status: DISCONTINUED | OUTPATIENT
Start: 2022-11-16 | End: 2022-11-16 | Stop reason: SDUPTHER

## 2022-11-16 RX ORDER — SODIUM CHLORIDE 9 MG/ML
INJECTION, SOLUTION INTRAVENOUS CONTINUOUS
Status: ACTIVE | OUTPATIENT
Start: 2022-11-16 | End: 2022-11-17

## 2022-11-16 RX ORDER — HYDROMORPHONE HCL 110MG/55ML
PATIENT CONTROLLED ANALGESIA SYRINGE INTRAVENOUS PRN
Status: DISCONTINUED | OUTPATIENT
Start: 2022-11-16 | End: 2022-11-16 | Stop reason: SDUPTHER

## 2022-11-16 RX ORDER — ACETAMINOPHEN 325 MG/1
650 TABLET ORAL EVERY 6 HOURS
Status: DISCONTINUED | OUTPATIENT
Start: 2022-11-16 | End: 2022-11-18 | Stop reason: HOSPADM

## 2022-11-16 RX ORDER — SODIUM CHLORIDE 0.9 % (FLUSH) 0.9 %
5-40 SYRINGE (ML) INJECTION EVERY 12 HOURS SCHEDULED
Status: DISCONTINUED | OUTPATIENT
Start: 2022-11-16 | End: 2022-11-18 | Stop reason: HOSPADM

## 2022-11-16 RX ORDER — OXYCODONE HYDROCHLORIDE AND ACETAMINOPHEN 5; 325 MG/1; MG/1
1 TABLET ORAL EVERY 6 HOURS PRN
Qty: 28 TABLET | Refills: 0 | Status: SHIPPED | OUTPATIENT
Start: 2022-11-16 | End: 2022-11-23

## 2022-11-16 RX ORDER — KETAMINE HYDROCHLORIDE 50 MG/ML
INJECTION, SOLUTION, CONCENTRATE INTRAMUSCULAR; INTRAVENOUS PRN
Status: DISCONTINUED | OUTPATIENT
Start: 2022-11-16 | End: 2022-11-16 | Stop reason: SDUPTHER

## 2022-11-16 RX ORDER — LIDOCAINE HYDROCHLORIDE 20 MG/ML
INJECTION, SOLUTION EPIDURAL; INFILTRATION; INTRACAUDAL; PERINEURAL PRN
Status: DISCONTINUED | OUTPATIENT
Start: 2022-11-16 | End: 2022-11-16 | Stop reason: SDUPTHER

## 2022-11-16 RX ORDER — DIPHENHYDRAMINE HYDROCHLORIDE 50 MG/ML
25 INJECTION INTRAMUSCULAR; INTRAVENOUS EVERY 6 HOURS PRN
Status: DISCONTINUED | OUTPATIENT
Start: 2022-11-16 | End: 2022-11-18 | Stop reason: HOSPADM

## 2022-11-16 RX ORDER — SODIUM CHLORIDE 9 MG/ML
INJECTION, SOLUTION INTRAVENOUS PRN
Status: DISCONTINUED | OUTPATIENT
Start: 2022-11-16 | End: 2022-11-16 | Stop reason: HOSPADM

## 2022-11-16 RX ORDER — TRANEXAMIC ACID 650 MG/1
1300 TABLET ORAL ONCE
Status: COMPLETED | OUTPATIENT
Start: 2022-11-16 | End: 2022-11-16

## 2022-11-16 RX ORDER — APREPITANT 40 MG/1
40 CAPSULE ORAL ONCE
Status: COMPLETED | OUTPATIENT
Start: 2022-11-16 | End: 2022-11-16

## 2022-11-16 RX ORDER — NEOSTIGMINE METHYLSULFATE 1 MG/ML
INJECTION, SOLUTION INTRAVENOUS PRN
Status: DISCONTINUED | OUTPATIENT
Start: 2022-11-16 | End: 2022-11-16 | Stop reason: SDUPTHER

## 2022-11-16 RX ORDER — DEXAMETHASONE SODIUM PHOSPHATE 4 MG/ML
INJECTION, SOLUTION INTRA-ARTICULAR; INTRALESIONAL; INTRAMUSCULAR; INTRAVENOUS; SOFT TISSUE PRN
Status: DISCONTINUED | OUTPATIENT
Start: 2022-11-16 | End: 2022-11-16 | Stop reason: SDUPTHER

## 2022-11-16 RX ORDER — PROMETHAZINE HYDROCHLORIDE 25 MG/1
12.5 TABLET ORAL EVERY 6 HOURS PRN
Status: DISCONTINUED | OUTPATIENT
Start: 2022-11-16 | End: 2022-11-18 | Stop reason: HOSPADM

## 2022-11-16 RX ORDER — MIDAZOLAM HYDROCHLORIDE 2 MG/2ML
2 INJECTION, SOLUTION INTRAMUSCULAR; INTRAVENOUS
Status: COMPLETED | OUTPATIENT
Start: 2022-11-16 | End: 2022-11-16

## 2022-11-16 RX ORDER — ROCURONIUM BROMIDE 10 MG/ML
INJECTION, SOLUTION INTRAVENOUS PRN
Status: DISCONTINUED | OUTPATIENT
Start: 2022-11-16 | End: 2022-11-16 | Stop reason: SDUPTHER

## 2022-11-16 RX ORDER — ESCITALOPRAM OXALATE 10 MG/1
10 TABLET ORAL DAILY
Status: DISCONTINUED | OUTPATIENT
Start: 2022-11-17 | End: 2022-11-18 | Stop reason: HOSPADM

## 2022-11-16 RX ORDER — SODIUM CHLORIDE 9 MG/ML
INJECTION, SOLUTION INTRAVENOUS PRN
Status: DISCONTINUED | OUTPATIENT
Start: 2022-11-16 | End: 2022-11-18 | Stop reason: HOSPADM

## 2022-11-16 RX ORDER — LIDOCAINE HYDROCHLORIDE 10 MG/ML
1 INJECTION, SOLUTION INFILTRATION; PERINEURAL
Status: DISCONTINUED | OUTPATIENT
Start: 2022-11-16 | End: 2022-11-16 | Stop reason: HOSPADM

## 2022-11-16 RX ORDER — IPRATROPIUM BROMIDE AND ALBUTEROL SULFATE 2.5; .5 MG/3ML; MG/3ML
1 SOLUTION RESPIRATORY (INHALATION)
Status: DISCONTINUED | OUTPATIENT
Start: 2022-11-16 | End: 2022-11-16 | Stop reason: HOSPADM

## 2022-11-16 RX ADMIN — ACETAMINOPHEN 650 MG: 325 TABLET ORAL at 17:39

## 2022-11-16 RX ADMIN — ONDANSETRON 4 MG: 2 INJECTION INTRAMUSCULAR; INTRAVENOUS at 11:45

## 2022-11-16 RX ADMIN — ACETAMINOPHEN 650 MG: 325 TABLET ORAL at 23:49

## 2022-11-16 RX ADMIN — OXYCODONE 10 MG: 5 TABLET ORAL at 23:49

## 2022-11-16 RX ADMIN — PHENYLEPHRINE HYDROCHLORIDE 200 MCG: 10 INJECTION INTRAVENOUS at 13:00

## 2022-11-16 RX ADMIN — SODIUM CHLORIDE, PRESERVATIVE FREE 10 ML: 5 INJECTION INTRAVENOUS at 20:08

## 2022-11-16 RX ADMIN — HYDROMORPHONE HYDROCHLORIDE 0.5 MG: 2 INJECTION, SOLUTION INTRAMUSCULAR; INTRAVENOUS; SUBCUTANEOUS at 14:59

## 2022-11-16 RX ADMIN — SODIUM CHLORIDE: 9 INJECTION, SOLUTION INTRAVENOUS at 17:52

## 2022-11-16 RX ADMIN — OXYCODONE 5 MG: 5 TABLET ORAL at 15:38

## 2022-11-16 RX ADMIN — ACETAMINOPHEN 1000 MG: 500 TABLET ORAL at 09:01

## 2022-11-16 RX ADMIN — HYDROMORPHONE HYDROCHLORIDE 0.6 MG: 2 INJECTION INTRAMUSCULAR; INTRAVENOUS; SUBCUTANEOUS at 13:27

## 2022-11-16 RX ADMIN — Medication 10 MG: at 12:09

## 2022-11-16 RX ADMIN — PHENYLEPHRINE HYDROCHLORIDE 100 MCG: 10 INJECTION INTRAVENOUS at 11:33

## 2022-11-16 RX ADMIN — PHENYLEPHRINE HYDROCHLORIDE 200 MCG: 10 INJECTION INTRAVENOUS at 12:47

## 2022-11-16 RX ADMIN — MORPHINE SULFATE 4 MG: 4 INJECTION INTRAVENOUS at 21:09

## 2022-11-16 RX ADMIN — Medication 4 MG: at 13:25

## 2022-11-16 RX ADMIN — ROCURONIUM BROMIDE 10 MG: 50 INJECTION, SOLUTION INTRAVENOUS at 12:17

## 2022-11-16 RX ADMIN — HYDROMORPHONE HYDROCHLORIDE 0.5 MG: 2 INJECTION, SOLUTION INTRAMUSCULAR; INTRAVENOUS; SUBCUTANEOUS at 14:32

## 2022-11-16 RX ADMIN — DEXAMETHASONE SODIUM PHOSPHATE 4 MG: 4 INJECTION, SOLUTION INTRAMUSCULAR; INTRAVENOUS at 11:45

## 2022-11-16 RX ADMIN — MIDAZOLAM 2 MG: 1 INJECTION INTRAMUSCULAR; INTRAVENOUS at 09:57

## 2022-11-16 RX ADMIN — MORPHINE SULFATE 2 MG: 2 INJECTION, SOLUTION INTRAMUSCULAR; INTRAVENOUS at 17:39

## 2022-11-16 RX ADMIN — SODIUM CHLORIDE, SODIUM LACTATE, POTASSIUM CHLORIDE, AND CALCIUM CHLORIDE: 600; 310; 30; 20 INJECTION, SOLUTION INTRAVENOUS at 12:45

## 2022-11-16 RX ADMIN — OXYCODONE 10 MG: 5 TABLET ORAL at 19:56

## 2022-11-16 RX ADMIN — GLYCOPYRROLATE 0.6 MG: 0.2 INJECTION, SOLUTION INTRAMUSCULAR; INTRAVENOUS at 13:25

## 2022-11-16 RX ADMIN — PHENYLEPHRINE HYDROCHLORIDE 200 MCG: 10 INJECTION INTRAVENOUS at 13:10

## 2022-11-16 RX ADMIN — Medication 3 AMPULE: at 09:01

## 2022-11-16 RX ADMIN — Medication 2 G: at 11:40

## 2022-11-16 RX ADMIN — APREPITANT 40 MG: 40 CAPSULE ORAL at 09:49

## 2022-11-16 RX ADMIN — SODIUM CHLORIDE, SODIUM LACTATE, POTASSIUM CHLORIDE, AND CALCIUM CHLORIDE: 600; 310; 30; 20 INJECTION, SOLUTION INTRAVENOUS at 08:49

## 2022-11-16 RX ADMIN — PHENYLEPHRINE HYDROCHLORIDE 200 MCG: 10 INJECTION INTRAVENOUS at 13:30

## 2022-11-16 RX ADMIN — ROCURONIUM BROMIDE 10 MG: 50 INJECTION, SOLUTION INTRAVENOUS at 13:00

## 2022-11-16 RX ADMIN — LIDOCAINE HYDROCHLORIDE 100 MG: 20 INJECTION, SOLUTION EPIDURAL; INFILTRATION; INTRACAUDAL; PERINEURAL at 11:24

## 2022-11-16 RX ADMIN — Medication 10 MG: at 12:48

## 2022-11-16 RX ADMIN — TRANEXAMIC ACID 1300 MG: 650 TABLET ORAL at 09:01

## 2022-11-16 RX ADMIN — FENTANYL CITRATE 50 MCG: 50 INJECTION INTRAMUSCULAR; INTRAVENOUS at 11:24

## 2022-11-16 RX ADMIN — PHENYLEPHRINE HYDROCHLORIDE 100 MCG: 10 INJECTION INTRAVENOUS at 11:55

## 2022-11-16 RX ADMIN — ROCURONIUM BROMIDE 40 MG: 50 INJECTION, SOLUTION INTRAVENOUS at 11:24

## 2022-11-16 RX ADMIN — PHENYLEPHRINE HYDROCHLORIDE 100 MCG: 10 INJECTION INTRAVENOUS at 11:57

## 2022-11-16 RX ADMIN — KETAMINE HYDROCHLORIDE 15 MG: 50 INJECTION, SOLUTION INTRAMUSCULAR; INTRAVENOUS at 12:25

## 2022-11-16 RX ADMIN — HYDROMORPHONE HYDROCHLORIDE 0.5 MG: 2 INJECTION, SOLUTION INTRAMUSCULAR; INTRAVENOUS; SUBCUTANEOUS at 16:30

## 2022-11-16 RX ADMIN — PHENYLEPHRINE HYDROCHLORIDE 200 MCG: 10 INJECTION INTRAVENOUS at 12:08

## 2022-11-16 RX ADMIN — PHENYLEPHRINE HYDROCHLORIDE 150 MCG: 10 INJECTION INTRAVENOUS at 12:25

## 2022-11-16 RX ADMIN — PROPOFOL 200 MG: 10 INJECTION, EMULSION INTRAVENOUS at 11:24

## 2022-11-16 RX ADMIN — GABAPENTIN 100 MG: 100 CAPSULE ORAL at 20:07

## 2022-11-16 RX ADMIN — FENTANYL CITRATE 50 MCG: 50 INJECTION INTRAMUSCULAR; INTRAVENOUS at 11:49

## 2022-11-16 RX ADMIN — Medication 10 MG: at 12:16

## 2022-11-16 RX ADMIN — CEFAZOLIN SODIUM 2000 MG: 100 INJECTION, POWDER, LYOPHILIZED, FOR SOLUTION INTRAVENOUS at 17:40

## 2022-11-16 RX ADMIN — HYDROMORPHONE HYDROCHLORIDE 0.5 MG: 2 INJECTION, SOLUTION INTRAMUSCULAR; INTRAVENOUS; SUBCUTANEOUS at 14:45

## 2022-11-16 RX ADMIN — KETAMINE HYDROCHLORIDE 10 MG: 50 INJECTION, SOLUTION INTRAMUSCULAR; INTRAVENOUS at 13:20

## 2022-11-16 RX ADMIN — PHENYLEPHRINE HYDROCHLORIDE 150 MCG: 10 INJECTION INTRAVENOUS at 12:31

## 2022-11-16 RX ADMIN — KETAMINE HYDROCHLORIDE 25 MG: 50 INJECTION, SOLUTION INTRAMUSCULAR; INTRAVENOUS at 11:24

## 2022-11-16 RX ADMIN — PHENYLEPHRINE HYDROCHLORIDE 100 MCG: 10 INJECTION INTRAVENOUS at 12:16

## 2022-11-16 RX ADMIN — CYCLOBENZAPRINE HYDROCHLORIDE 10 MG: 5 TABLET, FILM COATED ORAL at 19:56

## 2022-11-16 RX ADMIN — PHENYLEPHRINE HYDROCHLORIDE 100 MCG: 10 INJECTION INTRAVENOUS at 11:46

## 2022-11-16 ASSESSMENT — PAIN DESCRIPTION - DESCRIPTORS
DESCRIPTORS: ACHING;THROBBING;SORE
DESCRIPTORS: ACHING
DESCRIPTORS: ACHING;THROBBING
DESCRIPTORS: ACHING;SORE
DESCRIPTORS: ACHING;THROBBING

## 2022-11-16 ASSESSMENT — PAIN - FUNCTIONAL ASSESSMENT
PAIN_FUNCTIONAL_ASSESSMENT: PREVENTS OR INTERFERES SOME ACTIVE ACTIVITIES AND ADLS
PAIN_FUNCTIONAL_ASSESSMENT: PREVENTS OR INTERFERES SOME ACTIVE ACTIVITIES AND ADLS
PAIN_FUNCTIONAL_ASSESSMENT: 0-10
PAIN_FUNCTIONAL_ASSESSMENT: PREVENTS OR INTERFERES SOME ACTIVE ACTIVITIES AND ADLS
PAIN_FUNCTIONAL_ASSESSMENT: PREVENTS OR INTERFERES SOME ACTIVE ACTIVITIES AND ADLS
PAIN_FUNCTIONAL_ASSESSMENT: 0-10

## 2022-11-16 ASSESSMENT — PAIN SCALES - GENERAL
PAINLEVEL_OUTOF10: 8
PAINLEVEL_OUTOF10: 7
PAINLEVEL_OUTOF10: 8
PAINLEVEL_OUTOF10: 8
PAINLEVEL_OUTOF10: 5
PAINLEVEL_OUTOF10: 6
PAINLEVEL_OUTOF10: 5
PAINLEVEL_OUTOF10: 0
PAINLEVEL_OUTOF10: 9
PAINLEVEL_OUTOF10: 7
PAINLEVEL_OUTOF10: 8

## 2022-11-16 ASSESSMENT — PAIN DESCRIPTION - FREQUENCY
FREQUENCY: CONTINUOUS
FREQUENCY: INTERMITTENT
FREQUENCY: CONTINUOUS
FREQUENCY: CONTINUOUS

## 2022-11-16 ASSESSMENT — PAIN DESCRIPTION - PAIN TYPE
TYPE: SURGICAL PAIN

## 2022-11-16 ASSESSMENT — PAIN DESCRIPTION - LOCATION
LOCATION: BACK

## 2022-11-16 ASSESSMENT — PAIN DESCRIPTION - ONSET
ONSET: ON-GOING
ONSET: PROGRESSIVE

## 2022-11-16 ASSESSMENT — PAIN DESCRIPTION - ORIENTATION
ORIENTATION: POSTERIOR
ORIENTATION: MID;POSTERIOR
ORIENTATION: POSTERIOR
ORIENTATION: POSTERIOR
ORIENTATION: LOWER

## 2022-11-16 NOTE — PERIOP NOTE
Patient has spoken with [facility] JAY. Consent has been verified, signed and witnessed by this RN. 2 mg of Versed given SIVP per orders. Pulse ox monitor in place & tracing appropriately. Bed in low, locked position with side rails up x 2 and call light in reach. Instructed pt to call with any needs and to remain in bed. Verbalizes understanding.   at bedside.

## 2022-11-16 NOTE — ANESTHESIA PROCEDURE NOTES
Airway  Date/Time: 11/16/2022 11:26 AM  Urgency: elective    Airway not difficult    General Information and Staff    Patient location during procedure: OR  Resident/CRNA: NATALIA Murillo CRNA  Performed: resident/CRNA     Indications and Patient Condition  Indications for airway management: anesthesia  Spontaneous Ventilation: absent  Sedation level: deep  Preoxygenated: yes  Patient position: sniffing  MILS not maintained throughout  Mask difficulty assessment: vent by bag mask    Final Airway Details  Final airway type: endotracheal airway      Successful airway: ETT  Cuffed: yes   Successful intubation technique: direct laryngoscopy  Facilitating devices/methods: intubating stylet  Endotracheal tube insertion site: oral  Blade: Michelle  Blade size: #3  ETT size (mm): 7.0  Cormack-Lehane Classification: grade I - full view of glottis  Placement verified by: chest auscultation and capnometry   Measured from: lips  ETT to lips (cm): 22  Number of attempts at approach: 1  Ventilation between attempts: bag mask  Number of other approaches attempted: 0    no

## 2022-11-16 NOTE — PERIOP NOTE
TRANSFER - OUT REPORT:    Verbal report given to Ike Gama RN on Chucho Maurice  being transferred to [id number] for routine progression of patient care       Report consisted of patients Situation, Background, Assessment and   Recommendations(SBAR). Information from the following report(s) Nurse Handoff Report, Adult Overview, Surgery Report, Intake/Output, MAR, Recent Results, and Cardiac Rhythm sinus Tach  was reviewed with the receiving nurse. Lines:   Peripheral IV 11/16/22 Left;Ventral Hand (Active)   Site Assessment Clean, dry & intact 11/16/22 1500   Line Status Normal saline locked 11/16/22 1500   Phlebitis Assessment No symptoms 11/16/22 1500   Infiltration Assessment 0 11/16/22 1500   Alcohol Cap Used No 11/16/22 0841   Dressing Status Clean, dry & intact 11/16/22 1353   Dressing Type Transparent 11/16/22 1500   Dressing Intervention New 11/16/22 0841        Opportunity for questions and clarification was provided. Patient transported with:   O2 @ 2 liters  Tech    VTE prophylaxis orders have been written for Chucho Maurice. Patient and family given floor number and nurses name. Family updated re: pt status after security code verified.

## 2022-11-16 NOTE — H&P
Chief Complaint:  Radiating back and leg pain     History of Present Illness:       She is here in preop for surgery. The patient has persistent symptoms of lumbar radiculopathy resulting in a significant functional decline as previously described. The symptoms are worse with simple activities of daily living including walking and standing and there has been no lasting benefit from conservative efforts including  a home exercise program for 6 weeks under the direction of [employer]. She has tried lumbar epidural steroid injections, regimens of diclofenac and/or ibuprofen, muscle relaxants, tramadol. At this point  she is ready to proceed with surgical intervention. Allergies:  No Known Allergies        Physical Exam:      This is a well developed well nourished female adult. Mood and affect are appropriate. Oriented to person, place, and time. Chest is clear to auscultation. Heart is regular rate and rhythm. The patient ambulates with an antalgic and crouched gait. Sensory testing reveals diminished light touch sensation in the  right L4 dermatome including the lateral thigh, anterior knee and medial shin. .     No focal motor deficit noted. Radiographic Studies:      X-rays including AP and lateral views of the lumbar spine were reviewed and interpreted: She has spondylolisthesis at L3-L4 and L4-L5 with significant loss of disc height at L4-L5      MRI of the lumbar spine images were reviewed and interpreted: She has facet arthropathy and degenerative disc changes resulting in spondylolisthesis at L3-L4 and L4-L5 with severe resulting right L4 foraminal impingement     Diagnosis:         ICD-10-CM     1. Spondylolisthesis of lumbar region  M43.16         2. Lumbar foraminal stenosis  M48.061         3. Lumbar radiculopathy  M54.16               Assessment/Plan: This patient's clinical history and physical exam is consistent with right L4 radiculopathy.   The imaging studies are concordant with the patient's symptoms. Conservative efforts have been reasonably exhausted and the patient feels like she cannot go on with the symptoms as they are. We have previously discussed surgical options and now they would like to proceed. The procedure planned is a L3-5 laminectomy and fusion with allograft, and instrumentation, transforaminal lumbar interbody fusion.

## 2022-11-16 NOTE — ANESTHESIA POSTPROCEDURE EVALUATION
Department of Anesthesiology  Postprocedure Note    Patient: Rachelle Ernandez  MRN: [id number]  YOB: 1965  Date of evaluation: 11/16/2022      Procedure Summary     Date: 11/16/22 Room / Location: [facility] MAIN OR [address] [place] [address] MAIN OR    Anesthesia Start: 1114 Anesthesia Stop: 1355    Procedure: L3-L5 laminectomy and fusion with allograft and instrumentation; TRANSFORAMINAL lumbar  INTERBODY FUSION (Spine Lumbar) Diagnosis:       Spondylolisthesis of lumbar region      Lumbar radiculopathy      Spinal stenosis, lumbar region, with neurogenic claudication      (Spondylolisthesis of lumbar region [M43.16])      (Lumbar radiculopathy [M54.16])      (Spinal stenosis, lumbar region, with neurogenic claudication [M48.062])    Providers: Tammy Saavedra MD Responsible Provider: Louis Madison MD    Anesthesia Type: General ASA Status: 2          Anesthesia Type: General    Sergey Phase I: Sergey Score: 8    Sergey Phase II: Sergey Score: 9      Anesthesia Post Evaluation    Patient location during evaluation: PACU  Patient participation: complete - patient participated  Level of consciousness: awake  Airway patency: patent  Nausea & Vomiting: no nausea  Complications: no  Cardiovascular status: hemodynamically stable  Respiratory status: acceptable and nonlabored ventilation  Hydration status: stable  Multimodal analgesia pain management approach
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
## 2022-11-16 NOTE — OP NOTE
[address] [address]   [phone]    OPERATIVE REPORT    Patient ID:Sierra Guerra  [id number]  1965  62 y.o. DATE OF SURGERY: 11/16/2022    SURGEON: Chasity Farrar MD    PREOP DIAGNOSIS:     1. Lumbar spondylolisthesis   2. Lumbar stenosis     POSTOP DIAGNOSIS:     1. Lumbar spondylolisthesis   2. Lumbar stenosis     PROCEDURE:  1. Posterolateral and transforaminal lumbar interbody fusion L3-L4 and L4-L5 including laminectomy at L3, L4, and L5 for stenosis (CPT 13245, 54821, 56757, 56197 X 2)  2. Insertion biomechanical device L3-L4 and L4-L5 (CPT 22853 X 2)  3. Instrumentation  L3 through L5 . (CPT D9105944)  4. Allograft (CPT 79486)     ANESTHESIA: General    ESTIMATED BLOOD LOSS:  530 ml    INTRAOPERATIVE COMPLICATIONS: None. POSTOP CONDITION: Stable. IMPLANTS:   Implant Name Type Inv.  Item Serial No.  Lot No. LRB No. Used Action   GRAFT BNE XL - [id number]  GRAFT BNE XL [id number] [employer]OLOG[employer]  N/A 1 Implanted   SERVICE FEE 10ML BIO DBM PTTY W CHIP - [id number]  SERVICE FEE 10ML BIO DBM PTTY W CHIP [id number] [employer] [facility]  N/A 1 Implanted   SERVICE FEE 10ML BIO DBM PTTY W CHIP - [id number]  SERVICE FEE 10ML BIO DBM PTTY W CHIP  [employer] [facility] [id number] N/A 1 Implanted   ALLOGRAFT BNE CHIP 1-4 MM 15 CC CRUSH CAN - [id number]  ALLOGRAFT BNE CHIP 1-4 MM 15 CC CRUSH CANC [id number] [employer] [facility]  N/A 1 Implanted   SPACER SPNL 15 DEG SM 28X10 MM STRL PROLIFT - [id number]  SPACER SPNL 15 DEG SM 28X10 MM STRL PROLIFT  [employer] [facility] [id number] N/A 2 Implanted   BLOCKER SPNL CT GANGA 45 - [id number]  BLOCKER SPNL CT GANGA 45  [employer] [facility]- [id number] N/A 6 Implanted   SCREW SPNL L25MM OD55MM POLYAX ISAMAR CT GANGA - [id number]  SCREW SPNL L25MM OD55MM POLYAX ISAMAR CT GANGA  [place] [employer] HOW- [id number] N/A 1 Implanted   SCREW SPNL L45MM OD5MM POLYAX ISAMAR CT GANGA Stone [id number]  SCREW SPNL L45MM OD5MM POLYAX ISAMAR CT Walda Chasten SPINE HOWM-WD [id number] N/A 3 Implanted   [employer] SPINE PREBENT W HEX VITALIUM [id number] [place] Vela [id number]  ZACH[employer] SPINE PREBENT W HEX VITALIUM [id number] Walda Chasten SPINE HOW-WD [id number] N/A 1 Implanted   ZACH[employer] SPINE PREBENT W HEX VITALIUM [id number] Joshuae Cea  [employer] SPINE PREBENT W HEX VITALIUM [id number] GANGA  [employer] HOWM-WD [id number] N/A 1 Implanted   SCREW SPNL L20MM OD55MM POLYAX ISAMAR CT GANGA - [id number]  SCREW SPNL L20MM OD55MM POLYAX ISAMAR CT Sushanta Chasten SPINE HOW-WD [id number] N/A 2 Implanted       INDICATIONS FOR PROCEDURE: Patient has had low back pain with radiation to the buttocks and lower extremities for an extended period of time. The symptoms and exam findings were felt to be consistent with neurogenic claudication. The preoperative radiographs and other imaging confirmed showed spondylolisthesis and stenosis. Conservative measures have been exhausted as outlined in the H&P. The symptoms progressed to the point where there is difficulty performing any task that requires prolonged standing or walking which interfered with activities of daily living and ability to enjoy life. In the outpatient setting the risks, benefits and potential complications of the above-listed procedure were discussed with her and an informed consent was obtained. DESCRIPTION OF PROCEDURE: After adequate induction of general anesthesia, the patient was positioned prone on the [facility] spinal table. Care was taken to pad all bony prominences. The shoulders and elbows were placed in the 90/90 position. The abdomen was allowed to hang free to decrease intraabdominal and venous pressure. The lumbar area was prepped and draped in the usual sterile fashion. Preoperative antibiotics were administered. A time out was called to confirm appropriate patient, proposed procedure and proposed incision site.  With this confirmation, an incision was created in the midline of the back over the lumbar spinous processes. Dissection was carried down through the skin and subcutaneous tissues to the level of the lumbodorsal fascia. The lumbar dorsal fascia was released off of the spinous processes. The paraspinous musculature was elevated in a subperiosteal fashion in a lateral direction off of the lamina and over the the facet joints to be fused. A curet was slipped beneath the lamina and a cross table flouroscopic image was obtained to identify appropriate spinal level. The L3 through L5 transverse processes were exposed to their lateral tips. All soft tissue was elevated off of the intertransverse membrane laterally in preparation for a fusion bed. With the posterior lateral dissection completed, attention was directed centrally where a Leksell rongeur was used to resect the spinous processes of L3 through L5. The 4 mm janet was then used to thin the lamina to an egg shell like thickness. A triple-0 angled curet was used to elevate the ligamentum flavum off of its origin on the caudal surface of the L5 lamina. The ligamentum flavum was elevated from the thecal sac and a plane was created in the epidural space with a Harviell elevator. A 4 mm Kerrison was used to perform a central laminectomy of L5 and this was carried through L3. The central laminectomy was widened to the medial border of the pedicle at each level. With the central laminectomy completed, a 4 mm Kerrison was used to undercut the lateral recess along the entire length of the laminectomy site. Then using the [facility] elevator to retract the thecal sac and identify each of the nerve roots, partial foraminotomies were performed and each nerve was visualized and decompressed bilaterally at L3, L4, L5. The miah nerve root retractor was used to retrace the thecal sac overlying the L3 - L4  disc space. Care was taken to protect the exiting and descending nerve roots at all times. An annulotomy was then performed with a 15-blade.   A complete discectomy was performed using a series of curets and rongeurs. The interbody sizing system was brought to the field and the sizing paddles were used sequentially to an appropriate fit. The endplates were prepared for fusion using the rasps. A trial interbody device was sized and inserted. Fluoroscopic images were obtained of the trial in both planes. The final interbody implant was the opened and packed with local autograft. Additional local bone graft was placed anteriorly in the interbody space. The biomechanical device was then impacted and rotated appropriately. Again fluoroscopy was ws used to confirm cage positioning. The miah nerve root retractor was used to retrace the thecal sac overlying the L4 - L5  disc space. Care was taken to protect the exiting and descending nerve roots at all times. An annulotomy was then performed with a 15-blade. A complete discectomy was performed using a series of curets and rongeurs. The interbody sizing system was brought to the field and the sizing paddles were used sequentially to an appropriate fit. The endplates were prepared for fusion using the rasps. A trial interbody device was sized and inserted. Fluoroscopic images were obtained of the trial in both planes. The final interbody implant was the opened and packed with local autograft. Additional local bone graft was placed anteriorly in the interbody space. The biomechanical device was then impacted and rotated appropriately. Again fluoroscopy was ws used to confirm cage positioning. The 4 mm janet was used to decorticate the previously exposed transverse processes and lateral aspect of the facet joints and pars intra-articularis. The [place] spinal instrumentation system was brought to the field and using a free hand intersection technique, cortical screws were placed bilaterally from L3 to L5.  The medial border of the pedicle was visualized through the spinal canal to confirm no medial or inferior breech. This was felt to be satisfactory. At this point the Protios soaked allograft was then packed into the lateral gutters beneath the screw heads, along the decorticated transverse processes and lateral facet joints for the posterolateral arthrodesis at L3-4 and L4-5. Appropriately sized rods were then selected and bent into additional lordosis and laid into the pedicle screw heads. The set screws were then applied and tightened to the appropriate torque. C-arm fluoroscopy was brought in and used to obtain images to confirm appropriate hardware level and placement. This was felt to be satisfactory. With this, the wound was liberally irrigated and a hemovac drain was inserted through a separate incision in a subfascial plane. The lumbodorsal fascia was approximated with a # 1 Vicryl suture in an interrupted fashion. The subcutaneous tissue and skin were approximated in a layered fashion. Dermabond was applied. Sterile dressings were applied. The patient tolerated the procedure well and was returned to the postanesthesia care unit in stable condition. At the end of the case, all sponge, needle, and instrument counts were correct.       Magy Flores MD

## 2022-11-17 PROCEDURE — 97530 THERAPEUTIC ACTIVITIES: CPT

## 2022-11-17 PROCEDURE — 96374 THER/PROPH/DIAG INJ IV PUSH: CPT

## 2022-11-17 PROCEDURE — 6360000002 HC RX W HCPCS: Performed by: ORTHOPAEDIC SURGERY

## 2022-11-17 PROCEDURE — 6370000000 HC RX 637 (ALT 250 FOR IP): Performed by: ORTHOPAEDIC SURGERY

## 2022-11-17 PROCEDURE — 2500000003 HC RX 250 WO HCPCS: Performed by: ORTHOPAEDIC SURGERY

## 2022-11-17 PROCEDURE — 96376 TX/PRO/DX INJ SAME DRUG ADON: CPT

## 2022-11-17 PROCEDURE — 97535 SELF CARE MNGMENT TRAINING: CPT

## 2022-11-17 PROCEDURE — 97161 PT EVAL LOW COMPLEX 20 MIN: CPT

## 2022-11-17 PROCEDURE — 2580000003 HC RX 258: Performed by: ORTHOPAEDIC SURGERY

## 2022-11-17 PROCEDURE — G0378 HOSPITAL OBSERVATION PER HR: HCPCS

## 2022-11-17 PROCEDURE — 97165 OT EVAL LOW COMPLEX 30 MIN: CPT

## 2022-11-17 RX ADMIN — OXYCODONE 10 MG: 5 TABLET ORAL at 15:27

## 2022-11-17 RX ADMIN — OXYCODONE 10 MG: 5 TABLET ORAL at 10:59

## 2022-11-17 RX ADMIN — SODIUM CHLORIDE, PRESERVATIVE FREE 10 ML: 5 INJECTION INTRAVENOUS at 09:37

## 2022-11-17 RX ADMIN — OXYCODONE 10 MG: 5 TABLET ORAL at 06:00

## 2022-11-17 RX ADMIN — MORPHINE SULFATE 4 MG: 4 INJECTION INTRAVENOUS at 12:40

## 2022-11-17 RX ADMIN — ACETAMINOPHEN 650 MG: 325 TABLET ORAL at 17:37

## 2022-11-17 RX ADMIN — ACETAMINOPHEN 650 MG: 325 TABLET ORAL at 23:47

## 2022-11-17 RX ADMIN — ACETAMINOPHEN 650 MG: 325 TABLET ORAL at 11:25

## 2022-11-17 RX ADMIN — SODIUM CHLORIDE, PRESERVATIVE FREE 10 ML: 5 INJECTION INTRAVENOUS at 21:44

## 2022-11-17 RX ADMIN — MORPHINE SULFATE 4 MG: 4 INJECTION INTRAVENOUS at 21:39

## 2022-11-17 RX ADMIN — GABAPENTIN 100 MG: 100 CAPSULE ORAL at 21:30

## 2022-11-17 RX ADMIN — MORPHINE SULFATE 4 MG: 4 INJECTION INTRAVENOUS at 17:37

## 2022-11-17 RX ADMIN — MORPHINE SULFATE 4 MG: 4 INJECTION INTRAVENOUS at 03:55

## 2022-11-17 RX ADMIN — CYCLOBENZAPRINE HYDROCHLORIDE 10 MG: 5 TABLET, FILM COATED ORAL at 11:01

## 2022-11-17 RX ADMIN — LISINOPRIL 5 MG: 5 TABLET ORAL at 09:35

## 2022-11-17 RX ADMIN — MORPHINE SULFATE 4 MG: 4 INJECTION INTRAVENOUS at 09:36

## 2022-11-17 RX ADMIN — ESCITALOPRAM OXALATE 10 MG: 10 TABLET ORAL at 09:35

## 2022-11-17 RX ADMIN — ACETAMINOPHEN 650 MG: 325 TABLET ORAL at 06:00

## 2022-11-17 RX ADMIN — CEFAZOLIN SODIUM 2000 MG: 100 INJECTION, POWDER, LYOPHILIZED, FOR SOLUTION INTRAVENOUS at 03:44

## 2022-11-17 RX ADMIN — GABAPENTIN 100 MG: 100 CAPSULE ORAL at 09:36

## 2022-11-17 ASSESSMENT — PAIN DESCRIPTION - LOCATION
LOCATION: BACK

## 2022-11-17 ASSESSMENT — PAIN SCALES - GENERAL
PAINLEVEL_OUTOF10: 0
PAINLEVEL_OUTOF10: 0
PAINLEVEL_OUTOF10: 7
PAINLEVEL_OUTOF10: 9
PAINLEVEL_OUTOF10: 7
PAINLEVEL_OUTOF10: 9
PAINLEVEL_OUTOF10: 8
PAINLEVEL_OUTOF10: 3
PAINLEVEL_OUTOF10: 0
PAINLEVEL_OUTOF10: 8
PAINLEVEL_OUTOF10: 7
PAINLEVEL_OUTOF10: 0
PAINLEVEL_OUTOF10: 3
PAINLEVEL_OUTOF10: 7
PAINLEVEL_OUTOF10: 0
PAINLEVEL_OUTOF10: 7
PAINLEVEL_OUTOF10: 3
PAINLEVEL_OUTOF10: 7

## 2022-11-17 ASSESSMENT — PAIN DESCRIPTION - FREQUENCY
FREQUENCY: INTERMITTENT

## 2022-11-17 ASSESSMENT — PAIN - FUNCTIONAL ASSESSMENT
PAIN_FUNCTIONAL_ASSESSMENT: ACTIVITIES ARE NOT PREVENTED
PAIN_FUNCTIONAL_ASSESSMENT: PREVENTS OR INTERFERES SOME ACTIVE ACTIVITIES AND ADLS
PAIN_FUNCTIONAL_ASSESSMENT: ACTIVITIES ARE NOT PREVENTED

## 2022-11-17 ASSESSMENT — PAIN DESCRIPTION - DESCRIPTORS
DESCRIPTORS: CRUSHING
DESCRIPTORS: ACHING
DESCRIPTORS: ACHING;SORE
DESCRIPTORS: ACHING
DESCRIPTORS: ACHING;THROBBING;SORE;DISCOMFORT
DESCRIPTORS: CRUSHING
DESCRIPTORS: ACHING
DESCRIPTORS: ACHING
DESCRIPTORS: CRUSHING
DESCRIPTORS: ACHING
DESCRIPTORS: ACHING

## 2022-11-17 ASSESSMENT — PAIN DESCRIPTION - ORIENTATION
ORIENTATION: MID
ORIENTATION: LOWER
ORIENTATION: MID
ORIENTATION: POSTERIOR
ORIENTATION: MID
ORIENTATION: LOWER
ORIENTATION: MID
ORIENTATION: POSTERIOR
ORIENTATION: MID

## 2022-11-17 ASSESSMENT — PAIN DESCRIPTION - ONSET
ONSET: ON-GOING
ONSET: PROGRESSIVE
ONSET: ON-GOING

## 2022-11-17 ASSESSMENT — PAIN DESCRIPTION - PAIN TYPE
TYPE: SURGICAL PAIN

## 2022-11-17 NOTE — PROGRESS NOTES
ACUTE PHYSICAL THERAPY GOALS:   (Developed with and agreed upon by patient and/or caregiver.)  Pt will perform bed mobility Mod (I) c inc time but no cueing or use of rails in 7 therapy sessions. Pt will perform sit-to-stand/ stand-to-sit transfers c SB (A) and no LOB/ miss-steps in 7 therapy sessions. Pt will ambulate 500 ft SB (A) without LOB/ miss-steps and breaks as needed in 7 therapy sessions. Pt will perform standing dynamic balance activities with minimal postural sway in 7 therapy sessions. Pt will tolerate multiple sets and reps of BLE exercises in 7 therapy sessions. PHYSICAL THERAPY Initial Assessment, Daily Note, and AM  (Link to Caseload Tracking: PT Visit Days : 1  Acknowledge Orders  Time In/Out  PT Charge Capture  Rehab Caseload Tracker    Terri Oakes is a 62 y.o. female   PRIMARY DIAGNOSIS: Spondylolisthesis of lumbar region  Spondylolisthesis of lumbar region [M43.16]  Lumbar radiculopathy [M54.16]  Spinal stenosis, lumbar region, with neurogenic claudication [M48.062]  S/P lumbar fusion [Z98.1]  Procedure(s) (LRB):  L3-L5 laminectomy and fusion with allograft and instrumentation; TRANSFORAMINAL lumbar  INTERBODY FUSION (N/A)  1 Day Post-Op  Reason for Referral: Difficulty in walking, Not elsewhere classified (R26.2)  Other abnormalities of gait and mobility (R26.89)  Low Back Pain (M54.5)  Observation: Payor: FEDERICO / Plan: VERÓNICA CABALLERO [address] / Product Type: *No Product type* /     ASSESSMENT:     REHAB RECOMMENDATIONS:   Recommendation to date pending progress:  Setting:  Home Health Therapy  Good potential to progress to no needs pending performances     Equipment:    To Be Determined     ASSESSMENT:  Ms. Higinio Goodpasture Is a 62 y.o. female presenting to PT POD 1 s/p L3-L5 laminectomy and fusion.  At time of initial evaluation, pt presents below baseline LOF with deficits in bed mobility, strength, transfers, balance, gait and activity tolerance limiting her overall functional mobility. Today, pt performed all mobility with CG/Min (A), inc time and cueing while requiring additional use of HHA for ambulation of 20'x2. Of note, pt requiring reminders for all spinal precautions but ultimately showed good adherence to said precautions following instruction; including log roll technique. While moving about, pt ambulates c dec tay, narrow DIANE and inc postural sway although she ultimately did well to avoid any LOB/ miss-steps. Pt does c/o of RLE coordination deficits and difficulty clearing it through swing although her RLE was generally 4/5 on break testing; will continue to monitor moving forward. Furthermore, all activity was performed on RA c pt denying any dizziness, lightheadedness or SOB throughout duration of session. At this time, pt is an appropriate candidate for skilled PT and will benefit from POC designed to address the aforementioned deficits. Upon completion of treatment, pt was positioned to comfort in chair with needs in reach. RN was made aware of pt performance. DC Recommendation: HHPT. Potential to progress to no needs.      [employer] [facility] 6 Clicks Basic Mobility Inpatient Short Form  AM-PAC Mobility Inpatient   How much difficulty turning over in bed?: A Little  How much difficulty sitting down on / standing up from a chair with arms?: A Little  How much difficulty moving from lying on back to sitting on side of bed?: A Little  How much help from another person moving to and from a bed to a chair?: A Little  How much help from another person needed to walk in hospital room?: A Little  How much help from another person for climbing 3-5 steps with a railing?: A Little  [facility] Inpatient Mobility Raw Score : 18  AM-PAC Inpatient T-Scale Score : 43.63  Mobility Inpatient CMS 0-100% Score: 46.58  Mobility Inpatient CMS G-Code Modifier : CK    SUBJECTIVE:   Ms. Sailaja Hendrickson states, \"I'll have help at home\"     Social/Functional      OBJECTIVE:     PAIN: VITALS / O2: PRECAUTION / Jolena Jewels / DRAINS:   Pre Treatment: 7/10 LBP         Post Treatment: 7/10 LBP Vitals        Oxygen   RA   Hemovac and IV    RESTRICTIONS/PRECAUTIONS:           Spinal Precautions: No Bending, No Lifting, No Twisting        GROSS EVALUATION: Intact Impaired (Comments):   AROM [x]  BLE WNL   PROM []    Strength [x]  LLLE 5/5; RLE 4/5    Balance [x]  Inc sway and narrow DIANE but no LOB   Posture [] Forward Head   Sensation [x]     Coordination []   C/o RLE coordination deficits while ambulating    Tone [x]     Edema []    Activity Tolerance []  Expected post op deficits     []      COGNITION/  PERCEPTION: Intact Impaired (Comments):   Orientation [x]     Vision []  L eye patch   Hearing [x]     Cognition  [x]       MOBILITY: I Mod I S SBA CGA Min Mod Max Total  NT x2 Comments:   Bed Mobility    Rolling [] [] [] [] [x] [] [] [] [] [] [] Inc time, cueing and use of rails for log-rolling    Supine to Sit [] [] [] [] [x] [] [] [] [] [] []    Scooting [] [] [] [] [x] [] [] [] [] [] []    Sit to Supine [] [] [] [] [] [] [] [] [] [] []    Transfers    Sit to Stand [] [] [] [] [x] [x] [] [] [] [] []    Bed to Chair [] [] [] [] [x] [x] [] [] [] [] []    Stand to Sit [] [] [] [] [x] [x] [] [] [] [] []     [] [] [] [] [] [] [] [] [] [] []    I=Independent, Mod I=Modified Independent, S=Supervision, SBA=Standby Assistance, CGA=Contact Guard Assistance,   Min=Minimal Assistance, Mod=Moderate Assistance, Max=Maximal Assistance, Total=Total Assistance, NT=Not Tested    GAIT: I Mod I S SBA CGA Min Mod Max Total  NT x2 Comments:   Level of Assistance [] [] [] [] [] [x] [] [] [] [] []    Distance   20'x2    DME HHA    Gait Quality Decreased tay , Decreased step clearance, Decreased step length, Narrow base of support, Shuffling , and Trunk sway increased    Weightbearing Status      Stairs      I=Independent, Mod I=Modified Independent, S=Supervision, SBA=Standby Assistance, CGA=Contact Guard Assistance, Min=Minimal Assistance, Mod=Moderate Assistance, Max=Maximal Assistance, Total=Total Assistance, NT=Not Tested    PLAN:   FREQUENCY AND DURATION: BID for duration of hospital stay or until stated goals are met, whichever comes first.    THERAPY PROGNOSIS: Good    PROBLEM LIST:   (Skilled intervention is medically necessary to address:)  Decreased ADL/Functional Activities  Decreased Activity Tolerance  Decreased Balance  Decreased Gait Ability  Decreased Strength  Decreased Transfer Abilities  Increased Pain INTERVENTIONS PLANNED:   (Benefits and precautions of physical therapy have been discussed with the patient.)  Self Care Training  Therapeutic Activity  Therapeutic Exercise/HEP  Gait Training  Education       TREATMENT:   EVALUATION: LOW COMPLEXITY: (Untimed Charge)    TREATMENT:   Therapeutic Activity (8 Minutes): Therapeutic activity included Rolling, Supine to Sit, Scooting, Lateral Scooting, Transfer Training, Ambulation on level ground, Sitting balance , and Standing balance to improve functional Activity tolerance, Balance, Mobility, and Strength.     TREATMENT GRID:  N/A    AFTER TREATMENT PRECAUTIONS: Bed/Chair Locked, Call light within reach, Chair, Needs within reach, RN at bedside, and RN notified    INTERDISCIPLINARY COLLABORATION:  RN/ PCT, PT/ PTA, and OT/ CHIN    EDUCATION: Education Given To: Patient  Education Provided: Role of Therapy    TIME IN/OUT:  Time In: [address]  Time Out: [address]  Minutes: 16    Asha Newell PT

## 2022-11-17 NOTE — PROGRESS NOTES
On a scale of 1-10, pt's pain is 7. Pt given 4mg of morphine at 9:30. Reassessed pt's pain at 11:00, it was still a 7. Pt given 10 mg of oxy, flexeril, and tylenol. Pt's pain was an 8 at 12:40, pt given another 4mg of morphine. Pt is currently resting comfortably and states pain is a 5.

## 2022-11-17 NOTE — PROGRESS NOTES
ACUTE PHYSICAL THERAPY GOALS:   (Developed with and agreed upon by patient and/or caregiver.)  ACUTE PHYSICAL THERAPY GOALS:   (Developed with and agreed upon by patient and/or caregiver.)  Pt will perform bed mobility Mod (I) c inc time but no cueing or use of rails in 7 therapy sessions. Pt will perform sit-to-stand/ stand-to-sit transfers c SB (A) and no LOB/ miss-steps in 7 therapy sessions. Pt will ambulate 500 ft SB (A) without LOB/ miss-steps and breaks as needed in 7 therapy sessions. Pt will perform standing dynamic balance activities with minimal postural sway in 7 therapy sessions. Pt will tolerate multiple sets and reps of BLE exercises in 7 therapy sessions. PHYSICAL THERAPY: Daily Note PM   (Link to Caseload Tracking: PT Visit Days : 1  Time In/Out PT Charge Capture  Rehab Caseload Tracker  Orders    SPINAL PRECAUTIONS    Berna Mackay is a 62 y.o. female   PRIMARY DIAGNOSIS: Spondylolisthesis of lumbar region  Spondylolisthesis of lumbar region [M43.16]  Lumbar radiculopathy [M54.16]  Spinal stenosis, lumbar region, with neurogenic claudication [M48.062]  S/P lumbar fusion [Z98.1]  Procedure(s) (LRB):  L3-L5 laminectomy and fusion with allograft and instrumentation; TRANSFORAMINAL lumbar  INTERBODY FUSION (N/A)  1 Day Post-Op  Observation: Payor: [employer] / Plan: VERÓNICA CABALLERO [address] / Product Type: *No Product type* /     ASSESSMENT:     REHAB RECOMMENDATIONS:   Recommendation to date pending progress:  Setting:  Home Health Therapy  Demonstrating progress toward  no needs     Equipment:    None  To Be Determined     ASSESSMENT:  Ms. Kashmir Cruz was supine in bed on RA upon entering the room and agreeable to therapy. Today, pt demonstrates continued progress toward established goals. This session, pt required HHA/Min (A) for sit-to-stand from EOB although she was otherwise CG (A) c inc time/ cueing for all remaining mobility; including log roll and ambulation of 150'x1.  Her gait cont to show dec tay, narrow DIANE and inc postural sway although she again did well to avoid any LOB/ miss-steps. Furthermore, pt denying any dizziness, lightheadedness or SOB throughout duration of session. At this time, pt remains a good candidate for skilled PT and will continue to benefit from advancing POC. At end of session, pt was positioned to comfort in bed with all needs in reach. RN was made aware of pt performance.         SUBJECTIVE:   Ms. Alexandrea Nogueira states, \"It's okay\"     Social/Functional    OBJECTIVE:     PAIN: [place] / O2: PRECAUTION / Dollene Reasons / Onalee Lauth:   Pre Treatment: 5/10 LBP         Post Treatment: 5/10 LBP Vitals        Oxygen   RA Hemovac and IV    RESTRICTIONS/PRECAUTIONS:  Position Activity Restriction  Spinal Precautions: No Bending, No Lifting, No Twisting     MOBILITY: I Mod I S SBA CGA Min Mod Max Total  NT x2 Comments:   Bed Mobility    Rolling [] [] [] [] [x] [] [] [] [] [] [] Inc time, cueing and use of rails for log-rolling in/out of bed   Supine to Sit [] [] [] [] [x] [] [] [] [] [] []    Scooting [] [] [] [] [x] [] [] [] [] [] []    Sit to Supine [] [] [] [] [x] [] [] [] [] [] []    Transfers    Sit to Stand [] [] [] [] [] [x] [] [] [] [] [] HHA for sit-to-stand   Bed to Chair [] [] [] [] [] [] [] [] [] [x] []    Stand to Sit [] [] [] [] [x] [] [] [] [] [] []     [] [] [] [] [] [] [] [] [] [] []    I=Independent, Mod I=Modified Independent, S=Supervision, SBA=Standby Assistance, CGA=Contact Guard Assistance,   Min=Minimal Assistance, Mod=Moderate Assistance, Max=Maximal Assistance, Total=Total Assistance, NT=Not Tested    BALANCE: Good Fair+ Fair Fair- Poor NT Comments   Sitting Static [x] [] [] [] [] []    Sitting Dynamic [x] [] [] [] [] []              Standing Static [] [x] [] [] [] []    Standing Dynamic [] [x] [] [] [] []      GAIT: I Mod I S SBA CGA Min Mod Max Total  NT x2 Comments:   Level of Assistance [] [] [] [] [x] [] [] [] [] [] []    Distance   150'x1    DME Gait Belt Gait Quality Decreased tay , Decreased step clearance, Decreased step length, Narrow base of support, Shuffling , and Trunk sway increased    Weightbearing Status      Stairs      I=Independent, Mod I=Modified Independent, S=Supervision, SBA=Standby Assistance, CGA=Contact Guard Assistance,   Min=Minimal Assistance, Mod=Moderate Assistance, Max=Maximal Assistance, Total=Total Assistance, NT=Not Tested    PLAN:   FREQUENCY AND DURATION: BID for duration of hospital stay or until stated goals are met, whichever comes first.    TREATMENT:   TREATMENT:   Therapeutic Activity (24 Minutes): Therapeutic activity included Rolling, Supine to Sit, Sit to Supine, Scooting, Lateral Scooting, Transfer Training, Ambulation on level ground, Sitting balance , and Standing balance to improve functional Activity tolerance, Balance, Coordination, Mobility, Strength, and ROM.     TREATMENT GRID:  N/A    AFTER TREATMENT PRECAUTIONS: Bed, Bed/Chair Locked, Call light within reach, Needs within reach, and RN notified    INTERDISCIPLINARY COLLABORATION:  RN/ PCT and PT/ PTA    EDUCATION: Education Given To: Patient  Education Provided: Role of Therapy    TIME IN/OUT:  Time In: 1300  Time Out: [address]  Minutes: 24    Siria Lara PT

## 2022-11-17 NOTE — PROGRESS NOTES
ORTHOPAEDIC PROGRESS NOTE    2022  Admit Date: 2022  Admit Diagnosis: Spondylolisthesis of lumbar region [M43.16]  Lumbar radiculopathy [M54.16]  Spinal stenosis, lumbar region, with neurogenic claudication [M48.062]  S/P lumbar fusion [Z98.1]  Procedure: Procedure(s):  L3-L5 laminectomy and fusion with allograft and instrumentation; TRANSFORAMINAL lumbar  INTERBODY FUSION  Post Op day: 1 Day Post-Op      Subjective:     Zane Santiago is a patient who has no complaints. Has been ambulating to bathroom and voided. Objective:     Vital Signs:    Blood pressure 121/79, pulse 92, temperature 99.1 °F (37.3 °C), temperature source Oral, resp. rate 16, height 5' 1\" (1.549 m), weight 124 lb (56.2 kg), SpO2 97 %. Temp (24hrs), Av.1 °F (36.7 °C), Min:97.3 °F (36.3 °C), Max:99.1 °F (37.3 °C)      No intake/output data recorded. 11/15 190 -  0700  In: 2062 [P.O.:150; I.V.:1500]  Out: 1290 [Urine:575; Drains:185]    LAB:    No results for input(s): HGB, WBC, PLT in the last 72 hours. Physical Exam    General:   Alert and oriented. No acute distress  Lungs:  Respirations unlabored. Extremities: No evidence of cyanosis. Calves soft, nontender. Moves both upper and lower extremities.    Dressing:  clean, dry, and intact  Neuro:  no deficit      Assessment:      Patient Active Problem List   Diagnosis    Bacteremia due to Gram-positive bacteria    Depression    Complication of prosthetic eye    Acute respiratory failure with hypoxia (HCC)    Eye pain    Anxiety and depression    Acute metabolic encephalopathy    History of enucleation of left eyeball    Symblepharon of left eye    Hypokalemia    Weakness    Pneumonia due to COVID-19 virus    Multifocal pneumonia    DJD (degenerative joint disease)    Spondylolisthesis of lumbar region    Lumbar radiculopathy    Spinal stenosis, lumbar region, with neurogenic claudication       Plan:     Continue PT/OT  Discontinue: drain when less than 40 cc or Friday  Consult: none    Anticipate Discharge To: HOME Friday         Signed By: Bertin Sharma PA-C

## 2022-11-17 NOTE — PLAN OF CARE
Problem: Pain  Goal: Verbalizes/displays adequate comfort level or baseline comfort level  Outcome: Progressing     Problem: Discharge Planning  Goal: Discharge to home or other facility with appropriate resources  Outcome: Progressing  Flowsheets (Taken 11/16/2022 1956)  Discharge to home or other facility with appropriate resources: Identify barriers to discharge with patient and caregiver

## 2022-11-17 NOTE — PROGRESS NOTES
ACUTE OCCUPATIONAL THERAPY GOALS:   (Developed with and agreed upon by patient and/or caregiver.)  1. Patient will verbalize and demonstrate understanding of spinal precautions with 100% accuracy during ADLs. 2. Patient will complete lower body bathing and dressing with modified independence and adaptive equipment as needed. 3. Patient will complete functional transfers with modified independence and adaptive equipment as needed. 4. Patient will complete toileting and toilet transfer with modified independence. 5. Patient will complete functional mobility of household distances with modified independence and adaptive equipment as needed. 6. Patient will demonstrate ability to log roll in bed with modified independence and no verbal cues from therapist.     Timeframe: 7 visits       OCCUPATIONAL THERAPY Initial Assessment and Daily Note       OT Visit Days: 1  Acknowledge Orders  Time  OT Charge Capture  Rehab Caseload Tracker      Dimitrios Livingston is a 62 y.o. female   PRIMARY DIAGNOSIS: Spondylolisthesis of lumbar region  Spondylolisthesis of lumbar region [M43.16]  Lumbar radiculopathy [M54.16]  Spinal stenosis, lumbar region, with neurogenic claudication [M48.062]  S/P lumbar fusion [Z98.1]  Procedure(s) (LRB):  L3-L5 laminectomy and fusion with allograft and instrumentation; TRANSFORAMINAL lumbar  INTERBODY FUSION (N/A)  1 Day Post-Op  Reason for Referral: Low Back Pain (M54.5)  Observation: Payor: [employer] / Plan: VERÓNICA CABALLERO [address] / Product Type: *No Product type* /     ASSESSMENT:     REHAB RECOMMENDATIONS:   Recommendation to date pending progress:  Setting:  Home Health Therapy    Equipment:    To Be Determined     ASSESSMENT:  Ms. Shantelle Evans is a 63 y/o F who is POD#1 from above procedure. Baseline independent, working as an RN. Today reports pain 7/10 (RN present to give pain meds). Educated patient on spinal precautions, log roll technique, and adaptive technique for LB ADLs.  Mobile today with CGA x2 with c/o LLE weakness. Participated in toileting & grooming ADLs. She is functioning below baseline and would benefit from continued OT to increase independence and safety. [address] [facility] [address] [id number] AM-PAC 6 Clicks Daily Activity Inpatient Short Form:    AM-PAC Daily Activity Inpatient   How much help for putting on and taking off regular lower body clothing?: A Lot  How much help for Bathing?: A Lot  How much help for Toileting?: A Little  How much help for putting on and taking off regular upper body clothing?: A Little  How much help for taking care of personal grooming?: A Little  How much help for eating meals?: A Little  AM-PAC Inpatient Daily Activity Raw Score: 16  AM-PAC Inpatient ADL T-Scale Score : 35.96  ADL Inpatient CMS 0-100% Score: 53.32  ADL Inpatient CMS G-Code Modifier : CK           SUBJECTIVE:     Ms. Kashmir Cruz states, \"I'm an L&D nurse. \"     Social/Functional  Lives with daughter. Typically independent with ADLs and driving. 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      OBJECTIVE:     Wanda Riddles / Juan Pablo Piper / [employer]: IV, drain    RESTRICTIONS/PRECAUTIONS:  Position Activity Restriction  Spinal Precautions: No Bending, No Lifting, No Twisting    PAIN: VITALS / O2:   Pre Treatment:          Post Treatment: same       Vitals          Oxygen            GROSS EVALUATION: INTACT IMPAIRED   (See Comments)   UE AROM [x] []   UE PROM [x] []   Strength [x]       Posture / Balance [] Posture: Good  Sitting - Static: Good  Sitting - Dynamic: Good  Standing - Static: Fair, +  Standing - Dynamic: Fair, +   Sensation []  NT   Coordination [x]       Tone [x]       Edema [x]    Activity Tolerance [] Patient limited by pain     Hand Dominance R [x] L []      COGNITION/  PERCEPTION: INTACT IMPAIRED   (See Comments)   Orientation [x]     Vision [x]     Hearing [x]     Cognition  [x]     Perception [x]       MOBILITY: I Mod I S SBA CGA Min Mod Max Total  NT x2 Comments:   Bed Mobility    Rolling [] [] [] [] [x] [] [] [] [] [] []    Supine to Sit [] [] [] [] [x] [] [] [] [] [] [] Step by step cues for log roll    Scooting [] [] [] [] [x] [] [] [] [] [] []    Sit to Supine [] [] [] [] [] [] [] [] [] [x] []    Transfers    Sit to Stand [] [] [] [] [x] [] [] [] [] [] []    Bed to Chair [] [] [] [] [x] [] [] [] [] [] []    Stand to Sit [] [] [] [] [x] [] [] [] [] [] []    Tub/Shower [] [] [] [] [] [] [] [] [] [] []     Toilet [] [] [] [] [x] [] [] [] [] [] []      [] [] [] [] [] [] [] [] [] [] []    I=Independent, Mod I=Modified Independent, S=Supervision/Setup, SBA=Standby Assistance, CGA=Contact Guard Assistance, Min=Minimal Assistance, Mod=Moderate Assistance, Max=Maximal Assistance, Total=Total Assistance, NT=Not Tested    ACTIVITIES OF DAILY LIVING: I Mod I S SBA CGA Min Mod Max Total NT Comments   BASIC ADLs:              Upper Body Bathing  [] [] [] [] [] [] [] [] [] []    Lower Body Bathing [] [] [] [] [] [] [] [] [] []    Toileting [] [] [x] [] [] [] [] [] [] []    Upper Body Dressing [] [] [] [] [] [] [] [] [] []    Lower Body Dressing [] [] [] [x] [] [] [] [] [] [] Adaptive technique training    Feeding [] [] [] [] [] [] [] [] [] []    Grooming [] [] [] [x] [] [] [] [] [] [] In standing at sink   Personal [address] [] [] [] [] [] [] [] [] [] []    Functional Mobility [] [] [] [] [x] [] [] [] [] []    I=Independent, Mod I=Modified Independent, S=Supervision/Setup, SBA=Standby Assistance, CGA=Contact Guard Assistance, Min=Minimal Assistance, Mod=Moderate Assistance, Max=Maximal Assistance, Total=Total Assistance, NT=Not Tested    PLAN:   FREQUENCY/DURATION   OT Plan of Care: 3 times/week for duration of hospital stay or until stated goals are met, whichever comes first.    PROBLEM LIST:   (Skilled intervention is medically necessary to address:)  Decreased ADL/Functional Activities  Decreased Activity Tolerance  Decreased AROM/PROM  Decreased Balance  Decreased Transfer Abilities  Increased Pain INTERVENTIONS PLANNED:  (Benefits and precautions of occupational therapy have been discussed with the patient.)  Self Care Training  Therapeutic Activity  Therapeutic Exercise/HEP  Neuromuscular Re-education  Manual Therapy  Education         TREATMENT:     EVALUATION: LOW COMPLEXITY: (Untimed Charge)    TREATMENT:   Self Care (10 minutes): Patient participated in toileting and grooming ADLs in unsupported sitting and standing with minimal verbal cueing to increase independence and increase safety awareness. Patient also participated in functional mobility, functional transfer, and adaptive technique training to increase independence and increase safety awareness.      TREATMENT GRID:  N/A    AFTER TREATMENT PRECAUTIONS: Chair, Needs within reach, and RN notified    INTERDISCIPLINARY COLLABORATION:  RN/ PCT, PT/ PTA, and OT/ CHIN    EDUCATION:  Education Given To: Patient  Education Provided: Role of Therapy;Plan of Care  Education Outcome: Continued education needed    TOTAL TREATMENT DURATION AND TIME:  Time In: 2461  Time Out: 0494  Minutes: [address], OT

## 2022-11-18 VITALS
DIASTOLIC BLOOD PRESSURE: 83 MMHG | BODY MASS INDEX: 23.41 KG/M2 | HEART RATE: 105 BPM | TEMPERATURE: 99 F | HEIGHT: 61 IN | RESPIRATION RATE: 17 BRPM | WEIGHT: 124 LBS | OXYGEN SATURATION: 96 % | SYSTOLIC BLOOD PRESSURE: 122 MMHG

## 2022-11-18 PROCEDURE — G0378 HOSPITAL OBSERVATION PER HR: HCPCS

## 2022-11-18 PROCEDURE — 2580000003 HC RX 258: Performed by: ORTHOPAEDIC SURGERY

## 2022-11-18 PROCEDURE — 6370000000 HC RX 637 (ALT 250 FOR IP): Performed by: ORTHOPAEDIC SURGERY

## 2022-11-18 PROCEDURE — 97116 GAIT TRAINING THERAPY: CPT

## 2022-11-18 RX ADMIN — SODIUM CHLORIDE, PRESERVATIVE FREE 10 ML: 5 INJECTION INTRAVENOUS at 09:26

## 2022-11-18 RX ADMIN — OXYCODONE 10 MG: 5 TABLET ORAL at 09:23

## 2022-11-18 RX ADMIN — LISINOPRIL 5 MG: 5 TABLET ORAL at 09:23

## 2022-11-18 RX ADMIN — ACETAMINOPHEN 650 MG: 325 TABLET ORAL at 11:50

## 2022-11-18 RX ADMIN — ACETAMINOPHEN 650 MG: 325 TABLET ORAL at 05:48

## 2022-11-18 RX ADMIN — GABAPENTIN 100 MG: 100 CAPSULE ORAL at 09:23

## 2022-11-18 RX ADMIN — ESCITALOPRAM OXALATE 10 MG: 10 TABLET ORAL at 09:23

## 2022-11-18 ASSESSMENT — PAIN DESCRIPTION - ONSET: ONSET: GRADUAL

## 2022-11-18 ASSESSMENT — PAIN DESCRIPTION - ORIENTATION: ORIENTATION: MID

## 2022-11-18 ASSESSMENT — PAIN SCALES - GENERAL
PAINLEVEL_OUTOF10: 0
PAINLEVEL_OUTOF10: 8

## 2022-11-18 ASSESSMENT — PAIN DESCRIPTION - DESCRIPTORS: DESCRIPTORS: ACHING

## 2022-11-18 ASSESSMENT — PAIN DESCRIPTION - LOCATION: LOCATION: BACK

## 2022-11-18 ASSESSMENT — PAIN DESCRIPTION - PAIN TYPE: TYPE: SURGICAL PAIN

## 2022-11-18 ASSESSMENT — PAIN DESCRIPTION - FREQUENCY: FREQUENCY: INTERMITTENT

## 2022-11-18 NOTE — DISCHARGE INSTRUCTIONS
MAY shower NO tub bathing    LEAVE dressing on incision for 3 DAYS-->then may remove   (If dressing starts to fall off may re-secure with tape)    NO lifting anything heavier than 5LBS     NO Bending, Lifting or Twisting    NO driving until directed by your doctor    Avoid sitting more than 20 - 30 minutes at a time    DO NOT take any NSAIDS (either prescribed or over the counter until directed    (Aleve, Ibuprofen, Mobic, etc) as this will interfere with bone healing    CALL the doctor if:  Fever >100.5  ([phone])                 Incision becomes red, swollen or  opens up               Incision has yellow, thick drainage or an odor               Pain is not managed with prescribed medications               Excessive nausea and/or vomiting    Avoid having pets sleep in bed with you until incision is completely healed

## 2022-11-18 NOTE — CARE COORDINATION
Pt is for discharge home today with spouse. Referral called/faxed to [facility] for follow up home care as ordered as designated by Ortho Surgery. No additional CM orders received or supportive care needs expressed at this time. 11/18/22 1155   Service Assessment   Last Visit to PCP Within last 3 months   Prior Functional Level Independent in ADLs/IADLs   Current Functional Level Assistance with the following:;Mobility   Can patient return to prior living arrangement Yes   Ability to make needs known: Good   Family able to assist with home care needs: Yes   Would you like for me to discuss the discharge plan with any other family members/significant others, and if so, who? No   Financial Resources Other (Comment)  ([address])   Social/Functional History   Lives With Spouse   Type of Home House   Discharge Planning   Type of Residence House   Living Arrangements Spouse/Significant Other   Current Services Prior To Admission None   Potential [facility]  (PT/OT)   DME Ordered? No   Potential Assistance Purchasing Medications No   Type of Home Care Services PT;OT   Patient expects to be discharged to: [employer] [facility] Discharge   Transition of Care Consult (CM Consult) Home Health;Discharge Planning   Internal Home Health No   Reason Outside [address] Physician referred to specific agency  (referral to [address] [place] [address] by Ortho)   [address] Provided? No   Mode of Transport at Discharge Other (see comment)  (family)   Confirm Follow Up Transport Family   Condition of Participation: Discharge Planning   The Plan for Transition of Care is related to the following treatment goals: Pt will need rehab services to return to her functional baseline. The Patient and/or Patient Representative was provided with a Choice of Provider? Patient   The Patient and/Or Patient Representative agree with the Discharge Plan?  Yes   Freedom of Choice list was provided with basic dialogue that supports the patient's individualized plan of care/goals, treatment preferences, and shares the quality data associated with the providers?   Yes

## 2022-11-18 NOTE — PLAN OF CARE
Problem: Pain  Goal: Verbalizes/displays adequate comfort level or baseline comfort level  11/17/2022 2336 by Liliane Lombardi RN  Outcome: Progressing  11/17/2022 1313 by Andrzej Escobar RN  Outcome: Progressing     Problem: Discharge Planning  Goal: Discharge to home or other facility with appropriate resources  11/17/2022 2336 by Liliane Lombardi RN  Outcome: Progressing  11/17/2022 1313 by Andrzej Escobar RN  Outcome: Progressing

## 2022-11-18 NOTE — PROGRESS NOTES
ORTHOPAEDIC PROGRESS NOTE    2022  Admit Date: 2022  Admit Diagnosis: Spondylolisthesis of lumbar region [M43.16]  Lumbar radiculopathy [M54.16]  Spinal stenosis, lumbar region, with neurogenic claudication [M48.062]  S/P lumbar fusion [Z98.1]  Procedure: Procedure(s):  L3-L5 laminectomy and fusion with allograft and instrumentation; TRANSFORAMINAL lumbar  INTERBODY FUSION  Post Op day: 2 Days Post-Op      Subjective:     Dimitrios Livingston is a patient who has no complaints. Ambulated 150ft       Objective:     Vital Signs:    Blood pressure 122/83, pulse (!) 105, temperature 99 °F (37.2 °C), temperature source Oral, resp. rate 17, height 5' 1\" (1.549 m), weight 124 lb (56.2 kg), SpO2 96 %. Temp (24hrs), Av.1 °F (37.3 °C), Min:98.7 °F (37.1 °C), Max:100.2 °F (37.9 °C)      No intake/output data recorded.  1901 -  0700  In: -   Out: 690 [Urine:375; Drains:315]    LAB:    No results for input(s): HGB, WBC, PLT in the last 72 hours. Physical Exam    General:   Alert and oriented. No acute distress  Lungs:  Respirations unlabored. Extremities: No evidence of cyanosis. Calves soft, nontender. Moves both upper and lower extremities.    Dressing:  clean, dry, and intact  Neuro:  no deficit      Assessment:      Patient Active Problem List   Diagnosis    Bacteremia due to Gram-positive bacteria    Depression    Complication of prosthetic eye    Acute respiratory failure with hypoxia (HCC)    Eye pain    Anxiety and depression    Acute metabolic encephalopathy    History of enucleation of left eyeball    Symblepharon of left eye    Hypokalemia    Weakness    Pneumonia due to COVID-19 virus    Multifocal pneumonia    DJD (degenerative joint disease)    Spondylolisthesis of lumbar region    Lumbar radiculopathy    Spinal stenosis, lumbar region, with neurogenic claudication       Plan:     Continue PT/OT  Discontinue: IV and drain    Consult: none    Anticipate Discharge To: HOME today after am PT       Signed By: Mesha Oglesby, NATALIA - CNP

## 2022-11-18 NOTE — PROGRESS NOTES
ACUTE PHYSICAL THERAPY GOALS:   (Developed with and agreed upon by patient and/or caregiver.)  ACUTE PHYSICAL THERAPY GOALS:   (Developed with and agreed upon by patient and/or caregiver.)  Pt will perform bed mobility Mod (I) c inc time but no cueing or use of rails in 7 therapy sessions. Pt will perform sit-to-stand/ stand-to-sit transfers c SB (A) and no LOB/ miss-steps in 7 therapy sessions. Pt will ambulate 500 ft SB (A) without LOB/ miss-steps and breaks as needed in 7 therapy sessions. Pt will perform standing dynamic balance activities with minimal postural sway in 7 therapy sessions. Pt will tolerate multiple sets and reps of BLE exercises in 7 therapy sessions. PHYSICAL THERAPY: Daily Note AM   (Link to Caseload Tracking: PT Visit Days : 2  Time In/Out PT Charge Capture  Rehab Caseload Tracker  Orders    SPINAL PRECAUTIONS    Rachelle Ernandez is a 62 y.o. female   PRIMARY DIAGNOSIS: Spondylolisthesis of lumbar region  Spondylolisthesis of lumbar region [M43.16]  Lumbar radiculopathy [M54.16]  Spinal stenosis, lumbar region, with neurogenic claudication [M48.062]  S/P lumbar fusion [Z98.1]  Procedure(s) (LRB):  L3-L5 laminectomy and fusion with allograft and instrumentation; TRANSFORAMINAL lumbar  INTERBODY FUSION (N/A)  2 Days Post-Op  Observation: Payor: [employer] / Plan: Brenda Gallegos [address] / Product Type: *No Product type* /     ASSESSMENT:     REHAB RECOMMENDATIONS:   Recommendation to date pending progress:  Setting:  Home Health Therapy  Demonstrating progress toward  no needs     Equipment:    None  To Be Determined     ASSESSMENT:  Ms. Tia Fernandes was supine in bed on entering the room and agreeable to therapy. She rolled to side and got to EOB, stood and was able to amb 200' and no AD. She returned to EOB and performed B LE ex. Returned supine via reverse log roll. Recalled 2/3 precautions. SUBJECTIVE:   Ms. Tia Fernandes states, \"My R foot is numb\".      Social/Functional Lives With: Spouse  Type of Home: House  OBJECTIVE:     PAIN: VITALS / O2: PRECAUTION / Delphine Broody / DRAINS:   Pre Treatment:          Post Treatment: 5/10 LBP Vitals        Oxygen   RA Hemovac    RESTRICTIONS/PRECAUTIONS:  Position Activity Restriction  Spinal Precautions: No Bending, No Lifting, No Twisting     MOBILITY: I Mod I S SBA CGA Min Mod Max Total  NT x2 Comments:   Bed Mobility    Rolling [] [] [] [x] [] [] [] [] [] []     Supine to Sit [] [] [] [x] [] [] [] [] [] [] []    Scooting [] [] [] [x] [] [] [] [] [] [] []    Sit to Supine [] [] [] [x] [] [] [] [] [] [] []    Transfers    Sit to Stand [] [] [] [x] [] [] [] [] [] [] []    Bed to Chair [] [] [] [] [] [] [] [] [] [x] []    Stand to Sit [] [] [] [x] [] [] [] [] [] [] []     [] [] [] [] [] [] [] [] [] [] []    I=Independent, Mod I=Modified Independent, S=Supervision, SBA=Standby Assistance, CGA=Contact Guard Assistance,   Min=Minimal Assistance, Mod=Moderate Assistance, Max=Maximal Assistance, Total=Total Assistance, NT=Not Tested    BALANCE: Good Fair+ Fair Fair- Poor NT Comments   Sitting Static [x] [] [] [] [] []    Sitting Dynamic [x] [] [] [] [] []              Standing Static [] [x] [] [] [] []    Standing Dynamic [] [x] [] [] [] []      GAIT: I Mod I S SBA CGA Min Mod Max Total  NT x2 Comments:   Level of Assistance [] [] [] [] [x] [] [] [] [] [] []    Distance   200'x1    DME None    Gait Quality Decreased tay , Decreased step clearance, Decreased step length, Narrow base of support, Shuffling , and Trunk sway increased    Weightbearing Status      Stairs      I=Independent, Mod I=Modified Independent, S=Supervision, SBA=Standby Assistance, CGA=Contact Guard Assistance,   Min=Minimal Assistance, Mod=Moderate Assistance, Max=Maximal Assistance, Total=Total Assistance, NT=Not Tested    PLAN:   FREQUENCY AND DURATION: BID for duration of hospital stay or until stated goals are met, whichever comes first.    TREATMENT:   TREATMENT:   Therapeutic Activity (23 Minutes): Therapeutic activity included Rolling, Supine to Sit, Sit to Supine, Scooting, Lateral Scooting, Transfer Training, Ambulation on level ground, Sitting balance , Standing balance, and LE ex to improve functional Activity tolerance, Balance, Coordination, Mobility, Strength, and ROM.     TREATMENT GRID:  N/A    AFTER TREATMENT PRECAUTIONS: Bed, Bed/Chair Locked, Call light within reach, Needs within reach, and RN notified    INTERDISCIPLINARY COLLABORATION:  RN/ PCT and PT/ PTA    EDUCATION:      TIME IN/OUT:  Time In: 0957  Time Out: 1020  Minutes: 330 Alex Escobedo PTA

## 2022-11-18 NOTE — DISCHARGE SUMMARY
remained clean, dry, and intact. Physical Therapy started on the day following surgery and progressed to ambulation without assistance. At the time of discharge, the patient had understanding of precautions needed following surgery. Postoperative complications requiring extended length of stay: None    Discharged to: Home    Discharge Medications:      Medication List        START taking these medications      oxyCODONE-acetaminophen 5-325 MG per tablet  Commonly known as: Percocet  Take 1 tablet by mouth every 6 hours as needed for Pain for up to 7 days. Intended supply: 7 days. Take lowest dose possible to manage pain            CONTINUE taking these medications      butalbital-acetaminophen-caffeine -40 MG per tablet  Commonly known as: FIORICET, ESGIC  Take 1 tablet by mouth 2 times daily as needed for Headaches     escitalopram 10 MG tablet  Commonly known as: LEXAPRO     gabapentin 100 MG capsule  Commonly known as: NEURONTIN     lisinopril 5 MG tablet  Commonly known as: PRINIVIL;ZESTRIL     traMADol 50 MG tablet  Commonly known as: ULTRAM     traZODone 50 MG tablet  Commonly known as: DESYREL  Take 1 tablet by mouth nightly as needed for Sleep Take 1/2 to 1 tablet at bedtime as needed for sleep. Where to Get Your Medications        These medications were sent to [address] [place] [address] [address]  [address]      Phone: [phone]   oxyCODONE-acetaminophen 5-325 MG per tablet          Discharge instructions:  -Resume pre hospital diet            -Resume home medications per medical continuation form     -Follow up in office as scheduled   -Call doctor immediately if T>100.5, increased pain, swelling, drainage.   -If shortness of breath or chest pain, immediately go to ER  -Post surgical instruction sheet given to patient    Signed:  NATALIA Ann - KRISTIN  11/18/2022

## 2022-11-21 ENCOUNTER — CARE COORDINATION (OUTPATIENT)
Dept: OTHER | Facility: CLINIC | Age: 57
End: 2022-11-21

## 2022-11-21 ENCOUNTER — TELEPHONE (OUTPATIENT)
Dept: ORTHOPEDIC SURGERY | Age: 57
End: 2022-11-21

## 2022-11-21 DIAGNOSIS — T40.605A NARCOTIC-INDUCED NAUSEA AND VOMITING: Primary | ICD-10-CM

## 2022-11-21 DIAGNOSIS — R11.2 NARCOTIC-INDUCED NAUSEA AND VOMITING: Primary | ICD-10-CM

## 2022-11-21 RX ORDER — ONDANSETRON 4 MG/1
4 TABLET, FILM COATED ORAL EVERY 8 HOURS PRN
COMMUNITY

## 2022-11-21 RX ORDER — PROMETHAZINE HYDROCHLORIDE 25 MG/1
25 TABLET ORAL 3 TIMES DAILY PRN
Qty: 12 TABLET | Refills: 0 | Status: SHIPPED | OUTPATIENT
Start: 2022-11-21 | End: 2022-11-28

## 2022-11-21 RX ORDER — PROMETHAZINE HYDROCHLORIDE 25 MG/1
25 TABLET ORAL EVERY 8 HOURS PRN
Qty: 21 TABLET | Refills: 0 | Status: SHIPPED | OUTPATIENT
Start: 2022-11-21 | End: 2022-11-21

## 2022-11-21 RX ORDER — HYDROMORPHONE HYDROCHLORIDE 2 MG/1
2 TABLET ORAL EVERY 4 HOURS PRN
Qty: 40 TABLET | Refills: 0 | Status: SHIPPED | OUTPATIENT
Start: 2022-11-21 | End: 2022-11-28

## 2022-11-21 NOTE — TELEPHONE ENCOUNTER
She had surgery Wednesday. Her pain meds are making her nauseated and Zofran isn't helping. Can you change her meds? Also [facility] came out and they are not in network with her insurance, it will have to be through [facility]. Can you change the home health please. She uses [facility] on [address].

## 2022-11-21 NOTE — CARE COORDINATION
[facility] Care Transitions Initial Follow Up Call    Call within 2 business days of discharge: Yes    Care Transition Nurse contacted the patient by telephone to perform post hospital discharge assessment. Verified name and  with patient as identifiers. Provided introduction to self, and explanation of the Care Transition Nurse role. Patient: Jean-Claude Whatley Patient : 1965   MRN: [id number]  Reason for Admission: Spondylolisthesis of lumbar region  Discharge Date: 22 RARS: No data recorded    Last Discharge  Street       Date Complaint Diagnosis Description Type Department Provider    22  Spondylolisthesis of lumbar region . .. Admission (Discharged) [id number] Monse Chiu MD            Was this an external facility discharge? No Discharge Facility: [facility]    Challenges to be reviewed by the provider   Additional needs identified to be addressed with provider: Yes  home health care-Needs new referral to [facility] as [facility] not in-network   medications-Zofran not helping nausea and Percocet not helping pain                Method of communication with provider: staff message. And phone call to office     Able to review chart and see pt concerns for nausea and not having HH from MA message. Pt reports She is only on Zofran and Percocet at home. All other medications updated as she is not taking. Pt reports Zofran is not controlling nausea and Percocet is not controlling pain. Contacted Dr. Melecio Roman office and left message for staff. Tamika Horn Knows via Epic and reports pt needs. Also, [address] [place] [address] [facility] closes at 5 pm. Requested Dr. Horn Knows to send to [facility] on [address] if he was able to send and it was after 5 pm today. Will follow up on these issues tomorrow and contact pt again tomorrow. Pt agreeable to [facility] follow up. Care Transition Nurse reviewed discharge instructions and red flags with patient who verbalized understanding.  The patient was given an opportunity to ask questions and does not have any further questions or concerns at this time. Were discharge instructions available to patient? Yes. Reviewed appropriate site of care based on symptoms and resources available to patient including: PCP  Specialist  When to call 911. The patient agrees to contact the specialist office for questions related to their healthcare. Advance Care Planning:   Does patient have an Advance Directive:  will attempt next visit  . Medication reconciliation was performed with patient, who verbalizes understanding of administration of home medications. Non-face-to-face services provided:  Obtained and reviewed discharge summary and/or continuity of care documents  Assessment and support for treatment adherence and medication management-contact specialist for concerns     Care Transitions 24 Hour Call    Schedule Follow Up Appointment with PCP: Completed  Patient-reported symptoms: Pain, Nausea  Do you have a copy of your discharge instructions?: Yes  Do you have all of your prescriptions and are they filled?: Yes  Have you been contacted by a [address]?: No  Have you scheduled your follow up appointment?: Yes  Do you have support at home?: Partner/Spouse/SO  Do you feel like you have everything you need to keep you well at home?: No  Are you an active caregiver in your home?: No  Care Transitions Interventions         Follow Up  Future Appointments   Date Time Provider Jalil Llanos   12/2/2022 11:00 AM MD ELIZABETH Hooks [facility] AMB   3/3/2023 11:00 AM IVANA Velez [facility] AMB   3/29/2023  9:00 AM [id number]       Care Transition Nurse provided contact information. Plan for follow-up call in 1-2 days based on severity of symptoms and risk factors.   Plan for next call: symptom management-nausea, pain, HH needs addressed     Tasha Polanco RN

## 2022-11-22 ENCOUNTER — CARE COORDINATION (OUTPATIENT)
Dept: OTHER | Facility: CLINIC | Age: 57
End: 2022-11-22

## 2022-11-22 ENCOUNTER — TELEPHONE (OUTPATIENT)
Dept: ORTHOPEDIC SURGERY | Age: 57
End: 2022-11-22

## 2022-11-22 NOTE — CARE COORDINATION
[facility] Care Transitions Follow Up Call    Care Transition Nurse contacted the patient by telephone to follow up after admission on 2022. Verified name and  with patient as identifiers. Patient: Chance Mckeon  Patient : 1965   MRN: [id number]  Reason for Admission: Spondylolisthesis of lumbar region  Discharge Date: 22 RARS: No data recorded    Needs to be reviewed by the provider   Additional needs identified to be addressed with provider: Yes  HH need              Method of communication with provider: Contacted office     Pt returned ACM call and reported she is aware of new medication available at [facility] on [address] Pt still having nausea and pain and having a \"rough\" day today. Pt spouse will be picking up medication at pharmacy today. Pt reports getting call from [address] [place] [address] but declining as she needs [facility] for insurance in-network need. Placed a second call to specilaist office and able to reach staff. Information provided for [facility] need and office staff assured this will be addressed. Discussed post-op/discharge instructions. Pt does report moving around often and following post-op precautions (no bending, twisting, or lifting). Pt encouraged continue mobility while waiting on [facility] to be established. Pt agreeable for follow up contact. Addressed changes since last contact:  medications-new medications ordered   Discussed follow-up appointments. If no appointment was previously scheduled, appointment scheduling offered: n/a. Is follow up appointment scheduled within 7 days of discharge? Yes.     Follow Up  Future Appointments   Date Time Provider Jalil Llanos   2022 11:00 AM MD ELIZABETH Keyes [facility] AMB   3/3/2023 11:00 AM IVANA Velez [facility] AMB   3/29/2023  9:00 AM Georgina Mathis MD [id number] [facility] AMB     Non-[facility] follow up appointment(s): n/a     Care Transition Nurse reviewed discharge instructions and red flags with patient and discussed any barriers to care and/or understanding of plan of care after discharge. Discussed appropriate site of care based on symptoms and resources available to patient including: PCP  Specialist  When to call [address] The patient agrees to contact the PCP office for questions related to their healthcare. Advance Care Planning: Will follow up  . Patients top risk factors for readmission: ineffective coping and medication management  Interventions to address risk factors: Obtained and reviewed discharge summary and/or continuity of care documents and Assessment and support for treatment adherence and medication management-new medications      Care Transitions Subsequent and Final Call    Schedule Follow Up Appointment with PCP: Completed  Subsequent and Final Calls  Do you have any ongoing symptoms?: Yes  Onset of Patient-reported symptoms: In the past 7 days  Patient-reported symptoms: Nausea, Pain  Interventions for patient-reported symptoms: Other  Have your medications changed?: Yes  Patient Reports: added Dilaudid and Phenergan  Do you have any questions related to your medications?: No  Do you currently have any active services?: Yes  Are you currently active with any services?: Home Health  Do you have any needs or concerns that I can assist you with?: Yes  Patient-reported Needs or Concerns: HH needs  Identified Barriers: Stress  Care Transitions Interventions     Other Services:  (Comment: post-op education)   Other Interventions:             Care Transition Nurse provided contact information for future needs. Plan for follow-up call in 5-7 days based on severity of symptoms and risk factors. Plan for next call: symptom management-pain/nasuea  medication management-helping, HH started?      Marianne Stanley RN

## 2022-11-22 NOTE — TELEPHONE ENCOUNTER
Sent a message to Vahid Main with [facility] to defer this patient to [facility]

## 2022-11-22 NOTE — TELEPHONE ENCOUNTER
Please call pt she need homehealth orders sent [facility]. not [facility] they are [facility]

## 2022-11-22 NOTE — CARE COORDINATION
[facility] Care Transitions Follow Up Call    Patient: Jessica Lemus  Patient : 1965   MRN: [id number]  Reason for Admission: Spondylolisthesis of lumbar region  Discharge Date: 22 RARS: No data recorded    ACM attempted to reach patient for follow up call regarding IP stay follow up needs of HH, pain, and nausea. HIPAA compliant message left requesting a return phone call at patients convenience. Chart reviewed and Dr. Steven Tran did prescribe Dilaudid to assist with uncontrolled pain and Phenergan to assist with uncontrolled nausea. Prescriptions were sent to [facility] on [address] as it was sent after hours. Unable to see any follow up on [facility] needs. Pt will need a new referral for [facility] but no return call or return Epic contact from provider/ provider office. Will continue to follow.        Follow Up  Future Appointments   Date Time Provider Jalil Llanos   2022 11:00 AM Oliva Langford MD [facility] AMB   3/3/2023 11:00 AM IVANA Velez [facility] AMB   3/29/2023  9:00 AM Ginny Burrell MD [id number] [facility] AMB     Non-[facility] follow up appointment(s): n/a    Nemo Pena RN

## 2022-11-28 ENCOUNTER — CARE COORDINATION (OUTPATIENT)
Dept: OTHER | Facility: CLINIC | Age: 57
End: 2022-11-28

## 2022-11-28 NOTE — CARE COORDINATION
[facility] Care Transitions Follow Up Call    Care Transition Nurse contacted the patient by telephone to follow up after admission on 2022. Verified name and  with patient as identifiers. Patient: Estee Bernal  Patient : 1965   MRN: [id number]  Reason for Admission: Spondylolisthesis of lumbar region  Discharge Date: 22 RARS: No data recorded    Needs to be reviewed by the provider   Additional needs identified to be addressed with provider: No  none             Method of communication with provider: none. Contacted [facility] to assure all needed documents arrived for [facility] referral. [facility] liaison reports they did not accept referrals at the end of the last week r/t staffing. Liaison reports no referral received. [address] liaison and she reports she is in network with pt insurance and out of pocket cost is related to pt not meeting full deductibles this year. Stephon reports she explained to pt at initial call and waiting for pt decision. Liaison reports [facility] can arrange payment options with pt as well. Contacted pt and she reports being aware of [facility] staffing issues. Pt aware staffing is no longer an issue but she would need to meet deductible with any HH option. Pt wants to keep [address] and get [facility] started as soon as possible. [facility] liaison aware and plans to reach pt and start first in home visit tomorrow. Pt aware. Pt reports her pain is now well managed and she feels she is getting around better but eager to have [facility] come in to make progress. Pt reports holidays were hard with moving around more often but feels the best today that she has since discharge. Pt progressing well. Pt agreeable to AC follow up. Addressed changes since last contact:  home health care-resolved issues and starting tomorrow   Discussed follow-up appointments.  If no appointment was previously scheduled, appointment scheduling offered: yes   Is follow up appointment scheduled within 7 days of discharge? No.    Follow Up  Future Appointments   Date Time Provider Jalil Llanos   12/2/2022 11:00 AM MD ELIZABETH Hidalgo [facility]   3/3/2023 11:00 AM IVANA Velez [facility]   3/29/2023  9:00 AM Inocencio Jimenez MD [facility]     Non-[facility] follow up appointment(s): n/a     Care Transition Nurse reviewed discharge instructions and red flags with patient and discussed any barriers to care and/or understanding of plan of care after discharge. Discussed appropriate site of care based on symptoms and resources available to patient including: PCP  Specialist  Home health  When to call [address] The patient agrees to contact the PCP office for questions related to their healthcare. Advance Care Planning:   not on file. Patients top risk factors for readmission: functional physical ability and polypharmacy  Interventions to address risk factors: Obtained and reviewed discharge summary and/or continuity of care documents and Education of patient/family/caregiver/guardian to support self-management-activity        Care Transitions Subsequent and Final Call    Schedule Follow Up Appointment with PCP: Completed  Subsequent and Final Calls  Do you have any ongoing symptoms?: No  Have your medications changed?: No  Do you have any questions related to your medications?: No  Do you currently have any active services?: Yes  Are you currently active with any services?: Home Health  Do you have any needs or concerns that I can assist you with?: Yes  Patient-reported Needs or Concerns: HH  Identified Barriers: Time Constraints, Stress  Care Transitions Interventions     Other Services:  (Comment: post-op education)   Other Interventions:             Care Transition Nurse provided contact information for future needs. Plan for follow-up call in 5-7 days based on severity of symptoms and risk factors. Plan for next call: symptom management-pain  HH services? Tasha Polanco RN

## 2022-11-30 ENCOUNTER — CARE COORDINATION (OUTPATIENT)
Dept: OTHER | Facility: CLINIC | Age: 57
End: 2022-11-30

## 2022-11-30 ENCOUNTER — TELEPHONE (OUTPATIENT)
Dept: ORTHOPEDIC SURGERY | Age: 57
End: 2022-11-30

## 2022-11-30 DIAGNOSIS — M54.16 LUMBAR RADICULOPATHY: Primary | ICD-10-CM

## 2022-11-30 RX ORDER — OXYCODONE HYDROCHLORIDE AND ACETAMINOPHEN 5; 325 MG/1; MG/1
1 TABLET ORAL EVERY 6 HOURS PRN
Qty: 28 TABLET | Refills: 0 | Status: SHIPPED | OUTPATIENT
Start: 2022-11-30 | End: 2022-12-07

## 2022-11-30 NOTE — CARE COORDINATION
[facility] Care Transitions Follow Up Call    Care Transition Nurse contacted the patient by telephone to follow up after admission on 2022. Verified name and  with patient as identifiers. Patient: Anibal Mix  Patient : 1965   MRN: [id number]  Reason for Admission: Spondylolisthesis of lumbar region  Discharge Date: 22 RARS: No data recorded    Needs to be reviewed by the provider   Additional needs identified to be addressed with provider: No  None              Method of communication with provider: none. Received call from pt as she reports she has not been seen by [facility] or received a call from [facility]. Spoke to [facility] liaison and issue with staffing but issue resolved and pt scheduled top be seen tomorrow. Contacted pt and she spoke to [facility] and has appointment for visit at 0800 tomorrow. Pt agreeable to [facility] follow up. Addressed changes since last contact:  home health care-coordinating care   Discussed follow-up appointments. If no appointment was previously scheduled, appointment scheduling offered: n/a . Is follow up appointment scheduled within 7 days of discharge? No.    Follow Up  Future Appointments   Date Time Provider Jalil Llanos   2022 11:00 AM Courtney Villarreal MD [facility]   3/3/2023 11:00 AM IVANA Velez [facility]   3/29/2023  9:00 AM Leslie aBum MD [facility]14 [facility]     Non-[facility] follow up appointment(s): n/a     Care Transition Nurse reviewed discharge instructions and red flags with patient and discussed any barriers to care and/or understanding of plan of care after discharge. Discussed appropriate site of care based on symptoms and resources available to patient including: PCP  Specialist  Home health  When to call [address] The patient agrees to contact the PCP office for questions related to their healthcare. Advance Care Planning:   not on file.      Patients top risk factors for readmission: functional physical ability and polypharmacy  Interventions to address risk factors: Obtained and reviewed discharge summary and/or continuity of care documents and Communication with home health agencies or other community services the patient is currently [address] Transitions Subsequent and Final Call    Schedule Follow Up Appointment with PCP: Completed  Subsequent and Final Calls  Do you have any ongoing symptoms?: No  Have your medications changed?: No  Do you have any questions related to your medications?: No  Do you currently have any active services?: Yes  Are you currently active with any services?: Home Health  Do you have any needs or concerns that I can assist you with?: Yes  Patient-reported Needs or Concerns: HH  Identified Barriers: Stress  Care Transitions Interventions     Other Services:  (Comment: post-op education)   Other Interventions:             Care Transition Nurse provided contact information for future needs. Plan for follow-up call in 5-7 days based on severity of symptoms and risk factors. Plan for next call: symptom management-pain  HH services?      Nemo Pena RN

## 2022-11-30 NOTE — TELEPHONE ENCOUNTER
She needs something called in for pain but not as strong as the hydromorphone. She thinks oxycodone or hydrocodone will be better. [facility] is the correct pharmacy.

## 2022-11-30 NOTE — TELEPHONE ENCOUNTER
Sent prescription request to Dr Giacomo Whitmore to authorize and send to patient's pharmacy on file

## 2022-12-02 ENCOUNTER — TELEPHONE (OUTPATIENT)
Dept: ORTHOPEDIC SURGERY | Age: 57
End: 2022-12-02

## 2022-12-02 ENCOUNTER — OFFICE VISIT (OUTPATIENT)
Dept: ORTHOPEDIC SURGERY | Age: 57
End: 2022-12-02

## 2022-12-02 DIAGNOSIS — Z98.1 STATUS POST LUMBAR SPINAL ARTHRODESIS: Primary | ICD-10-CM

## 2022-12-02 NOTE — TELEPHONE ENCOUNTER
She has a follow up at 11:00 and her notes says she will return to work today. I told her I was sure that note will be redone at her follow up at 11:00 and that she wouldn't be returning today. If different, please let her know.

## 2022-12-02 NOTE — TELEPHONE ENCOUNTER
Labs drawn with IV insertion and held   Received the fmla source form completed/signed faxed to # [phone] and received the [facility] fmla form completed/signed faxed to # [phone] 12/2/22,MB

## 2022-12-02 NOTE — PROGRESS NOTES
Name: Lissette Fong  YOB: 1965  Gender: female  MRN: [id number]  Age: 62 y.o. Chief Complaint: Lumbar spine surgery follow up    History of Present Illness:      Lissette Fong  is here for 2-week follow up of her  lumbar TLIF surgery. She reports significant relief of preoperative lower extremity pain, weakness and parasthesias. There is the expected residual back stiffness. Medications:       Current Outpatient Medications:     oxyCODONE-acetaminophen (PERCOCET) 5-325 MG per tablet, Take 1 tablet by mouth every 6 hours as needed for Pain for up to 7 days. Intended supply: 7 days. Take lowest dose possible to manage pain, Disp: 28 tablet, Rfl: 0    ondansetron (ZOFRAN) 4 MG tablet, Take 4 mg by mouth every 8 hours as needed for Nausea or Vomiting (Patient not taking: Reported on 11/22/2022), Disp: , Rfl:     butalbital-acetaminophen-caffeine (FIORICET, ESGIC) -40 MG per tablet, Take 1 tablet by mouth 2 times daily as needed for Headaches (Patient not taking: No sig reported), Disp: 30 tablet, Rfl: 0    traZODone (DESYREL) 50 MG tablet, Take 1 tablet by mouth nightly as needed for Sleep Take 1/2 to 1 tablet at bedtime as needed for sleep. (Patient not taking: No sig reported), Disp: 30 tablet, Rfl: 0    gabapentin (NEURONTIN) 100 MG capsule, Take 100 mg by mouth as needed. (Patient not taking: No sig reported), Disp: , Rfl:     traMADol (ULTRAM) 50 MG tablet, Take 50 mg by mouth as needed.  (Patient not taking: No sig reported), Disp: , Rfl:     escitalopram (LEXAPRO) 10 MG tablet, Take 10 mg by mouth daily (Patient not taking: No sig reported), Disp: , Rfl:     lisinopril (PRINIVIL;ZESTRIL) 5 MG tablet, Take 5 mg by mouth daily (Patient not taking: No sig reported), Disp: , Rfl:     Allergies:  No Known Allergies      Physical Exam:      Respirations are unlabored and there is no evidence of cyanosis      Wound is healed nicely without erythema, drainage or underlying fluctuance    Gait is improved    Sensory testing reveals intact sensation to light touch and in the distribution of the L3-S1 dermatomes bilaterally. Strength testing in the lower extremity reveals the following based on the 5 point grading scale:       HF (L2) H Ab (L5) KE (L3/4) ADF (L4) EHL (L5) A Ev (S1) APF (S1)   Right 5 5 5 5 5 5 5   Left 5 5 5 5 5 5 5        Radiographic Studies:     X-rays including AP and lateral views of the lumbar spine were reviewed and interpreted:     Postoperative changes are noted status post lumbar fusion with instrumentation. The hardware appears to be well-placed and intact. There is no evidence of significant osteolysis. No significant adjacent level decompensation. Alignment is maintained. Radiographic Impression:    Appropriate postoperative changes status post lumbar laminectomy and fusion without evidence for hardware failure or decompensation. Diagnosis:      ICD-10-CM    1. Status post lumbar spinal arthrodesis  Z98.1           Assessment/Plan: This patient is following the expected course following the spine surgery. I encouraged her to walk as much as possible for exercise. She can also begin a stationary bike, or other low impact aerobic exercise. We will limit lifting to 10 pounds for the next several weeks. Prolonged sitting should be avoided to minimize the strain on the lumbar area. Thereafter, activities will be gradually increased as tolerated with the exception of heavy lifting. I will have her return for follow up in about 6 weeks. In the meantime, she is going to remain out of work until February 5, 2023 and then I anticipate return to work on February 6, 2023 without restriction. Mike Chakraborty MD    12/02/22  11:39 AM

## 2022-12-02 NOTE — LETTER
[facility]  [address] [facility]. [place] [address]  Phone: [phone]  Fax: [phone]    Lyly Puri MD        December 2, 2022     Patient: Freedom Sainz   YOB: 1965   Date of Visit: 12/2/2022       To Whom It May Concern: It is my medical opinion that Cat Hung will remain off work until 2/05/2023 and return to work on 2/06/2023. If you have any questions or concerns, please don't hesitate to call.     Sincerely,        Lyly Puri MD

## 2022-12-05 ENCOUNTER — CARE COORDINATION (OUTPATIENT)
Dept: OTHER | Facility: CLINIC | Age: 57
End: 2022-12-05

## 2022-12-05 NOTE — CARE COORDINATION
[facility] Care Transitions Follow Up Call    Patient: Fidel Mak  Patient : 1965   MRN: [id number]  Reason for Admission: Spondylolisthesis of lumbar region  Discharge Date: 22 RARS: No data recorded    ACM attempted to reach patient for follow up call regarding Care Transitions follow up. HIPAA compliant message left requesting a return phone call at patients convenience. Will continue to follow.        Follow Up  Future Appointments   Date Time Provider Jalil Llanos   3/3/2023 11:00 AM IVANA Velez [facility] AM[facility]   3/29/2023  9:00 AM Jerzy Nicolas MD [facility]14 [facility]     Non-[facility] follow up appointment(s): n/a       Arlene Solis RN

## 2022-12-12 ENCOUNTER — CARE COORDINATION (OUTPATIENT)
Dept: OTHER | Facility: CLINIC | Age: 57
End: 2022-12-12

## 2022-12-12 NOTE — CARE COORDINATION
[facility] Care Transitions Follow Up Call      Patient: Bharat Damon  Patient : 1965   MRN: [id number]  Reason for Admission: Spondylolisthesis of lumbar region  Discharge Date: 22 RARS: No data recorded    ACM attempted 2nd outreach to reach patient for Care Transitions. HIPAA compliant message left requesting a return phone call at patients convenience. Unable to Reach Letter sent to patient via [employer]. Chart reviewed. Pt completed post-op follow up on 2022. Pt still had some residual back stiffness. Pt will be limit lifting to 10 pounds,  no prolonged sitting, and activities will be gradually increased as tolerated with the exception of heavy lifting. Pt will follow up with surgeon in 6 weeks. If all goes as planned pt will return to work 2023. Unable to view how HH is going hoping to get update on next attempt to pt. Will continue to outreach patient.        Follow Up  Future Appointments   Date Time Provider Jalil Llanos   3/3/2023 11:00 AM IVANA Velez [facility] AM[facility]   3/29/2023  9:00 AM Manish Taylor MD [facility]14 [facility] AMB     Non-[facility] follow up appointment(s): n/a     Alexandre Galicia RN

## 2022-12-21 ENCOUNTER — TELEPHONE (OUTPATIENT)
Dept: ORTHOPEDIC SURGERY | Age: 57
End: 2022-12-21

## 2022-12-21 ENCOUNTER — CARE COORDINATION (OUTPATIENT)
Dept: OTHER | Facility: CLINIC | Age: 57
End: 2022-12-21

## 2022-12-21 DIAGNOSIS — Z98.1 STATUS POST LUMBAR SPINAL ARTHRODESIS: Primary | ICD-10-CM

## 2022-12-21 NOTE — CARE COORDINATION
[facility] Care Transitions Follow Up Call    Care Transition Nurse contacted the patient by telephone to follow up after admission on 2022. Verified name and  with patient as identifiers. Patient: Maricruz Michael  Patient : 1965   MRN: [id number]  Reason for Admission: Spondylolisthesis of lumbar region  Discharge Date: 22 RARS: No data recorded    Needs to be reviewed by the provider   Additional needs identified to be addressed with provider: Yes  PT-Outpatient   OT-Outpatient              Method of communication with provider: phone. Able to reach pt. Pt reports she is doing well. Pt following post-op instructions. Pt does reports HH has only seen her once. Pt reports issues with scheduling due to family matters. Pt reports being released by [facility] but unable to make contact with [facility]. After discussing, pt would like to start Outpatient Therapy at [facility]. [address] and staff reports referral will be made. No further outreach scheduled with this ACM, ACM will sign off care team at this time. Episode of Care resolved. Patient has this ACM's contact information if future needs arise. Addressed changes since last contact:  PT-need  OT-need  Discussed follow-up appointments. If no appointment was previously scheduled, appointment scheduling offered: Yes. Is follow up appointment scheduled within 7 days of discharge? No.    Follow Up  Future Appointments   Date Time Provider Jalil Llanos   3/3/2023 11:00 AM IVANA Velez [facility]   3/29/2023  9:00 AM [facility]     Non-[facility] follow up appointment(s): n/a    Care Transition Nurse reviewed discharge instructions and red flags with patient and discussed any barriers to care and/or understanding of plan of care after discharge.  Discussed appropriate site of care based on symptoms and resources available to patient including: PCP  Specialist  Urgent care clinics  When to call [address] The patient agrees to contact the PCP office for questions related to their healthcare. Advance Care Planning:   not on file. Patients top risk factors for readmission: functional physical ability  Interventions to address risk factors: Obtained and reviewed discharge summary and/or continuity of care documents and made MD aware of need for outpatient therapy      Care Transitions Subsequent and Final Call    Schedule Follow Up Appointment with PCP: Completed  Subsequent and Final Calls  Do you have any ongoing symptoms?: No  Have your medications changed?: No  Do you have any questions related to your medications?: No  Do you currently have any active services?: Yes  Are you currently active with any services?: Home Health  Do you have any needs or concerns that I can assist you with?: Yes  Patient-reported Needs or Concerns: Outpatient therapy  Identified Barriers: None  Care Transitions Interventions     Other Services: Completed (Comment: post-op education)   Other Interventions:             Care Transition Nurse provided contact information for future needs. No further follow-up call indicated based on severity of symptoms and risk factors.     Alexandre Galicia RN

## 2022-12-22 ENCOUNTER — TELEPHONE (OUTPATIENT)
Dept: ORTHOPEDIC SURGERY | Age: 57
End: 2022-12-22

## 2022-12-27 ENCOUNTER — TELEPHONE (OUTPATIENT)
Dept: ORTHOPEDIC SURGERY | Age: 57
End: 2022-12-27

## 2022-12-27 DIAGNOSIS — Z98.1 STATUS POST LUMBAR SPINAL ARTHRODESIS: Primary | ICD-10-CM

## 2022-12-27 DIAGNOSIS — G44.52 NEW DAILY PERSISTENT HEADACHE: ICD-10-CM

## 2022-12-27 RX ORDER — BUTALBITAL, ACETAMINOPHEN AND CAFFEINE 50; 325; 40 MG/1; MG/1; MG/1
1 TABLET ORAL 2 TIMES DAILY PRN
Qty: 30 TABLET | Refills: 0 | OUTPATIENT
Start: 2022-12-27

## 2022-12-27 NOTE — TELEPHONE ENCOUNTER
She is requesting something for pain. She has had percocet in the past. Please let her know.  I have confirmed her pharmacy

## 2022-12-28 NOTE — TELEPHONE ENCOUNTER
Let patient know that we have requested medication refill and are waiting for approval.  Will let her know when and if approved by provider.

## 2022-12-28 NOTE — TELEPHONE ENCOUNTER
Sent prescription request to [facility] to address in [place] espino. She is requesting prescription refill for percocet.

## 2022-12-29 ENCOUNTER — TELEPHONE (OUTPATIENT)
Dept: ORTHOPEDIC SURGERY | Age: 57
End: 2022-12-29

## 2022-12-29 RX ORDER — NALOXONE HYDROCHLORIDE 4 MG/.1ML
1 SPRAY NASAL PRN
Qty: 1 EACH | Refills: 0 | Status: SHIPPED | OUTPATIENT
Start: 2022-12-29

## 2022-12-29 RX ORDER — OXYCODONE HYDROCHLORIDE AND ACETAMINOPHEN 5; 325 MG/1; MG/1
1 TABLET ORAL EVERY 6 HOURS PRN
Qty: 20 TABLET | Refills: 0 | Status: SHIPPED | OUTPATIENT
Start: 2022-12-29 | End: 2023-01-03

## 2022-12-29 NOTE — TELEPHONE ENCOUNTER
She is calling to follow up on the rx. I told her it was sent this morning so she will check with her pharmacy.

## 2023-01-03 ENCOUNTER — TELEPHONE (OUTPATIENT)
Dept: FAMILY MEDICINE CLINIC | Facility: CLINIC | Age: 58
End: 2023-01-03

## 2023-01-03 ENCOUNTER — HOSPITAL ENCOUNTER (OUTPATIENT)
Dept: PHYSICAL THERAPY | Age: 58
Setting detail: RECURRING SERIES
End: 2023-01-03
Payer: COMMERCIAL

## 2023-01-03 DIAGNOSIS — U07.1 COVID-19: Primary | ICD-10-CM

## 2023-01-03 NOTE — TELEPHONE ENCOUNTER
Patient tested + for COVID yesterday, 1/2/23, after c/o HA & 's exposure to the virus. She states that her temp will stay below 100, only if she takes Tylenol q4h. No other symptoms at present time. Patient requesting that a medication be sent it. States that when she had COVID in 2021, she was hospitalized & is trying to prevent that from reoccurring. Medications confirmed with patient & she has no known drug allergies. States that she uses [address] [place] [address] on [facility] in [place], but if RX needs to be sent elsewhere, she would like for it to be sent to [facility] on [address]

## 2023-01-03 NOTE — TELEPHONE ENCOUNTER
Spoke to patient, notifying that RX has been sent in to [facility]. Patient verbalized understanding & thanked.

## 2023-01-04 ENCOUNTER — TELEMEDICINE (OUTPATIENT)
Dept: FAMILY MEDICINE CLINIC | Facility: CLINIC | Age: 58
End: 2023-01-04
Payer: COMMERCIAL

## 2023-01-04 DIAGNOSIS — U07.1 COVID-19: Primary | ICD-10-CM

## 2023-01-04 PROCEDURE — 99213 OFFICE O/P EST LOW 20 MIN: CPT | Performed by: NURSE PRACTITIONER

## 2023-01-04 SDOH — ECONOMIC STABILITY: FOOD INSECURITY: WITHIN THE PAST 12 MONTHS, YOU WORRIED THAT YOUR FOOD WOULD RUN OUT BEFORE YOU GOT MONEY TO BUY MORE.: NEVER TRUE

## 2023-01-04 SDOH — ECONOMIC STABILITY: FOOD INSECURITY: WITHIN THE PAST 12 MONTHS, THE FOOD YOU BOUGHT JUST DIDN'T LAST AND YOU DIDN'T HAVE MONEY TO GET MORE.: NEVER TRUE

## 2023-01-04 ASSESSMENT — PATIENT HEALTH QUESTIONNAIRE - PHQ9
SUM OF ALL RESPONSES TO PHQ QUESTIONS 1-9: 0
SUM OF ALL RESPONSES TO PHQ QUESTIONS 1-9: 0
5. POOR APPETITE OR OVEREATING: 0
SUM OF ALL RESPONSES TO PHQ9 QUESTIONS 1 & 2: 0
7. TROUBLE CONCENTRATING ON THINGS, SUCH AS READING THE NEWSPAPER OR WATCHING TELEVISION: 0
SUM OF ALL RESPONSES TO PHQ QUESTIONS 1-9: 0
9. THOUGHTS THAT YOU WOULD BE BETTER OFF DEAD, OR OF HURTING YOURSELF: 0
3. TROUBLE FALLING OR STAYING ASLEEP: 0
6. FEELING BAD ABOUT YOURSELF - OR THAT YOU ARE A FAILURE OR HAVE LET YOURSELF OR YOUR FAMILY DOWN: 0
4. FEELING TIRED OR HAVING LITTLE ENERGY: 0
10. IF YOU CHECKED OFF ANY PROBLEMS, HOW DIFFICULT HAVE THESE PROBLEMS MADE IT FOR YOU TO DO YOUR WORK, TAKE CARE OF THINGS AT HOME, OR GET ALONG WITH OTHER PEOPLE: 0
SUM OF ALL RESPONSES TO PHQ QUESTIONS 1-9: 0
1. LITTLE INTEREST OR PLEASURE IN DOING THINGS: 0
8. MOVING OR SPEAKING SO SLOWLY THAT OTHER PEOPLE COULD HAVE NOTICED. OR THE OPPOSITE, BEING SO FIGETY OR RESTLESS THAT YOU HAVE BEEN MOVING AROUND A LOT MORE THAN USUAL: 0
2. FEELING DOWN, DEPRESSED OR HOPELESS: 0

## 2023-01-04 ASSESSMENT — ENCOUNTER SYMPTOMS
WHEEZING: 0
SORE THROAT: 0
SINUS PRESSURE: 0
NAUSEA: 0
COUGH: 1
DIARRHEA: 0
RHINORRHEA: 0
SHORTNESS OF BREATH: 0
SINUS PAIN: 0
VOMITING: 0

## 2023-01-04 ASSESSMENT — SOCIAL DETERMINANTS OF HEALTH (SDOH): HOW HARD IS IT FOR YOU TO PAY FOR THE VERY BASICS LIKE FOOD, HOUSING, MEDICAL CARE, AND HEATING?: NOT HARD AT ALL
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
## 2023-01-09 ENCOUNTER — HOSPITAL ENCOUNTER (OUTPATIENT)
Dept: PHYSICAL THERAPY | Age: 58
Setting detail: RECURRING SERIES
Discharge: HOME | End: 2023-01-12
Payer: COMMERCIAL

## 2023-01-09 PROCEDURE — 97161 PT EVAL LOW COMPLEX 20 MIN: CPT

## 2023-01-09 PROCEDURE — 97110 THERAPEUTIC EXERCISES: CPT

## 2023-01-09 ASSESSMENT — PAIN SCALES - GENERAL: PAINLEVEL_OUTOF10: 0

## 2023-01-09 NOTE — PROGRESS NOTES
Chucho Kras  : 1965  Primary: [employer] [facility] [employer] ([employer] [facility])  Secondary:  [address] @ [address] [place] [address] John Toscano 14 [address]  Phone: [phone]  Fax: [phone] Plan Frequency: 1-2 per week for 8 weeks  Plan of Care/Certification Expiration Date: 03/10/23    PT Visit Info:  Plan Frequency: 1-2 per week for 8 weeks  Plan of Care/Certification Expiration Date: 03/10/23    Visit Count:  1    OUTPATIENT PHYSICAL THERAPY:OP NOTE TYPE: Treatment Note 2023       Episode  }Appt Desk             Treatment Diagnosis:  Difficulty in walking, Not elsewhere classified (R26.2)  Low Back Pain (M54.5)  Medical/Referring Diagnosis:  Arthrodesis status [Z98.1]  Referring Physician:  Edgardo Cedeño MD MD Orders:  PT Eval and Treat   Date of Onset:  No data recorded   Allergies:   Patient has no known allergies. Restrictions/Precautions:  No data recordedSpinal Precautions: No Bending; No Lifting; No Twisting     Interventions Planned (Treatment may consist of any combination of the following):    Current Treatment Recommendations: Strengthening; ROM; Functional mobility training; Neuromuscular re-education; Manual; Therapeutic activities; Dry needling; Modalities; Patient/Caregiver education & training; Home exercise program     Subjective Comments:  See evaluation note for details. Initial:}    0/10Post Session:       0/10  Medications Last Reviewed:  2023  Updated Objective Findings:  See evaluation note from today  Treatment   THERAPEUTIC EXERCISE: (25 minutes):    Exercises per grid below to improve mobility and strength. Required minimal verbal cues to promote proper body alignment and promote proper body mechanics. Progressed repetitions as indicated.    Date:  23 Date:   Date:     Activity/Exercise Parameters Parameters Parameters   Glute sets 2 x 10     Posterior pelvic tilts 2 x 10     Heel raises 2 x 10     Toe raises Seated, 2 x 10     PROM Therapist-performed to B LE     Ankle 3-way Green band,   R foot 3 x 10              HEP: Patient received a handout for the exercises above. Treatment/Session Summary:    Treatment Assessment:  Good performance with exercises. Does demonstrate R ankle weakness with all motions. R LE generally weaker than L. Communication/Consultation:  None today  Equipment provided today:  None  Recommendations/Intent for next treatment session: Next visit will focus on improving R LE strength, improving trunk strength and improved mobility performance.     Total Treatment Billable Duration:  25 minutes of therex + 20 minutes for evaluation  Time In: 1600  Time Out: 1645    Khris Benavides, PT       Charge Capture  }Post Session Pain  PT Visit Info  [employer] Portal  MD Guidelines  Scanned Media  Benefits  MyChart    Future Appointments   Date Time Provider Jalil Llanos   1/10/2023  3:30 PM Juan J Hernandez, PT [facility]   3/3/2023 11:00 AM IVANA Velez [facility]   3/29/2023  9:00 AM Feli Arnold MD [id number]
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
## 2023-01-19 ENCOUNTER — HOSPITAL ENCOUNTER (OUTPATIENT)
Dept: PHYSICAL THERAPY | Age: 58
Setting detail: RECURRING SERIES
End: 2023-01-19
Payer: COMMERCIAL

## 2023-01-20 ENCOUNTER — HOSPITAL ENCOUNTER (OUTPATIENT)
Dept: PHYSICAL THERAPY | Age: 58
Setting detail: RECURRING SERIES
Discharge: HOME | End: 2023-01-23
Payer: COMMERCIAL

## 2023-01-20 PROCEDURE — 97140 MANUAL THERAPY 1/> REGIONS: CPT

## 2023-01-20 PROCEDURE — 97110 THERAPEUTIC EXERCISES: CPT

## 2023-01-20 ASSESSMENT — PAIN SCALES - GENERAL: PAINLEVEL_OUTOF10: 0

## 2023-01-20 NOTE — PROGRESS NOTES
Erickblas Hope  : 1965  Primary: Kristina Joseph [employer] [employer] [facility] [employer] ([employer] [facility])  Secondary:  [address] @ [address] [place] [address]  Phone: [phone]  Fax: [phone] Plan Frequency: 1-2 per week for 8 weeks  Plan of Care/Certification Expiration Date: 03/10/23      PT Visit Info:  Plan Frequency: 1-2 per week for 8 weeks  Plan of Care/Certification Expiration Date: 03/10/23      Visit Count:  2    OUTPATIENT PHYSICAL THERAPY:OP NOTE TYPE: Treatment Note 2023       Episode  }Appt Desk             Treatment Diagnosis:  Difficulty in walking, Not elsewhere classified (R26.2)  Low Back Pain (M54.5)  Medical/Referring Diagnosis:  Arthrodesis status [Z98.1]  Referring Physician:  Marcia Segovia MD MD Orders:  PT Eval and Treat   Date of Onset:  No data recorded   Allergies:   Patient has no known allergies. Restrictions/Precautions:  No data recordedSpinal Precautions: No Bending; No Lifting; No Twisting     Interventions Planned (Treatment may consist of any combination of the following):    Current Treatment Recommendations: Strengthening; ROM; Functional mobility training; Neuromuscular re-education; Manual; Therapeutic activities; Dry needling; Modalities; Patient/Caregiver education & training; Home exercise program     Subjective Comments:  Pt. reported no pain today  Initial:}    0/10Post Session:       0/10  Medications Last Reviewed:  2023  Updated Objective Findings:  See evaluation note from today  Treatment   THERAPEUTIC EXERCISE: (40 minutes):    Exercises per grid below to improve mobility and strength. Required minimal verbal cues to promote proper body alignment and promote proper body mechanics. Manual therapy:x 15 mins  Pt. Received soft tissue mobilization to bilateral lumbosacral paraspinals to decrease pain and tightness in bilateral low back in prone with pillows to support head, stomach, & feet. Progressed repetitions as indicated. Date:  1/9/23 Date:  1/20/23 Date:     Activity/Exercise Parameters Parameters Parameters   Glute sets 2 x 10 2x10 reps 5 sec hold each     Posterior pelvic tilts 2 x 10 2x10 reps 5 sec hold each     Standing hip abduction   X 20 reps BLE's with TA    Standing hip flexion  X 20 reps BLE's with TA    Heel raises 2 x 10 2x10 reps seated     Standing hamstring curls  X 20 reps BLE's with TA    Toe raises Seated, 2 x 10 2x10 reps seated     PROM Therapist-performed to B LE PTA performed with right LE only     Ankle 3-way Green band,   R foot 3 x 10  3x10 reps in all 4 planes with yellow band     Hip adduction   Supine in hooklying with yellow ball x 10 reps x 10 sec hold each      HEP: Patient received a handout for the exercises above. Treatment/Session Summary:    Treatment Assessment:Pt. Was compliant with all exercises and reported no pain at the end of session and less tightness in bilateral low back. Communication/Consultation:  None today  Equipment provided today:  None  Recommendations/Intent for next treatment session: Next visit will focus on improving R LE strength, improving trunk strength and improved mobility performance.     Total Treatment Billable Duration:  55 mins  Time In: 9534  Time Out: R Dre PURVIS PTA       Charge Capture  }Post Session Pain  PT Visit Info  [employer] Portal  MD Guidelines  Scanned Media  Benefits  MyChart    Future Appointments   Date Time Provider Jalil Llanos   1/24/2023  9:00 AM Delmar Miller PTA [facility]   3/3/2023 11:00 AM IVANA Velez [facility]   3/29/2023  9:00 AM Delmar Colbert MD [id number]

## 2023-01-24 ENCOUNTER — HOSPITAL ENCOUNTER (OUTPATIENT)
Dept: PHYSICAL THERAPY | Age: 58
Setting detail: RECURRING SERIES
End: 2023-01-24
Payer: COMMERCIAL

## 2023-01-30 ENCOUNTER — TELEPHONE (OUTPATIENT)
Dept: ORTHOPEDIC SURGERY | Age: 58
End: 2023-01-30

## 2023-01-30 NOTE — TELEPHONE ENCOUNTER
She called back with a fax # [phone], [facility]    REF # [id number]

## 2023-01-30 NOTE — TELEPHONE ENCOUNTER
Anjum has her going back to work on Feb 5. She needs a note stating she is still in PT and will be released to return to work on Feb 15. Please call if you have questions. She will call back with a fax # to send it to.

## 2023-01-31 ENCOUNTER — HOSPITAL ENCOUNTER (OUTPATIENT)
Dept: PHYSICAL THERAPY | Age: 58
Setting detail: RECURRING SERIES
Discharge: HOME | End: 2023-02-03
Payer: COMMERCIAL

## 2023-01-31 PROCEDURE — 97110 THERAPEUTIC EXERCISES: CPT

## 2023-01-31 PROCEDURE — 97140 MANUAL THERAPY 1/> REGIONS: CPT

## 2023-01-31 ASSESSMENT — PAIN SCALES - GENERAL: PAINLEVEL_OUTOF10: 5

## 2023-01-31 NOTE — PROGRESS NOTES
Ekaterina Cornelius  : 1965  Primary: [employer] [facility] [employer] ([facility])  Secondary:  [address] @ [address] [place] [id number]  Phone: [phone]  Fax: [phone] Plan Frequency: 1-2 per week for 8 weeks  Plan of Care/Certification Expiration Date: 03/10/23      PT Visit Info:  Plan Frequency: 1-2 per week for 8 weeks  Plan of Care/Certification Expiration Date: 03/10/23      Visit Count:  3    OUTPATIENT PHYSICAL THERAPY:OP NOTE TYPE: Treatment Note 2023       Episode  }Appt Desk             Treatment Diagnosis:  Difficulty in walking, Not elsewhere classified (R26.2)  Low Back Pain (M54.5)  Medical/Referring Diagnosis:  Arthrodesis status [Z98.1]  Referring Physician:  Matt Gr MD MD Orders:  PT Eval and Treat   Date of Onset:  No data recorded   Allergies:   Patient has no known allergies. Restrictions/Precautions:  No data recordedSpinal Precautions: No Bending; No Lifting; No Twisting     Interventions Planned (Treatment may consist of any combination of the following):    Current Treatment Recommendations: Strengthening; ROM; Functional mobility training; Neuromuscular re-education; Manual; Therapeutic activities; Dry needling; Modalities; Patient/Caregiver education & training; Home exercise program     Subjective Comments:  Patient reports increased back today after holding and carrying her 2 and 1 yr old grand children  Initial:}    5/10Post Session:       3/10  Medications Last Reviewed:  2023  Updated Objective Findings:   Increased back pain  Treatment   THERAPEUTIC EXERCISE: (23 minutes):    Exercises per grid below to improve mobility and strength. Required minimal verbal cues to promote proper body alignment and promote proper body mechanics. Manual therapy:x 30 mins  Pt.  Received soft tissue mobilization to bilateral lumbosacral paraspinals to decrease pain and tightness right side lying Progressed repetitions as indicated. Date:  1/9/23 Date:  1/20/23 Date:  1/31/23   Activity/Exercise Parameters Parameters Parameters   Glute sets 2 x 10 2x10 reps 5 sec hold each  3 x 10   Posterior pelvic tilts 2 x 10 2x10 reps 5 sec hold each  3 x 10   Standing hip abduction   X 20 reps BLE's with TA Supine hook lying red band 3 x 15 TA   Standing hip flexion  X 20 reps BLE's with TA    Heel raises 2 x 10 2x10 reps seated     Standing hamstring curls  X 20 reps BLE's with TA    Toe raises Seated, 2 x 10 2x10 reps seated     PROM Therapist-performed to B LE PTA performed with right LE only     Ankle 3-way Green band,   R foot 3 x 10  3x10 reps in all 4 planes with yellow band     Hip adduction   Supine in hooklying with yellow ball x 10 reps x 10 sec hold each    Calf stretch   Strap 10 x 10 sec seated   Transverse abdominus activation TA   Multiple reps and with some exercises     HEP: Patient received a handout for the exercises above. Treatment/Session Summary:    Treatment Assessment:   Patient reports feeling much better after treatment. Communication/Consultation:  None today  Equipment provided today:  None  Recommendations/Intent for next treatment session: Next visit will focus on improving R LE strength, improving trunk strength and improved mobility performance.     Total Treatment Billable Duration:  53 mins  Time In: 6179  Time Out: 1624    PAMELLA LEW PTA       Charge Capture  }Post Session Pain  PT Visit Info  [employer] Portal  MD Guidelines  Scanned Media  Benefits  MyChart    Future Appointments   Date Time Provider Jalil Llanos   3/3/2023 11:00 AM IVANA Velez [id number]   3/29/2023  9:00 AM Alicia Bowser MD [id number]

## 2023-02-15 ENCOUNTER — TELEPHONE (OUTPATIENT)
Dept: ORTHOPEDIC SURGERY | Age: 58
End: 2023-02-15

## 2023-02-24 ENCOUNTER — TELEPHONE (OUTPATIENT)
Dept: ORTHOPEDIC SURGERY | Age: 58
End: 2023-02-24

## 2023-02-24 NOTE — TELEPHONE ENCOUNTER
Form faxed in to [employer], Full Return to work form faxed in to [employer] and Workability/RTW Clearance form faxed in to [employer]. Copies sent to scanning.

## 2023-02-27 ENCOUNTER — TELEPHONE (OUTPATIENT)
Dept: ORTHOPEDIC SURGERY | Age: 58
End: 2023-02-27

## 2023-02-27 DIAGNOSIS — Z98.1 STATUS POST LUMBAR SPINAL ARTHRODESIS: Primary | ICD-10-CM

## 2023-02-27 RX ORDER — OXYCODONE HYDROCHLORIDE AND ACETAMINOPHEN 5; 325 MG/1; MG/1
1 TABLET ORAL EVERY 6 HOURS PRN
Qty: 28 TABLET | Refills: 0 | Status: SHIPPED | OUTPATIENT
Start: 2023-02-27 | End: 2023-03-06

## 2023-02-27 NOTE — TELEPHONE ENCOUNTER
She needs to speak to someone about her work status. She isn't quite ready to go back. She also needs a refill of pain medicine.

## 2023-02-28 ENCOUNTER — TELEPHONE (OUTPATIENT)
Dept: ORTHOPEDIC SURGERY | Age: 58
End: 2023-02-28

## 2023-02-28 NOTE — TELEPHONE ENCOUNTER
She got her prescription yesterday but she is still wanting to speak with Sunil Johnson. Please call.

## 2023-02-28 NOTE — TELEPHONE ENCOUNTER
She was just speaking with Daryn Bojorquez and scheduled an appointment for Thursday but she has another appointment at that same time. She is wondering if there is something else later in the day.

## 2023-02-28 NOTE — TELEPHONE ENCOUNTER
She thinks she returned to work to soon. She thinks that she may need additional therapy.  I have arranged an appointment on Thursday at 10 for her to come into the office and discuss with Dr Alfnoso Arauz

## 2023-03-01 ENCOUNTER — TELEPHONE (OUTPATIENT)
Dept: ORTHOPEDIC SURGERY | Age: 58
End: 2023-03-01

## 2023-03-01 NOTE — TELEPHONE ENCOUNTER
Pt had to cancel for  tomorrow  because  she  had another  conflicting  appt    She  ask can you see her  sooner  than 3/20 (where I  r/s her)  I believe she is coming in  to discuss returning to work

## 2023-03-02 ENCOUNTER — TELEPHONE (OUTPATIENT)
Dept: ORTHOPEDIC SURGERY | Age: 58
End: 2023-03-02

## 2023-03-03 ENCOUNTER — OFFICE VISIT (OUTPATIENT)
Dept: BEHAVIORAL/MENTAL HEALTH CLINIC | Facility: CLINIC | Age: 58
End: 2023-03-03

## 2023-03-03 DIAGNOSIS — F32.1 CURRENT MODERATE EPISODE OF MAJOR DEPRESSIVE DISORDER WITHOUT PRIOR EPISODE (HCC): Primary | ICD-10-CM

## 2023-03-03 ASSESSMENT — ANXIETY QUESTIONNAIRES
GAD7 TOTAL SCORE: 9
3. WORRYING TOO MUCH ABOUT DIFFERENT THINGS: 2
5. BEING SO RESTLESS THAT IT IS HARD TO SIT STILL: 1
7. FEELING AFRAID AS IF SOMETHING AWFUL MIGHT HAPPEN: 0
4. TROUBLE RELAXING: 2
6. BECOMING EASILY ANNOYED OR IRRITABLE: 1
2. NOT BEING ABLE TO STOP OR CONTROL WORRYING: 2
IF YOU CHECKED OFF ANY PROBLEMS ON THIS QUESTIONNAIRE, HOW DIFFICULT HAVE THESE PROBLEMS MADE IT FOR YOU TO DO YOUR WORK, TAKE CARE OF THINGS AT HOME, OR GET ALONG WITH OTHER PEOPLE: SOMEWHAT DIFFICULT
1. FEELING NERVOUS, ANXIOUS, OR ON EDGE: 1

## 2023-03-03 ASSESSMENT — PATIENT HEALTH QUESTIONNAIRE - PHQ9
SUM OF ALL RESPONSES TO PHQ9 QUESTIONS 1 & 2: 3
SUM OF ALL RESPONSES TO PHQ QUESTIONS 1-9: 13
2. FEELING DOWN, DEPRESSED OR HOPELESS: 2
SUM OF ALL RESPONSES TO PHQ QUESTIONS 1-9: 13
8. MOVING OR SPEAKING SO SLOWLY THAT OTHER PEOPLE COULD HAVE NOTICED. OR THE OPPOSITE, BEING SO FIGETY OR RESTLESS THAT YOU HAVE BEEN MOVING AROUND A LOT MORE THAN USUAL: 0
6. FEELING BAD ABOUT YOURSELF - OR THAT YOU ARE A FAILURE OR HAVE LET YOURSELF OR YOUR FAMILY DOWN: 3
1. LITTLE INTEREST OR PLEASURE IN DOING THINGS: 1
5. POOR APPETITE OR OVEREATING: 2
SUM OF ALL RESPONSES TO PHQ QUESTIONS 1-9: 13
3. TROUBLE FALLING OR STAYING ASLEEP: 3
7. TROUBLE CONCENTRATING ON THINGS, SUCH AS READING THE NEWSPAPER OR WATCHING TELEVISION: 0
10. IF YOU CHECKED OFF ANY PROBLEMS, HOW DIFFICULT HAVE THESE PROBLEMS MADE IT FOR YOU TO DO YOUR WORK, TAKE CARE OF THINGS AT HOME, OR GET ALONG WITH OTHER PEOPLE: 1
SUM OF ALL RESPONSES TO PHQ QUESTIONS 1-9: 13
9. THOUGHTS THAT YOU WOULD BE BETTER OFF DEAD, OR OF HURTING YOURSELF: 0
4. FEELING TIRED OR HAVING LITTLE ENERGY: 2
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
## 2023-03-06 ENCOUNTER — OFFICE VISIT (OUTPATIENT)
Dept: ORTHOPEDIC SURGERY | Age: 58
End: 2023-03-06

## 2023-03-06 DIAGNOSIS — Z98.1 STATUS POST LUMBAR SPINAL ARTHRODESIS: Primary | ICD-10-CM

## 2023-03-06 PROCEDURE — 99024 POSTOP FOLLOW-UP VISIT: CPT | Performed by: ORTHOPAEDIC SURGERY

## 2023-03-06 NOTE — LETTER
March 6, 2023       Ariela Burton YOB: 1965   [address] [place] [address] Date of Visit:  3/6/2023       To Whom It May Concern: It is my medical opinion that Carmelita Matthew will remain out of work 2/23/2023 - 5/1/2023  If you have any questions or concerns, please don't hesitate to call.     Sincerely,        Tracie Barboza MD

## 2023-03-06 NOTE — PROGRESS NOTES
Name: Pedro Pierson  YOB: 1965  Gender: female  MRN: [id number]  Age: 62 y.o. Chief Complaint: Lumbar spine surgery follow up    History of Present Illness:      Pedro Pierson  is here for 4-month follow up of her  lumbar TLIF surgery. She reports some relief of preoperative lower extremity pain, weakness and parasthesias. There is the expected residual back stiffness. 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 She would like to do some physical therapy. Medications:       Current Outpatient Medications:     oxyCODONE-acetaminophen (PERCOCET) 5-325 MG per tablet, Take 1 tablet by mouth every 6 hours as needed for Pain for up to 7 days. Intended supply: 7 days. Take lowest dose possible to manage pain Max Daily Amount: 4 tablets, Disp: 28 tablet, Rfl: 0    naloxone (NARCAN) 4 MG/0.1ML LIQD nasal spray, 1 spray by Nasal route as needed for Opioid Reversal, Disp: 1 each, Rfl: 0    ondansetron (ZOFRAN) 4 MG tablet, Take 4 mg by mouth every 8 hours as needed for Nausea or Vomiting, Disp: , Rfl:     butalbital-acetaminophen-caffeine (FIORICET, ESGIC) -40 MG per tablet, Take 1 tablet by mouth 2 times daily as needed for Headaches, Disp: 30 tablet, Rfl: 0    traZODone (DESYREL) 50 MG tablet, Take 1 tablet by mouth nightly as needed for Sleep Take 1/2 to 1 tablet at bedtime as needed for sleep., Disp: 30 tablet, Rfl: 0    gabapentin (NEURONTIN) 100 MG capsule, Take 100 mg by mouth as needed. , Disp: , Rfl:     traMADol (ULTRAM) 50 MG tablet, Take 50 mg by mouth as needed. , Disp: , Rfl:     escitalopram (LEXAPRO) 10 MG tablet, Take 10 mg by mouth daily, Disp: , Rfl:     lisinopril (PRINIVIL;ZESTRIL) 5 MG tablet, Take 5 mg by mouth daily, Disp: , Rfl:     Allergies:  No Known Allergies      Physical Exam:      Respirations are unlabored and there is no evidence of cyanosis      Gait is improved    No new neurologic deficit       Radiographic Studies:     X-rays including AP and lateral views of the lumbar spine were reviewed and interpreted:     Postoperative changes are noted status post lumbar fusion with instrumentation. The hardware appears to be well-placed and intact. There is no evidence of significant osteolysis. No significant adjacent level decompensation. Alignment is maintained. Radiographic Impression:    Appropriate postoperative changes status post lumbar laminectomy and fusion without evidence for hardware failure or decompensation. Diagnosis:      ICD-10-CM    1. Status post lumbar spinal arthrodesis  Z98.1           Assessment/Plan: This patient is following the expected course following the spine surgery. She can continue to gradually increase activities as tolerated. She would benefit from physical therapy for post lumbar fusion mobilization and core strengthening. I recommended remaining out of work until May 1, 2023. I will have her return for follow up 2 months.            Darrian Celis MD    03/06/23  12:15 PM
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
## 2023-03-09 ENCOUNTER — TELEPHONE (OUTPATIENT)
Dept: ORTHOPEDIC SURGERY | Age: 58
End: 2023-03-09

## 2023-03-16 ENCOUNTER — TELEPHONE (OUTPATIENT)
Dept: ORTHOPEDIC SURGERY | Age: 58
End: 2023-03-16

## 2023-03-16 NOTE — TELEPHONE ENCOUNTER
[facility]/release point requested records, faxed. Copy of request to scanning.

## 2023-03-23 ENCOUNTER — TELEPHONE (OUTPATIENT)
Dept: ORTHOPEDIC SURGERY | Age: 58
End: 2023-03-23

## 2023-03-23 DIAGNOSIS — Z98.1 STATUS POST LUMBAR SPINAL ARTHRODESIS: Primary | ICD-10-CM

## 2023-03-23 RX ORDER — OXYCODONE HYDROCHLORIDE AND ACETAMINOPHEN 5; 325 MG/1; MG/1
1 TABLET ORAL EVERY 6 HOURS PRN
Qty: 28 TABLET | Refills: 0 | Status: SHIPPED | OUTPATIENT
Start: 2023-03-23 | End: 2023-03-30

## 2023-03-23 NOTE — TELEPHONE ENCOUNTER
Sent prescription refill request to Dr Melecio Verma to authorize and send to patient's pharmacy on file

## 2023-03-27 ENCOUNTER — TELEPHONE (OUTPATIENT)
Dept: ORTHOPEDIC SURGERY | Age: 58
End: 2023-03-27

## 2023-03-27 DIAGNOSIS — Z98.1 STATUS POST LUMBAR SPINAL ARTHRODESIS: Primary | ICD-10-CM

## 2023-03-27 RX ORDER — HYDROCODONE BITARTRATE AND ACETAMINOPHEN 5; 325 MG/1; MG/1
1 TABLET ORAL EVERY 6 HOURS PRN
Qty: 28 TABLET | Refills: 0 | Status: SHIPPED | OUTPATIENT
Start: 2023-03-27 | End: 2023-04-03

## 2023-03-27 NOTE — TELEPHONE ENCOUNTER
She had back surgery and is now in PT. She says the Oxycodone is making her very nauseated. Can you give her something that is not as strong. She does use [facility] on [address].

## 2023-04-25 ENCOUNTER — TELEPHONE (OUTPATIENT)
Dept: ORTHOPEDIC SURGERY | Age: 58
End: 2023-04-25

## 2023-04-25 ENCOUNTER — CLINICAL DOCUMENTATION (OUTPATIENT)
Dept: ORTHOPEDIC SURGERY | Age: 58
End: 2023-04-25

## 2023-04-25 DIAGNOSIS — Z98.1 STATUS POST LUMBAR SPINAL ARTHRODESIS: Primary | ICD-10-CM

## 2023-04-25 RX ORDER — HYDROCODONE BITARTRATE AND ACETAMINOPHEN 5; 325 MG/1; MG/1
1 TABLET ORAL EVERY 6 HOURS PRN
Qty: 28 TABLET | Refills: 0 | Status: SHIPPED | OUTPATIENT
Start: 2023-04-25 | End: 2023-05-02

## 2023-04-25 NOTE — TELEPHONE ENCOUNTER
She says she is needing something for pain. She says she is having the same problem as before. She does use [facility] on [address].

## 2023-04-26 ENCOUNTER — CLINICAL DOCUMENTATION (OUTPATIENT)
Dept: ORTHOPEDIC SURGERY | Age: 58
End: 2023-04-26

## 2023-04-26 NOTE — TELEPHONE ENCOUNTER
Dr. Rojelio Sheffield approved and sent Rx to [facility] on [address]. Called patient and let her know.

## 2023-05-01 ENCOUNTER — TELEPHONE (OUTPATIENT)
Dept: ORTHOPEDIC SURGERY | Age: 58
End: 2023-05-01

## 2023-05-02 NOTE — TELEPHONE ENCOUNTER
Spoke with patient. She will schedule a follow up with Dr. Brittany Sherman to discuss returning to work with restrictions. Once her appointment is scheduled, I will write her a letter to extend back to work date after she follows up with [facility].

## 2023-05-04 ENCOUNTER — TELEPHONE (OUTPATIENT)
Dept: ORTHOPEDIC SURGERY | Age: 58
End: 2023-05-04

## 2023-05-04 NOTE — TELEPHONE ENCOUNTER
Patient left a msg,  needs to speak to someone covering for Sunil Johnson which she spoke to this morning about a work note for [employer] because she has called them and they don't have? ?

## 2023-05-04 NOTE — TELEPHONE ENCOUNTER
Spoke with patient and she will be getting me the fax number to fax letter extending her return to work date.

## 2023-05-08 ENCOUNTER — CLINICAL DOCUMENTATION (OUTPATIENT)
Dept: ORTHOPEDIC SURGERY | Age: 58
End: 2023-05-08

## 2023-05-11 NOTE — PERIOP NOTE
Report to Popeye Montiel RN. Ruled out.  Pt does not have CHF.  BNP low.  Echo report reviewed.    BNP  Recent Labs   Lab 05/09/23  1107   BNP 39

## 2023-05-12 ENCOUNTER — TELEPHONE (OUTPATIENT)
Dept: ORTHOPEDIC SURGERY | Age: 58
End: 2023-05-12

## 2023-05-15 ENCOUNTER — CLINICAL DOCUMENTATION (OUTPATIENT)
Dept: ORTHOPEDIC SURGERY | Age: 58
End: 2023-05-15

## 2023-05-18 ENCOUNTER — OFFICE VISIT (OUTPATIENT)
Dept: ORTHOPEDIC SURGERY | Age: 58
End: 2023-05-18

## 2023-05-18 DIAGNOSIS — Z98.1 STATUS POST LUMBAR SPINAL ARTHRODESIS: Primary | ICD-10-CM
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
## 2023-05-22 ENCOUNTER — TELEPHONE (OUTPATIENT)
Dept: ORTHOPEDIC SURGERY | Age: 58
End: 2023-05-22

## 2023-05-22 DIAGNOSIS — Z98.1 STATUS POST LUMBAR SPINAL ARTHRODESIS: Primary | ICD-10-CM

## 2023-05-22 RX ORDER — HYDROCODONE BITARTRATE AND ACETAMINOPHEN 5; 325 MG/1; MG/1
1 TABLET ORAL EVERY 6 HOURS PRN
Qty: 28 TABLET | Refills: 0 | Status: SHIPPED | OUTPATIENT
Start: 2023-05-22 | End: 2023-05-22

## 2023-05-22 RX ORDER — HYDROCODONE BITARTRATE AND ACETAMINOPHEN 5; 325 MG/1; MG/1
1 TABLET ORAL EVERY 4 HOURS PRN
Qty: 42 TABLET | Refills: 0 | Status: SHIPPED | OUTPATIENT
Start: 2023-05-22 | End: 2023-05-29

## 2023-06-06 ENCOUNTER — CLINICAL DOCUMENTATION (OUTPATIENT)
Dept: ORTHOPEDIC SURGERY | Age: 58
End: 2023-06-06

## 2023-06-07 ENCOUNTER — CLINICAL DOCUMENTATION (OUTPATIENT)
Dept: ORTHOPEDIC SURGERY | Age: 58
End: 2023-06-07

## 2023-07-19 ENCOUNTER — CLINICAL DOCUMENTATION (OUTPATIENT)
Dept: ORTHOPEDIC SURGERY | Age: 58
End: 2023-07-19

## 2023-08-01 ENCOUNTER — CLINICAL DOCUMENTATION (OUTPATIENT)
Dept: ORTHOPEDIC SURGERY | Age: 58
End: 2023-08-01

## 2023-08-15 ENCOUNTER — CLINICAL DOCUMENTATION (OUTPATIENT)
Dept: ORTHOPEDIC SURGERY | Age: 58
End: 2023-08-15

## 2023-08-23 ENCOUNTER — TELEPHONE (OUTPATIENT)
Dept: ORTHOPEDIC SURGERY | Age: 58
End: 2023-08-23

## 2023-08-29 ENCOUNTER — CLINICAL DOCUMENTATION (OUTPATIENT)
Dept: ORTHOPEDIC SURGERY | Age: 58
End: 2023-08-29

## 2023-11-10 ENCOUNTER — OFFICE VISIT (OUTPATIENT)
Dept: FAMILY MEDICINE CLINIC | Facility: CLINIC | Age: 58
End: 2023-11-10

## 2023-11-10 VITALS
HEIGHT: 62 IN | OXYGEN SATURATION: 99 % | HEART RATE: 105 BPM | BODY MASS INDEX: 22.08 KG/M2 | DIASTOLIC BLOOD PRESSURE: 82 MMHG | WEIGHT: 120 LBS | SYSTOLIC BLOOD PRESSURE: 144 MMHG | TEMPERATURE: 99 F

## 2023-11-10 DIAGNOSIS — K52.9 ACUTE GASTROENTERITIS: Primary | ICD-10-CM

## 2023-11-10 PROBLEM — G93.41 ACUTE METABOLIC ENCEPHALOPATHY: Status: RESOLVED | Noted: 2021-10-13 | Resolved: 2023-11-10

## 2023-11-10 PROBLEM — J96.01 ACUTE RESPIRATORY FAILURE WITH HYPOXIA (HCC): Status: RESOLVED | Noted: 2021-10-13 | Resolved: 2023-11-10

## 2023-11-10 PROBLEM — J12.82 PNEUMONIA DUE TO COVID-19 VIRUS: Status: RESOLVED | Noted: 2021-10-13 | Resolved: 2023-11-10

## 2023-11-10 PROBLEM — U07.1 PNEUMONIA DUE TO COVID-19 VIRUS: Status: RESOLVED | Noted: 2021-10-13 | Resolved: 2023-11-10

## 2023-11-10 PROBLEM — R78.81 BACTEREMIA DUE TO GRAM-POSITIVE BACTERIA: Status: RESOLVED | Noted: 2021-10-15 | Resolved: 2023-11-10

## 2023-11-10 PROBLEM — J18.9 MULTIFOCAL PNEUMONIA: Status: RESOLVED | Noted: 2021-10-13 | Resolved: 2023-11-10

## 2023-11-10 PROCEDURE — 99213 OFFICE O/P EST LOW 20 MIN: CPT | Performed by: FAMILY MEDICINE

## 2023-11-10 RX ORDER — ONDANSETRON 4 MG/1
4 TABLET, ORALLY DISINTEGRATING ORAL EVERY 8 HOURS PRN
Qty: 10 TABLET | Refills: 0 | Status: SHIPPED | OUTPATIENT
Start: 2023-11-10

## 2023-11-10 SDOH — ECONOMIC STABILITY: HOUSING INSECURITY
IN THE LAST 12 MONTHS, WAS THERE A TIME WHEN YOU DID NOT HAVE A STEADY PLACE TO SLEEP OR SLEPT IN A SHELTER (INCLUDING NOW)?: NO

## 2023-11-10 SDOH — ECONOMIC STABILITY: INCOME INSECURITY: HOW HARD IS IT FOR YOU TO PAY FOR THE VERY BASICS LIKE FOOD, HOUSING, MEDICAL CARE, AND HEATING?: NOT HARD AT ALL

## 2023-11-10 SDOH — ECONOMIC STABILITY: FOOD INSECURITY: WITHIN THE PAST 12 MONTHS, YOU WORRIED THAT YOUR FOOD WOULD RUN OUT BEFORE YOU GOT MONEY TO BUY MORE.: NEVER TRUE

## 2023-11-10 SDOH — ECONOMIC STABILITY: FOOD INSECURITY: WITHIN THE PAST 12 MONTHS, THE FOOD YOU BOUGHT JUST DIDN'T LAST AND YOU DIDN'T HAVE MONEY TO GET MORE.: NEVER TRUE

## 2023-11-10 ASSESSMENT — PATIENT HEALTH QUESTIONNAIRE - PHQ9
8. MOVING OR SPEAKING SO SLOWLY THAT OTHER PEOPLE COULD HAVE NOTICED. OR THE OPPOSITE, BEING SO FIGETY OR RESTLESS THAT YOU HAVE BEEN MOVING AROUND A LOT MORE THAN USUAL: 0
3. TROUBLE FALLING OR STAYING ASLEEP: 0
4. FEELING TIRED OR HAVING LITTLE ENERGY: 0
5. POOR APPETITE OR OVEREATING: 0
SUM OF ALL RESPONSES TO PHQ QUESTIONS 1-9: 0
SUM OF ALL RESPONSES TO PHQ QUESTIONS 1-9: 0
2. FEELING DOWN, DEPRESSED OR HOPELESS: 0
SUM OF ALL RESPONSES TO PHQ QUESTIONS 1-9: 0
SUM OF ALL RESPONSES TO PHQ9 QUESTIONS 1 & 2: 0
7. TROUBLE CONCENTRATING ON THINGS, SUCH AS READING THE NEWSPAPER OR WATCHING TELEVISION: 0
9. THOUGHTS THAT YOU WOULD BE BETTER OFF DEAD, OR OF HURTING YOURSELF: 0
1. LITTLE INTEREST OR PLEASURE IN DOING THINGS: 0
SUM OF ALL RESPONSES TO PHQ QUESTIONS 1-9: 0
6. FEELING BAD ABOUT YOURSELF - OR THAT YOU ARE A FAILURE OR HAVE LET YOURSELF OR YOUR FAMILY DOWN: 0
10. IF YOU CHECKED OFF ANY PROBLEMS, HOW DIFFICULT HAVE THESE PROBLEMS MADE IT FOR YOU TO DO YOUR WORK, TAKE CARE OF THINGS AT HOME, OR GET ALONG WITH OTHER PEOPLE: 0

## 2023-11-10 ASSESSMENT — ENCOUNTER SYMPTOMS
SHORTNESS OF BREATH: 0
NAUSEA: 1
VOMITING: 1
DIARRHEA: 1
ABDOMINAL PAIN: 1
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
## 2023-11-29 ENCOUNTER — TELEPHONE (OUTPATIENT)
Dept: FAMILY MEDICINE CLINIC | Facility: CLINIC | Age: 58
End: 2023-11-29

## 2023-11-29 NOTE — TELEPHONE ENCOUNTER
Care Transitions Initial Follow Up Call    Outreach made within 2 business days of discharge: Yes    Patient: Lan Rausch Patient : 1965   MRN: [id number]  Reason for Admission: There are no discharge diagnoses documented for the most recent discharge. Discharge Date: 22       Spoke with: Pt     Discharge department/facility: Home    TCM Interactive Patient Contact:  Was patient able to fill all prescriptions: Yes  Was patient instructed to bring all medications to the follow-up visit: Yes  Is patient taking all medications as directed in the discharge summary?  Yes  Does patient understand their discharge instructions: Yes  Does patient have questions or concerns that need addressed prior to 7-14 day follow up office visit: no    Scheduled appointment with PCP within 7-14 days    Follow Up  Future Appointments   Date Time Provider [address]   2023 10:30 AM NATALIA Land -  E Lillian White [facility]       Mary Carmen Martinez MA

## 2023-11-29 NOTE — TELEPHONE ENCOUNTER
Patient is being discharged from [facility] today 111/29/23 for syncope  She has h/f scheduled on Dec 5 @ 1030 am please call TCM

## 2023-12-05 ENCOUNTER — OFFICE VISIT (OUTPATIENT)
Dept: FAMILY MEDICINE CLINIC | Facility: CLINIC | Age: 58
End: 2023-12-05

## 2023-12-05 VITALS
HEART RATE: 88 BPM | SYSTOLIC BLOOD PRESSURE: 150 MMHG | BODY MASS INDEX: 22.5 KG/M2 | OXYGEN SATURATION: 98 % | DIASTOLIC BLOOD PRESSURE: 90 MMHG | TEMPERATURE: 98 F | WEIGHT: 123 LBS

## 2023-12-05 DIAGNOSIS — Z12.11 COLON CANCER SCREENING: ICD-10-CM

## 2023-12-05 DIAGNOSIS — Z12.31 BREAST CANCER SCREENING BY MAMMOGRAM: ICD-10-CM

## 2023-12-05 DIAGNOSIS — I10 PRIMARY HYPERTENSION: ICD-10-CM

## 2023-12-05 DIAGNOSIS — Z09 HOSPITAL DISCHARGE FOLLOW-UP: Primary | ICD-10-CM

## 2023-12-05 DIAGNOSIS — Z12.4 ENCOUNTER FOR PAPANICOLAOU SMEAR FOR CERVICAL CANCER SCREENING: ICD-10-CM

## 2023-12-05 PROCEDURE — 1111F DSCHRG MED/CURRENT MED MERGE: CPT | Performed by: NURSE PRACTITIONER

## 2023-12-05 PROCEDURE — 3080F DIAST BP >= 90 MM HG: CPT | Performed by: NURSE PRACTITIONER

## 2023-12-05 PROCEDURE — 99214 OFFICE O/P EST MOD 30 MIN: CPT | Performed by: NURSE PRACTITIONER

## 2023-12-05 PROCEDURE — 3077F SYST BP >= 140 MM HG: CPT | Performed by: NURSE PRACTITIONER

## 2023-12-05 RX ORDER — LEVETIRACETAM 500 MG/1
500 TABLET ORAL 2 TIMES DAILY
COMMUNITY

## 2023-12-05 RX ORDER — LISINOPRIL 5 MG/1
5 TABLET ORAL DAILY
Qty: 30 TABLET | Refills: 5 | Status: SHIPPED | OUTPATIENT
Start: 2023-12-05

## 2023-12-05 RX ORDER — AMOXICILLIN 875 MG/1
875 TABLET, COATED ORAL 2 TIMES DAILY
COMMUNITY

## 2023-12-05 ASSESSMENT — PATIENT HEALTH QUESTIONNAIRE - PHQ9
1. LITTLE INTEREST OR PLEASURE IN DOING THINGS: 0
SUM OF ALL RESPONSES TO PHQ QUESTIONS 1-9: 0
SUM OF ALL RESPONSES TO PHQ9 QUESTIONS 1 & 2: 0
2. FEELING DOWN, DEPRESSED OR HOPELESS: 0
SUM OF ALL RESPONSES TO PHQ QUESTIONS 1-9: 0

## 2023-12-05 ASSESSMENT — ENCOUNTER SYMPTOMS
RESPIRATORY NEGATIVE: 1
DIARRHEA: 0
NAUSEA: 0
VOMITING: 0

## 2023-12-05 NOTE — PROGRESS NOTES
completed per patient with no known abnormalities. No blood pressure medication prescribed from the hospital.    Patient has a history of anxiety, depression, enucleation of left eye, degenerative joint disease (followed by [facility] for knee pain), HTN (off meds, continue to monitor), and hx COVID-19 pneumonia with respiratory failure. Anemia since 2021. Unable to visualize hospital summary or records. Requested patient sign a release ([employer]). Inpatient course: Discharge summary reviewed- see chart. Interval history/Current status: stable    Patient Active Problem List   Diagnosis    Depression    Complication of prosthetic eye    Eye pain    Anxiety and depression    History of enucleation of left eyeball    Symblepharon of left eye    Hypokalemia    Weakness    DJD (degenerative joint disease)    Spondylolisthesis of lumbar region    Lumbar radiculopathy    Spinal stenosis, lumbar region, with neurogenic claudication       Medication list at time of discharge reviewed: Yes    Medications marked \"taking\" at this time  Outpatient Medications Marked as Taking for the 12/5/23 encounter (Office Visit) with NATALIA Purdy Ra, CNP   Medication Sig Dispense Refill    levETIRAcetam (KEPPRA) 500 MG tablet Take 1 tablet by mouth 2 times daily      amoxicillin (AMOXIL) 875 MG tablet Take 1 tablet by mouth 2 times daily      lisinopril (PRINIVIL;ZESTRIL) 5 MG tablet Take 1 tablet by mouth daily 30 tablet 5    ondansetron (ZOFRAN-ODT) 4 MG disintegrating tablet Take 1 tablet by mouth every 8 hours as needed for Nausea or Vomiting 10 tablet 0    naloxone (NARCAN) 4 MG/0.1ML LIQD nasal spray 1 spray by Nasal route as needed for Opioid Reversal 1 each 0    traMADol (ULTRAM) 50 MG tablet Take 1 tablet by mouth as needed.           Medications patient taking as of now reconciled against medications ordered at time of hospital discharge: Yes    Review of Systems   Constitutional:  Negative for appetite change,

## 2024-01-04 ENCOUNTER — OFFICE VISIT (OUTPATIENT)
Dept: FAMILY MEDICINE CLINIC | Facility: CLINIC | Age: 59
End: 2024-01-04
Payer: COMMERCIAL

## 2024-01-04 VITALS
HEART RATE: 88 BPM | DIASTOLIC BLOOD PRESSURE: 94 MMHG | TEMPERATURE: 98.2 F | SYSTOLIC BLOOD PRESSURE: 144 MMHG | OXYGEN SATURATION: 98 % | WEIGHT: 129 LBS | BODY MASS INDEX: 23.59 KG/M2

## 2024-01-04 DIAGNOSIS — F32.1 CURRENT MODERATE EPISODE OF MAJOR DEPRESSIVE DISORDER WITHOUT PRIOR EPISODE (HCC): Primary | ICD-10-CM

## 2024-01-04 DIAGNOSIS — F32.9 REACTIVE DEPRESSION: ICD-10-CM

## 2024-01-04 DIAGNOSIS — I10 PRIMARY HYPERTENSION: ICD-10-CM

## 2024-01-04 DIAGNOSIS — Z79.899 MEDICATION MANAGEMENT: ICD-10-CM

## 2024-01-04 DIAGNOSIS — R56.9 SEIZURE (HCC): ICD-10-CM

## 2024-01-04 DIAGNOSIS — G89.29 CHRONIC NONINTRACTABLE HEADACHE, UNSPECIFIED HEADACHE TYPE: ICD-10-CM

## 2024-01-04 DIAGNOSIS — R51.9 CHRONIC NONINTRACTABLE HEADACHE, UNSPECIFIED HEADACHE TYPE: ICD-10-CM

## 2024-01-04 DIAGNOSIS — Z71.9 ENCOUNTER FOR COUNSELING: ICD-10-CM

## 2024-01-04 DIAGNOSIS — F51.04 PSYCHOPHYSIOLOGICAL INSOMNIA: ICD-10-CM

## 2024-01-04 PROCEDURE — 3077F SYST BP >= 140 MM HG: CPT | Performed by: NURSE PRACTITIONER

## 2024-01-04 PROCEDURE — 3080F DIAST BP >= 90 MM HG: CPT | Performed by: NURSE PRACTITIONER

## 2024-01-04 PROCEDURE — 99214 OFFICE O/P EST MOD 30 MIN: CPT | Performed by: NURSE PRACTITIONER

## 2024-01-04 RX ORDER — TRAZODONE HYDROCHLORIDE 50 MG/1
25-50 TABLET ORAL NIGHTLY PRN
Qty: 30 TABLET | Refills: 2 | Status: SHIPPED | OUTPATIENT
Start: 2024-01-04

## 2024-01-04 RX ORDER — LEVETIRACETAM 500 MG/1
500 TABLET ORAL 2 TIMES DAILY
Qty: 60 TABLET | Refills: 1 | Status: SHIPPED | OUTPATIENT
Start: 2024-01-04

## 2024-01-04 RX ORDER — LISINOPRIL 5 MG/1
5 TABLET ORAL DAILY
Qty: 90 TABLET | Refills: 1 | Status: SHIPPED | OUTPATIENT
Start: 2024-01-04

## 2024-01-04 ASSESSMENT — PATIENT HEALTH QUESTIONNAIRE - PHQ9
4. FEELING TIRED OR HAVING LITTLE ENERGY: 3
7. TROUBLE CONCENTRATING ON THINGS, SUCH AS READING THE NEWSPAPER OR WATCHING TELEVISION: 0
SUM OF ALL RESPONSES TO PHQ9 QUESTIONS 1 & 2: 1
5. POOR APPETITE OR OVEREATING: 0
1. LITTLE INTEREST OR PLEASURE IN DOING THINGS: 0
8. MOVING OR SPEAKING SO SLOWLY THAT OTHER PEOPLE COULD HAVE NOTICED. OR THE OPPOSITE, BEING SO FIGETY OR RESTLESS THAT YOU HAVE BEEN MOVING AROUND A LOT MORE THAN USUAL: 0
SUM OF ALL RESPONSES TO PHQ QUESTIONS 1-9: 8
3. TROUBLE FALLING OR STAYING ASLEEP: 3
6. FEELING BAD ABOUT YOURSELF - OR THAT YOU ARE A FAILURE OR HAVE LET YOURSELF OR YOUR FAMILY DOWN: 1
10. IF YOU CHECKED OFF ANY PROBLEMS, HOW DIFFICULT HAVE THESE PROBLEMS MADE IT FOR YOU TO DO YOUR WORK, TAKE CARE OF THINGS AT HOME, OR GET ALONG WITH OTHER PEOPLE: 1
2. FEELING DOWN, DEPRESSED OR HOPELESS: 1
9. THOUGHTS THAT YOU WOULD BE BETTER OFF DEAD, OR OF HURTING YOURSELF: 0
SUM OF ALL RESPONSES TO PHQ QUESTIONS 1-9: 8

## 2024-01-04 ASSESSMENT — ENCOUNTER SYMPTOMS
RESPIRATORY NEGATIVE: 1
BACK PAIN: 1

## 2024-01-04 NOTE — PROGRESS NOTES
sounds.   Pulmonary:      Effort: Pulmonary effort is normal.      Breath sounds: Normal breath sounds.   Abdominal:      General: There is no distension.   Musculoskeletal:         General: Normal range of motion.      Cervical back: Normal range of motion and neck supple.      Right lower leg: No edema.      Left lower leg: No edema.   Skin:     General: Skin is warm and dry.      Coloration: Skin is not jaundiced or pale.   Neurological:      General: No focal deficit present.      Mental Status: She is alert and oriented to person, place, and time.   Psychiatric:         Mood and Affect: Mood normal.         Behavior: Behavior normal.         Thought Content: Thought content normal.         Judgment: Judgment normal.          1. Current moderate episode of major depressive disorder without prior episode (HCC)  -     External Referral to Psychiatry  -     traZODone (DESYREL) 50 MG tablet; Take 0.5-1 tablets by mouth nightly as needed for Sleep, Disp-30 tablet, R-2Normal  2. Primary hypertension  -     lisinopril (PRINIVIL;ZESTRIL) 5 MG tablet; Take 1 tablet by mouth daily, Disp-90 tablet, R-1Normal  3. Seizure (formerly [facility])  -     levETIRAcetam (KEPPRA) 500 MG tablet; Take 1 tablet by mouth 2 times daily, Disp-60 tablet, R-1Normal  4. Psychophysiological insomnia  -     External Referral to Psychiatry  -     traZODone (DESYREL) 50 MG tablet; Take 0.5-1 tablets by mouth nightly as needed for Sleep, Disp-30 tablet, R-2Normal  5. Reactive depression  -     External Referral to Psychiatry  -     traZODone (DESYREL) 50 MG tablet; Take 0.5-1 tablets by mouth nightly as needed for Sleep, Disp-30 tablet, R-2Normal  6. Medication management  -     External Referral to Psychiatry  7. Encounter for counseling  -     External Referral to Psychiatry  8. Chronic nonintractable headache, unspecified headache type  -     Ubrogepant 50 MG TABS; Take 50 mg by mouth daily as needed (migraine) May repeat dose once after 2 hours for max dose

## 2024-01-05 DIAGNOSIS — Z12.83 SKIN CANCER SCREENING: Primary | ICD-10-CM

## 2024-01-05 DIAGNOSIS — L98.9 FACIAL LESION: ICD-10-CM

## 2024-03-01 ENCOUNTER — TELEPHONE (OUTPATIENT)
Dept: GASTROENTEROLOGY | Age: 59
End: 2024-03-01

## 2024-07-31 ENCOUNTER — COMMUNITY OUTREACH (OUTPATIENT)
Dept: FAMILY MEDICINE CLINIC | Facility: CLINIC | Age: 59
End: 2024-07-31

## 2025-03-07 ENCOUNTER — HOSPITAL ENCOUNTER (OUTPATIENT)
Dept: GENERAL RADIOLOGY | Age: 60
Discharge: HOME | End: 2025-03-09
Payer: COMMERCIAL

## 2025-03-07 DIAGNOSIS — M25.561 PAIN, JOINT, KNEE, RIGHT: ICD-10-CM

## 2025-03-07 PROCEDURE — 73560 X-RAY EXAM OF KNEE 1 OR 2: CPT

## 2025-03-11 ASSESSMENT — PATIENT HEALTH QUESTIONNAIRE - PHQ9
9. THOUGHTS THAT YOU WOULD BE BETTER OFF DEAD, OR OF HURTING YOURSELF: NOT AT ALL
1. LITTLE INTEREST OR PLEASURE IN DOING THINGS: SEVERAL DAYS
8. MOVING OR SPEAKING SO SLOWLY THAT OTHER PEOPLE COULD HAVE NOTICED. OR THE OPPOSITE - BEING SO FIDGETY OR RESTLESS THAT YOU HAVE BEEN MOVING AROUND A LOT MORE THAN USUAL: NOT AT ALL
5. POOR APPETITE OR OVEREATING: SEVERAL DAYS
2. FEELING DOWN, DEPRESSED OR HOPELESS: SEVERAL DAYS
SUM OF ALL RESPONSES TO PHQ QUESTIONS 1-9: 7
7. TROUBLE CONCENTRATING ON THINGS, SUCH AS READING THE NEWSPAPER OR WATCHING TELEVISION: SEVERAL DAYS
SUM OF ALL RESPONSES TO PHQ QUESTIONS 1-9: 7
10. IF YOU CHECKED OFF ANY PROBLEMS, HOW DIFFICULT HAVE THESE PROBLEMS MADE IT FOR YOU TO DO YOUR WORK, TAKE CARE OF THINGS AT HOME, OR GET ALONG WITH OTHER PEOPLE: NOT DIFFICULT AT ALL
4. FEELING TIRED OR HAVING LITTLE ENERGY: SEVERAL DAYS
8. MOVING OR SPEAKING SO SLOWLY THAT OTHER PEOPLE COULD HAVE NOTICED. OR THE OPPOSITE, BEING SO FIGETY OR RESTLESS THAT YOU HAVE BEEN MOVING AROUND A LOT MORE THAN USUAL: NOT AT ALL
5. POOR APPETITE OR OVEREATING: SEVERAL DAYS
SUM OF ALL RESPONSES TO PHQ QUESTIONS 1-9: 7
1. LITTLE INTEREST OR PLEASURE IN DOING THINGS: SEVERAL DAYS
3. TROUBLE FALLING OR STAYING ASLEEP: SEVERAL DAYS
4. FEELING TIRED OR HAVING LITTLE ENERGY: SEVERAL DAYS
SUM OF ALL RESPONSES TO PHQ QUESTIONS 1-9: 7
7. TROUBLE CONCENTRATING ON THINGS, SUCH AS READING THE NEWSPAPER OR WATCHING TELEVISION: SEVERAL DAYS
6. FEELING BAD ABOUT YOURSELF - OR THAT YOU ARE A FAILURE OR HAVE LET YOURSELF OR YOUR FAMILY DOWN: SEVERAL DAYS
2. FEELING DOWN, DEPRESSED OR HOPELESS: SEVERAL DAYS
9. THOUGHTS THAT YOU WOULD BE BETTER OFF DEAD, OR OF HURTING YOURSELF: NOT AT ALL
10. IF YOU CHECKED OFF ANY PROBLEMS, HOW DIFFICULT HAVE THESE PROBLEMS MADE IT FOR YOU TO DO YOUR WORK, TAKE CARE OF THINGS AT HOME, OR GET ALONG WITH OTHER PEOPLE: NOT DIFFICULT AT ALL
6. FEELING BAD ABOUT YOURSELF - OR THAT YOU ARE A FAILURE OR HAVE LET YOURSELF OR YOUR FAMILY DOWN: SEVERAL DAYS
3. TROUBLE FALLING OR STAYING ASLEEP: SEVERAL DAYS
SUM OF ALL RESPONSES TO PHQ QUESTIONS 1-9: 7

## 2025-03-14 ENCOUNTER — OFFICE VISIT (OUTPATIENT)
Dept: FAMILY MEDICINE CLINIC | Facility: CLINIC | Age: 60
End: 2025-03-14
Payer: COMMERCIAL

## 2025-03-14 VITALS
OXYGEN SATURATION: 99 % | HEART RATE: 84 BPM | BODY MASS INDEX: 27.05 KG/M2 | DIASTOLIC BLOOD PRESSURE: 90 MMHG | RESPIRATION RATE: 16 BRPM | SYSTOLIC BLOOD PRESSURE: 120 MMHG | WEIGHT: 147 LBS | TEMPERATURE: 97.7 F | HEIGHT: 62 IN

## 2025-03-14 DIAGNOSIS — I10 PRIMARY HYPERTENSION: ICD-10-CM

## 2025-03-14 DIAGNOSIS — Z13.29 THYROID DISORDER SCREENING: ICD-10-CM

## 2025-03-14 DIAGNOSIS — F32.1 CURRENT MODERATE EPISODE OF MAJOR DEPRESSIVE DISORDER WITHOUT PRIOR EPISODE (HCC): Primary | ICD-10-CM

## 2025-03-14 DIAGNOSIS — Z13.220 ENCOUNTER FOR SCREENING FOR LIPID DISORDER: ICD-10-CM

## 2025-03-14 DIAGNOSIS — Z71.89 GRIEF COUNSELING: ICD-10-CM

## 2025-03-14 DIAGNOSIS — F51.04 PSYCHOPHYSIOLOGICAL INSOMNIA: ICD-10-CM

## 2025-03-14 DIAGNOSIS — F32.9 REACTIVE DEPRESSION: ICD-10-CM

## 2025-03-14 PROBLEM — T85.9XXA: Status: RESOLVED | Noted: 2019-10-03 | Resolved: 2025-03-14

## 2025-03-14 PROBLEM — M54.16 LUMBAR RADICULOPATHY: Status: RESOLVED | Noted: 2022-09-28 | Resolved: 2025-03-14

## 2025-03-14 PROBLEM — M43.16 SPONDYLOLISTHESIS OF LUMBAR REGION: Status: RESOLVED | Noted: 2022-09-28 | Resolved: 2025-03-14

## 2025-03-14 PROBLEM — H11.232 SYMBLEPHARON OF LEFT EYE: Status: RESOLVED | Noted: 2019-10-03 | Resolved: 2025-03-14

## 2025-03-14 LAB
ALBUMIN SERPL-MCNC: 3.6 G/DL (ref 3.5–5)
ALBUMIN/GLOB SERPL: 1 (ref 1–1.9)
ALP SERPL-CCNC: 91 U/L (ref 35–104)
ALT SERPL-CCNC: 15 U/L (ref 8–45)
ANION GAP SERPL CALC-SCNC: 14 MMOL/L (ref 7–16)
AST SERPL-CCNC: 21 U/L (ref 15–37)
BASOPHILS # BLD: 0.08 K/UL (ref 0–0.2)
BASOPHILS NFR BLD: 1.2 % (ref 0–2)
BILIRUB SERPL-MCNC: 0.4 MG/DL (ref 0–1.2)
BUN SERPL-MCNC: 14 MG/DL (ref 6–23)
CALCIUM SERPL-MCNC: 9.2 MG/DL (ref 8.8–10.2)
CHLORIDE SERPL-SCNC: 104 MMOL/L (ref 98–107)
CHOLEST SERPL-MCNC: 182 MG/DL (ref 0–200)
CO2 SERPL-SCNC: 21 MMOL/L (ref 20–29)
CREAT SERPL-MCNC: 0.99 MG/DL (ref 0.6–1.1)
DIFFERENTIAL METHOD BLD: ABNORMAL
EOSINOPHIL # BLD: 0.01 K/UL (ref 0–0.8)
EOSINOPHIL NFR BLD: 0.1 % (ref 0.5–7.8)
ERYTHROCYTE [DISTWIDTH] IN BLOOD BY AUTOMATED COUNT: 17.2 % (ref 11.9–14.6)
GLOBULIN SER CALC-MCNC: 3.5 G/DL (ref 2.3–3.5)
GLUCOSE SERPL-MCNC: 87 MG/DL (ref 70–99)
HCT VFR BLD AUTO: 36 % (ref 35.8–46.3)
HDLC SERPL-MCNC: 63 MG/DL (ref 40–60)
HDLC SERPL: 2.9 (ref 0–5)
HGB BLD-MCNC: 10.1 G/DL (ref 11.7–15.4)
IMM GRANULOCYTES # BLD AUTO: 0.01 K/UL (ref 0–0.5)
IMM GRANULOCYTES NFR BLD AUTO: 0.1 % (ref 0–5)
LDLC SERPL CALC-MCNC: 107 MG/DL (ref 0–100)
LYMPHOCYTES # BLD: 1 K/UL (ref 0.5–4.6)
LYMPHOCYTES NFR BLD: 14.6 % (ref 13–44)
MCH RBC QN AUTO: 22.8 PG (ref 26.1–32.9)
MCHC RBC AUTO-ENTMCNC: 28.1 G/DL (ref 31.4–35)
MCV RBC AUTO: 81.3 FL (ref 82–102)
MONOCYTES # BLD: 0.3 K/UL (ref 0.1–1.3)
MONOCYTES NFR BLD: 4.4 % (ref 4–12)
NEUTS SEG # BLD: 5.44 K/UL (ref 1.7–8.2)
NEUTS SEG NFR BLD: 79.6 % (ref 43–78)
NRBC # BLD: 0 K/UL (ref 0–0.2)
PLATELET # BLD AUTO: 405 K/UL (ref 150–450)
PMV BLD AUTO: 9.6 FL (ref 9.4–12.3)
POTASSIUM SERPL-SCNC: 4.1 MMOL/L (ref 3.5–5.1)
PROT SERPL-MCNC: 7.1 G/DL (ref 6.3–8.2)
RBC # BLD AUTO: 4.43 M/UL (ref 4.05–5.2)
SODIUM SERPL-SCNC: 139 MMOL/L (ref 136–145)
T4 FREE SERPL-MCNC: 1 NG/DL (ref 0.9–1.7)
TRIGL SERPL-MCNC: 58 MG/DL (ref 0–150)
TSH W FREE THYROID IF ABNORMAL: 0.26 UIU/ML (ref 0.27–4.2)
VLDLC SERPL CALC-MCNC: 12 MG/DL (ref 6–23)
WBC # BLD AUTO: 6.8 K/UL (ref 4.3–11.1)

## 2025-03-14 PROCEDURE — 3080F DIAST BP >= 90 MM HG: CPT | Performed by: NURSE PRACTITIONER

## 2025-03-14 PROCEDURE — 99214 OFFICE O/P EST MOD 30 MIN: CPT | Performed by: NURSE PRACTITIONER

## 2025-03-14 PROCEDURE — 3074F SYST BP LT 130 MM HG: CPT | Performed by: NURSE PRACTITIONER

## 2025-03-14 RX ORDER — TRAZODONE HYDROCHLORIDE 50 MG/1
25-50 TABLET ORAL NIGHTLY PRN
Qty: 90 TABLET | Refills: 2 | Status: SHIPPED | OUTPATIENT
Start: 2025-03-14

## 2025-03-14 RX ORDER — LISINOPRIL 5 MG/1
5 TABLET ORAL DAILY
Qty: 90 TABLET | Refills: 2 | Status: SHIPPED | OUTPATIENT
Start: 2025-03-14

## 2025-03-14 RX ORDER — FLUOXETINE 20 MG/1
20 TABLET, FILM COATED ORAL DAILY
Qty: 30 TABLET | Refills: 1 | Status: SHIPPED | OUTPATIENT
Start: 2025-03-14

## 2025-03-14 SDOH — ECONOMIC STABILITY: FOOD INSECURITY: WITHIN THE PAST 12 MONTHS, THE FOOD YOU BOUGHT JUST DIDN'T LAST AND YOU DIDN'T HAVE MONEY TO GET MORE.: NEVER TRUE

## 2025-03-14 SDOH — ECONOMIC STABILITY: FOOD INSECURITY: WITHIN THE PAST 12 MONTHS, YOU WORRIED THAT YOUR FOOD WOULD RUN OUT BEFORE YOU GOT MONEY TO BUY MORE.: NEVER TRUE

## 2025-03-14 ASSESSMENT — ENCOUNTER SYMPTOMS
GASTROINTESTINAL NEGATIVE: 1
RESPIRATORY NEGATIVE: 1
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
## 2025-03-24 ENCOUNTER — RESULTS FOLLOW-UP (OUTPATIENT)
Dept: FAMILY MEDICINE CLINIC | Facility: CLINIC | Age: 60
End: 2025-03-24

## 2025-03-24 DIAGNOSIS — R94.6 ABNORMAL THYROID FUNCTION TEST: Primary | ICD-10-CM

## 2025-03-24 DIAGNOSIS — D50.9 MICROCYTIC ANEMIA: Primary | ICD-10-CM

## 2025-03-24 NOTE — RESULT ENCOUNTER NOTE
TSH suppressed but T4 normal.  Will need to check thyroid antibody test.  Cholesterol looks ok with elevated heart protective HDL/near optimal LDL/ratio <3.  She has anemia.  Will need to check her iron studies, stool for occult blood, and recommend GI referral for endoscopy/overdue colonoscopy and may need upper endoscopy.  Please schedule lab visit with occult blood cards x 3and let me know if willing to be referred to GI.      Please let me know if patient has any further questions or concerns.

## 2025-04-07 ENCOUNTER — OFFICE VISIT (OUTPATIENT)
Dept: FAMILY MEDICINE CLINIC | Facility: CLINIC | Age: 60
End: 2025-04-07
Payer: COMMERCIAL

## 2025-04-07 VITALS
DIASTOLIC BLOOD PRESSURE: 78 MMHG | BODY MASS INDEX: 27.62 KG/M2 | HEART RATE: 86 BPM | SYSTOLIC BLOOD PRESSURE: 150 MMHG | TEMPERATURE: 99.3 F | WEIGHT: 151 LBS | OXYGEN SATURATION: 97 %

## 2025-04-07 DIAGNOSIS — D50.9 MICROCYTIC ANEMIA: ICD-10-CM

## 2025-04-07 DIAGNOSIS — L08.9 LEFT FOOT INFECTION: ICD-10-CM

## 2025-04-07 DIAGNOSIS — Z12.11 COLON CANCER SCREENING: Primary | ICD-10-CM

## 2025-04-07 DIAGNOSIS — G45.9 TIA (TRANSIENT ISCHEMIC ATTACK): ICD-10-CM

## 2025-04-07 DIAGNOSIS — M79.672 LEFT FOOT PAIN: ICD-10-CM

## 2025-04-07 DIAGNOSIS — M79.642 PAIN OF LEFT HAND: ICD-10-CM

## 2025-04-07 DIAGNOSIS — R94.6 ABNORMAL THYROID FUNCTION TEST: ICD-10-CM

## 2025-04-07 DIAGNOSIS — D50.9 IRON DEFICIENCY ANEMIA, UNSPECIFIED IRON DEFICIENCY ANEMIA TYPE: ICD-10-CM

## 2025-04-07 DIAGNOSIS — M79.89 LEG SWELLING: ICD-10-CM

## 2025-04-07 DIAGNOSIS — I10 PRIMARY HYPERTENSION: ICD-10-CM

## 2025-04-07 DIAGNOSIS — M25.561 ACUTE PAIN OF RIGHT KNEE: ICD-10-CM

## 2025-04-07 LAB
FERRITIN SERPL-MCNC: 10 NG/ML (ref 8–388)
HCT VFR BLD AUTO: 28.7 % (ref 35.8–46.3)
HGB BLD-MCNC: 7.9 G/DL (ref 11.7–15.4)
IRON SATN MFR SERPL: 4 % (ref 20–50)
IRON SERPL-MCNC: 14 UG/DL (ref 35–100)
T3 SERPL-MCNC: 0.82 NG/ML (ref 0.6–1.81)
TIBC SERPL-MCNC: 356 UG/DL (ref 240–450)
UIBC SERPL-MCNC: 342 UG/DL (ref 112–347)

## 2025-04-07 PROCEDURE — 3077F SYST BP >= 140 MM HG: CPT | Performed by: NURSE PRACTITIONER

## 2025-04-07 PROCEDURE — 99214 OFFICE O/P EST MOD 30 MIN: CPT | Performed by: NURSE PRACTITIONER

## 2025-04-07 PROCEDURE — 3078F DIAST BP <80 MM HG: CPT | Performed by: NURSE PRACTITIONER

## 2025-04-07 RX ORDER — HYDROCHLOROTHIAZIDE 12.5 MG/1
12.5 CAPSULE ORAL EVERY MORNING
Qty: 90 CAPSULE | Refills: 1 | Status: SHIPPED | OUTPATIENT
Start: 2025-04-07

## 2025-04-07 RX ORDER — DIVALPROEX SODIUM 500 MG/1
1000 TABLET, FILM COATED, EXTENDED RELEASE ORAL DAILY
COMMUNITY
Start: 2024-06-06 | End: 2025-06-06

## 2025-04-07 RX ORDER — BUPRENORPHINE 2 MG/1
TABLET SUBLINGUAL DAILY
COMMUNITY

## 2025-04-07 ASSESSMENT — ENCOUNTER SYMPTOMS: SHORTNESS OF BREATH: 0

## 2025-04-07 NOTE — PATIENT INSTRUCTIONS
oils to 2 to 3 servings each day. A serving is 1 teaspoon of vegetable oil or 2 tablespoons of salad dressing.  Limit sweets and added sugars to 5 servings or less a week. A serving is 1 tablespoon jelly or jam, 1/2 cup sorbet, or 1 cup of lemonade.  Eat less than 2,300 milligrams (mg) of sodium a day. If you limit your sodium to 1,500 mg a day, you can lower your blood pressure even more.  Be aware that all of these are the suggested number of servings for people who eat 1,800 to 2,000 calories a day. Your recommended number of servings may be different if you need more or fewer calories.  Tips for success  Start small. Make small changes, and stick with them. Once those changes become habit, add a few more changes.  Try some of the following:  Make it a goal to eat a fruit or vegetable at every meal and at snacks. This will make it easy to get the recommended amount of fruits and vegetables each day.  Try yogurt topped with fruit and nuts for a snack or healthy dessert.  Add lettuce, tomato, cucumber, and onion to sandwiches.  Have a variety of cut-up vegetables with a low-fat dip as an appetizer instead of chips and dip.  Sprinkle sunflower seeds or chopped almonds over salads. Or try adding chopped walnuts or almonds to cooked vegetables.  Try some vegetarian meals using beans and peas. Add garbanzo or kidney beans to salads. Make burritos and tacos with mashed clemente beans or black beans.  Where can you learn more?  Go to https://www.healthHousehappy.net/patientEd and enter H967 to learn more about \"DASH Diet: Care Instructions.\"  Current as of: October 7, 2024  Content Version: 14.4  © 6584-8299 [employer].   Care instructions adapted under license by [employer]. If you have questions about a medical condition or this instruction, always ask your healthcare professional. [employer] disclaims any warranty or liability for your use of this information.

## 2025-04-07 NOTE — PROGRESS NOTES
Sierra Leach is a 59 y.o. female who presents today for the following:  Chief Complaint   Patient presents with   • Edema     Pt presents today with bilateral edema of lower extremities x 3 days. Pt had episode of expressive aphasia over weekend. Pt hands are splitting.        No Known Allergies    Current Outpatient Medications   Medication Sig Dispense Refill   • ferrous sulfate (IRON 325) 325 (65 Fe) MG tablet Take 1 tablet by mouth daily (with breakfast) 60 tablet 0   • ascorbic acid (V-R VITAMIN C) 250 MG tablet Take 1 tablet by mouth daily 60 tablet 0   • divalproex (DEPAKOTE ER) 500 MG extended release tablet Take 2 tablets by mouth daily     • buprenorphine (SUBUTEX) 2 MG SUBL SL tablet Place under the tongue daily.     • hydroCHLOROthiazide 12.5 MG capsule Take 1 capsule by mouth every morning 90 capsule 1   • traZODone (DESYREL) 50 MG tablet Take 0.5-1 tablets by mouth nightly as needed for Sleep Take 1/2 tablet while starting fluoxetine for 1 week and then increase to 50 mg if needed thereafter. 90 tablet 2   • lisinopril (PRINIVIL;ZESTRIL) 5 MG tablet Take 1 tablet by mouth daily 90 tablet 2   • FLUoxetine (PROZAC) 20 MG tablet Take 1 tablet by mouth daily Take 1/2 tablet orally for two week, then increase to 1 tablet daily thereafter. 30 tablet 1   • ondansetron (ZOFRAN-ODT) 4 MG disintegrating tablet Take 1 tablet by mouth every 8 hours as needed for Nausea or Vomiting 10 tablet 0   • naloxone (NARCAN) 4 MG/0.1ML LIQD nasal spray 1 spray by Nasal route as needed for Opioid Reversal 1 each 0   • traMADol (ULTRAM) 50 MG tablet Take 1 tablet by mouth as needed. (Patient not taking: Reported on 4/7/2025)       No current facility-administered medications for this visit.       Past Medical History:   Diagnosis Date   • Anxiety and depression    • Back pain     radiating down Right leg   • Complication of prosthetic eye 10/03/2019   • DJD (degenerative joint disease) 07/08/2014   • History of

## 2025-04-08 DIAGNOSIS — D50.9 IRON DEFICIENCY ANEMIA, UNSPECIFIED IRON DEFICIENCY ANEMIA TYPE: ICD-10-CM

## 2025-04-08 LAB — TSH RECEP AB SER-ACNC: <1.1 IU/L (ref 0–1.75)

## 2025-04-08 RX ORDER — FERROUS SULFATE 325(65) MG
325 TABLET ORAL
Qty: 60 TABLET | Refills: 0 | Status: SHIPPED | OUTPATIENT
Start: 2025-04-08

## 2025-04-08 RX ORDER — CEPHALEXIN 500 MG/1
500 CAPSULE ORAL 2 TIMES DAILY
Qty: 14 CAPSULE | Refills: 0 | Status: SHIPPED | OUTPATIENT
Start: 2025-04-08 | End: 2025-04-15

## 2025-04-10 DIAGNOSIS — E87.6 HYPOKALEMIA: ICD-10-CM

## 2025-04-10 DIAGNOSIS — R25.2 CRAMPS OF LEFT LOWER EXTREMITY: ICD-10-CM

## 2025-04-10 DIAGNOSIS — I10 PRIMARY HYPERTENSION: Primary | ICD-10-CM

## 2025-04-10 RX ORDER — POTASSIUM CHLORIDE 750 MG/1
10 TABLET, EXTENDED RELEASE ORAL DAILY
Qty: 90 TABLET | Refills: 1 | Status: SHIPPED | OUTPATIENT
Start: 2025-04-10

## 2025-04-11 ENCOUNTER — RESULTS FOLLOW-UP (OUTPATIENT)
Dept: FAMILY MEDICINE CLINIC | Facility: CLINIC | Age: 60
End: 2025-04-11

## 2025-04-11 DIAGNOSIS — I10 PRIMARY HYPERTENSION: ICD-10-CM

## 2025-04-11 DIAGNOSIS — E87.6 HYPOKALEMIA: ICD-10-CM

## 2025-04-11 DIAGNOSIS — R25.2 CRAMPS OF LEFT LOWER EXTREMITY: ICD-10-CM

## 2025-04-11 DIAGNOSIS — G45.9 TRANSIENT CEREBRAL ISCHEMIC ATTACK, UNSPECIFIED: Primary | ICD-10-CM

## 2025-04-11 LAB — MAGNESIUM SERPL-MCNC: 2.2 MG/DL (ref 1.8–2.4)
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
## 2025-04-14 ENCOUNTER — HOSPITAL ENCOUNTER (OUTPATIENT)
Age: 60
Discharge: HOME | End: 2025-04-16
Payer: COMMERCIAL

## 2025-04-14 ENCOUNTER — RESULTS FOLLOW-UP (OUTPATIENT)
Dept: FAMILY MEDICINE CLINIC | Facility: CLINIC | Age: 60
End: 2025-04-14

## 2025-04-14 DIAGNOSIS — G45.9 TIA (TRANSIENT ISCHEMIC ATTACK): ICD-10-CM

## 2025-04-14 DIAGNOSIS — M25.561 RIGHT KNEE PAIN, UNSPECIFIED CHRONICITY: ICD-10-CM

## 2025-04-14 PROCEDURE — 6360000004 HC RX CONTRAST MEDICATION: Performed by: NURSE PRACTITIONER

## 2025-04-14 PROCEDURE — 70553 MRI BRAIN STEM W/O & W/DYE: CPT

## 2025-04-14 PROCEDURE — 73721 MRI JNT OF LWR EXTRE W/O DYE: CPT

## 2025-04-14 PROCEDURE — A9579 GAD-BASE MR CONTRAST NOS,1ML: HCPCS | Performed by: NURSE PRACTITIONER

## 2025-04-14 RX ADMIN — GADOTERIDOL 12 ML: 279.3 INJECTION, SOLUTION INTRAVENOUS at 09:54

## 2025-04-17 ENCOUNTER — RESULTS FOLLOW-UP (OUTPATIENT)
Dept: FAMILY MEDICINE CLINIC | Facility: CLINIC | Age: 60
End: 2025-04-17

## 2025-04-17 DIAGNOSIS — Z87.898 HISTORY OF SEIZURE: ICD-10-CM

## 2025-04-17 DIAGNOSIS — Z90.01 HISTORY OF ENUCLEATION OF LEFT EYEBALL: ICD-10-CM

## 2025-04-17 DIAGNOSIS — R93.0 ABNORMAL MRI OF HEAD: ICD-10-CM

## 2025-04-17 DIAGNOSIS — G45.9 TIA (TRANSIENT ISCHEMIC ATTACK): Primary | ICD-10-CM

## 2025-04-18 NOTE — RESULT ENCOUNTER NOTE
Patient's imaging result showed:  no stroke.  There is some abnormality mentioned in the report of increased diffusion signal.  Unclear if artifact.  She has hx of seizure activity and with her recent episode aphasic episode, I would recommend neurology evaluation.  Referred to neurology for evaluation.  If patient has any additional questions or concerns, please let me know.

## 2025-04-21 DIAGNOSIS — D50.9 IRON DEFICIENCY ANEMIA, UNSPECIFIED IRON DEFICIENCY ANEMIA TYPE: Primary | ICD-10-CM

## 2025-04-23 ENCOUNTER — CLINICAL SUPPORT (OUTPATIENT)
Dept: FAMILY MEDICINE CLINIC | Facility: CLINIC | Age: 60
End: 2025-04-23

## 2025-04-23 VITALS — SYSTOLIC BLOOD PRESSURE: 138 MMHG | OXYGEN SATURATION: 100 % | HEART RATE: 57 BPM | DIASTOLIC BLOOD PRESSURE: 84 MMHG

## 2025-04-23 DIAGNOSIS — D50.9 IRON DEFICIENCY ANEMIA, UNSPECIFIED IRON DEFICIENCY ANEMIA TYPE: ICD-10-CM

## 2025-04-23 DIAGNOSIS — I10 PRIMARY HYPERTENSION: Primary | ICD-10-CM

## 2025-04-23 LAB
HCT VFR BLD AUTO: 30.3 % (ref 35.8–46.3)
HGB BLD-MCNC: 8.6 G/DL (ref 11.7–15.4)

## 2025-04-24 ENCOUNTER — OFFICE VISIT (OUTPATIENT)
Dept: ONCOLOGY | Age: 60
End: 2025-04-24
Payer: COMMERCIAL

## 2025-04-24 ENCOUNTER — RESULTS FOLLOW-UP (OUTPATIENT)
Dept: FAMILY MEDICINE CLINIC | Facility: CLINIC | Age: 60
End: 2025-04-24

## 2025-04-24 ENCOUNTER — HOSPITAL ENCOUNTER (OUTPATIENT)
Dept: LAB | Age: 60
Discharge: HOME | End: 2025-04-24
Payer: COMMERCIAL

## 2025-04-24 VITALS
BODY MASS INDEX: 25.88 KG/M2 | SYSTOLIC BLOOD PRESSURE: 139 MMHG | HEIGHT: 62 IN | DIASTOLIC BLOOD PRESSURE: 80 MMHG | RESPIRATION RATE: 18 BRPM | TEMPERATURE: 98.1 F | WEIGHT: 140.6 LBS | OXYGEN SATURATION: 99 % | HEART RATE: 72 BPM

## 2025-04-24 DIAGNOSIS — D50.9 IRON DEFICIENCY ANEMIA, UNSPECIFIED IRON DEFICIENCY ANEMIA TYPE: Primary | ICD-10-CM

## 2025-04-24 DIAGNOSIS — K59.03 DRUG-INDUCED CONSTIPATION: ICD-10-CM

## 2025-04-24 DIAGNOSIS — D50.9 IRON DEFICIENCY ANEMIA, UNSPECIFIED IRON DEFICIENCY ANEMIA TYPE: ICD-10-CM

## 2025-04-24 LAB
ALBUMIN SERPL-MCNC: 3.4 G/DL (ref 3.5–5)
ALBUMIN/GLOB SERPL: 1 (ref 1–1.9)
ALP SERPL-CCNC: 76 U/L (ref 35–104)
ALT SERPL-CCNC: 14 U/L (ref 8–45)
ANION GAP SERPL CALC-SCNC: 13 MMOL/L (ref 7–16)
AST SERPL-CCNC: 21 U/L (ref 15–37)
BASOPHILS # BLD: 0.04 K/UL (ref 0–0.2)
BASOPHILS NFR BLD: 0.6 % (ref 0–2)
BILIRUB SERPL-MCNC: 0.3 MG/DL (ref 0–1.2)
BUN SERPL-MCNC: 8 MG/DL (ref 6–23)
CALCIUM SERPL-MCNC: 9.5 MG/DL (ref 8.8–10.2)
CHLORIDE SERPL-SCNC: 104 MMOL/L (ref 98–107)
CO2 SERPL-SCNC: 24 MMOL/L (ref 20–29)
CREAT SERPL-MCNC: 0.94 MG/DL (ref 0.6–1.1)
DIFFERENTIAL METHOD BLD: ABNORMAL
EOSINOPHIL # BLD: 0.01 K/UL (ref 0–0.8)
EOSINOPHIL NFR BLD: 0.2 % (ref 0.5–7.8)
ERYTHROCYTE [DISTWIDTH] IN BLOOD BY AUTOMATED COUNT: 17.2 % (ref 11.9–14.6)
FERRITIN SERPL-MCNC: 8 NG/ML (ref 8–388)
FOLATE SERPL-MCNC: 8.8 NG/ML (ref 3.1–17.5)
GLOBULIN SER CALC-MCNC: 3.4 G/DL (ref 2.3–3.5)
GLUCOSE SERPL-MCNC: 110 MG/DL (ref 70–99)
HCT VFR BLD AUTO: 31.9 % (ref 35.8–46.3)
HGB BLD-MCNC: 9.2 G/DL (ref 11.7–15.4)
HGB RETIC QN AUTO: 21 PG (ref 29–35)
IMM GRANULOCYTES # BLD AUTO: 0.02 K/UL (ref 0–0.5)
IMM GRANULOCYTES NFR BLD AUTO: 0.3 % (ref 0–5)
IMM RETICS NFR: 18.1 % (ref 3–15.9)
LYMPHOCYTES # BLD: 0.98 K/UL (ref 0.5–4.6)
LYMPHOCYTES NFR BLD: 15.6 % (ref 13–44)
MCH RBC QN AUTO: 23.5 PG (ref 26.1–32.9)
MCHC RBC AUTO-ENTMCNC: 28.8 G/DL (ref 31.4–35)
MCV RBC AUTO: 81.4 FL (ref 82–102)
MONOCYTES # BLD: 0.37 K/UL (ref 0.1–1.3)
MONOCYTES NFR BLD: 5.9 % (ref 4–12)
NEUTS SEG # BLD: 4.88 K/UL (ref 1.7–8.2)
NEUTS SEG NFR BLD: 77.4 % (ref 43–78)
NRBC # BLD: 0 K/UL (ref 0–0.2)
PLATELET # BLD AUTO: 330 K/UL (ref 150–450)
PLATELET COMMENT: ADEQUATE
PMV BLD AUTO: 8.8 FL (ref 9.4–12.3)
POTASSIUM SERPL-SCNC: 3.9 MMOL/L (ref 3.5–5.1)
PROT SERPL-MCNC: 6.9 G/DL (ref 6.3–8.2)
RBC # BLD AUTO: 3.92 M/UL (ref 4.05–5.2)
RBC MORPH BLD: ABNORMAL
RETICS # AUTO: 0.04 M/UL (ref 0.03–0.1)
RETICS/RBC NFR AUTO: 1.1 % (ref 0.3–2)
SODIUM SERPL-SCNC: 140 MMOL/L (ref 136–145)
TRANSFERRIN SERPL-MCNC: 307 MG/DL (ref 200–360)
VIT B12 SERPL-MCNC: 556 PG/ML (ref 193–986)
WBC # BLD AUTO: 6.3 K/UL (ref 4.3–11.1)
WBC MORPH BLD: ABNORMAL

## 2025-04-24 PROCEDURE — 80053 COMPREHEN METABOLIC PANEL: CPT

## 2025-04-24 PROCEDURE — 99204 OFFICE O/P NEW MOD 45 MIN: CPT | Performed by: INTERNAL MEDICINE

## 2025-04-24 PROCEDURE — 85046 RETICYTE/HGB CONCENTRATE: CPT

## 2025-04-24 PROCEDURE — 3079F DIAST BP 80-89 MM HG: CPT | Performed by: INTERNAL MEDICINE

## 2025-04-24 PROCEDURE — 82728 ASSAY OF FERRITIN: CPT

## 2025-04-24 PROCEDURE — 82607 VITAMIN B-12: CPT

## 2025-04-24 PROCEDURE — 3075F SYST BP GE 130 - 139MM HG: CPT | Performed by: INTERNAL MEDICINE

## 2025-04-24 PROCEDURE — 82746 ASSAY OF FOLIC ACID SERUM: CPT

## 2025-04-24 PROCEDURE — 36415 COLL VENOUS BLD VENIPUNCTURE: CPT

## 2025-04-24 PROCEDURE — 85025 COMPLETE CBC W/AUTO DIFF WBC: CPT

## 2025-04-24 PROCEDURE — 84466 ASSAY OF TRANSFERRIN: CPT

## 2025-04-24 RX ORDER — HEPARIN SODIUM (PORCINE) LOCK FLUSH IV SOLN 100 UNIT/ML 100 UNIT/ML
500 SOLUTION INTRAVENOUS PRN
OUTPATIENT
Start: 2025-04-28

## 2025-04-24 RX ORDER — ONDANSETRON 2 MG/ML
8 INJECTION INTRAMUSCULAR; INTRAVENOUS
OUTPATIENT
Start: 2025-04-28

## 2025-04-24 RX ORDER — SODIUM CHLORIDE 9 MG/ML
5-250 INJECTION, SOLUTION INTRAVENOUS PRN
OUTPATIENT
Start: 2025-04-28

## 2025-04-24 RX ORDER — HYDROCORTISONE SODIUM SUCCINATE 100 MG/2ML
100 INJECTION INTRAMUSCULAR; INTRAVENOUS
OUTPATIENT
Start: 2025-04-28

## 2025-04-24 RX ORDER — SODIUM CHLORIDE 0.9 % (FLUSH) 0.9 %
5-40 SYRINGE (ML) INJECTION PRN
OUTPATIENT
Start: 2025-04-28

## 2025-04-24 RX ORDER — AMITRIPTYLINE HYDROCHLORIDE 50 MG/1
50 TABLET ORAL NIGHTLY
COMMUNITY

## 2025-04-24 RX ORDER — ACETAMINOPHEN 325 MG/1
650 TABLET ORAL
OUTPATIENT
Start: 2025-04-28

## 2025-04-24 RX ORDER — EPINEPHRINE 1 MG/ML
0.3 INJECTION, SOLUTION, CONCENTRATE INTRAVENOUS PRN
OUTPATIENT
Start: 2025-04-28

## 2025-04-24 RX ORDER — BUPRENORPHINE HYDROCHLORIDE AND NALOXONE HYDROCHLORIDE DIHYDRATE 2; .5 MG/1; MG/1
TABLET SUBLINGUAL
COMMUNITY
Start: 2025-03-25 | End: 2025-04-24

## 2025-04-24 RX ORDER — DIPHENHYDRAMINE HYDROCHLORIDE 50 MG/ML
50 INJECTION, SOLUTION INTRAMUSCULAR; INTRAVENOUS
OUTPATIENT
Start: 2025-04-28

## 2025-04-24 RX ORDER — SODIUM CHLORIDE 9 MG/ML
INJECTION, SOLUTION INTRAVENOUS CONTINUOUS
OUTPATIENT
Start: 2025-04-28

## 2025-04-24 RX ORDER — FAMOTIDINE 10 MG/ML
20 INJECTION, SOLUTION INTRAVENOUS
OUTPATIENT
Start: 2025-04-28

## 2025-04-24 RX ORDER — ALBUTEROL SULFATE 90 UG/1
4 INHALANT RESPIRATORY (INHALATION) PRN
OUTPATIENT
Start: 2025-04-28

## 2025-04-24 ASSESSMENT — PATIENT HEALTH QUESTIONNAIRE - PHQ9
9. THOUGHTS THAT YOU WOULD BE BETTER OFF DEAD, OR OF HURTING YOURSELF: NOT AT ALL
8. MOVING OR SPEAKING SO SLOWLY THAT OTHER PEOPLE COULD HAVE NOTICED. OR THE OPPOSITE, BEING SO FIGETY OR RESTLESS THAT YOU HAVE BEEN MOVING AROUND A LOT MORE THAN USUAL: NOT AT ALL
SUM OF ALL RESPONSES TO PHQ QUESTIONS 1-9: 14
1. LITTLE INTEREST OR PLEASURE IN DOING THINGS: NEARLY EVERY DAY
10. IF YOU CHECKED OFF ANY PROBLEMS, HOW DIFFICULT HAVE THESE PROBLEMS MADE IT FOR YOU TO DO YOUR WORK, TAKE CARE OF THINGS AT HOME, OR GET ALONG WITH OTHER PEOPLE: VERY DIFFICULT
3. TROUBLE FALLING OR STAYING ASLEEP: SEVERAL DAYS
2. FEELING DOWN, DEPRESSED OR HOPELESS: NEARLY EVERY DAY
7. TROUBLE CONCENTRATING ON THINGS, SUCH AS READING THE NEWSPAPER OR WATCHING TELEVISION: NOT AT ALL
SUM OF ALL RESPONSES TO PHQ QUESTIONS 1-9: 14
SUM OF ALL RESPONSES TO PHQ QUESTIONS 1-9: 14
5. POOR APPETITE OR OVEREATING: SEVERAL DAYS
6. FEELING BAD ABOUT YOURSELF - OR THAT YOU ARE A FAILURE OR HAVE LET YOURSELF OR YOUR FAMILY DOWN: NEARLY EVERY DAY
4. FEELING TIRED OR HAVING LITTLE ENERGY: NEARLY EVERY DAY
SUM OF ALL RESPONSES TO PHQ QUESTIONS 1-9: 14

## 2025-04-24 NOTE — PATIENT INSTRUCTIONS
Patient Information from Today's Visit    The members of your [facility] are listed below:    Physician Provider: Dr. Susy Cristina  Advanced Practice Clinician: Aurea Dunbar  Registered Nurse: BRYANT  Nurse Navigator: N/A  Medical Assistant: Kai SANDOVAL   : Aishwarya \"Ewelina\" JB.   Supportive Care Services: PILO Sepulveda    Diagnosis (Information Sheet Provided on Day of Diagnosis): New Patient --ROHAN    Follow Up Instructions: we will arrange 2 doses of IV iron and then see you back in 3 months to re-check your labs.    -Labs and past medical history reviewed.     Has Treatment Plan Been Finalized? N/A     Current Labs:   Hospital Outpatient Visit on 04/24/2025   Component Date Value Ref Range Status    WBC 04/24/2025 6.3  4.3 - 11.1 K/uL Final    Comment: RESULTS CHECKED X 2  PERIPHERAL REVIEW TO FOLLOW      RBC 04/24/2025 3.92 (L)  4.05 - 5.2 M/uL Final    Hemoglobin 04/24/2025 9.2 (L)  11.7 - 15.4 g/dL Final    Hematocrit 04/24/2025 31.9 (L)  35.8 - 46.3 % Final    MCV 04/24/2025 81.4 (L)  82.0 - 102.0 FL Final    MCH 04/24/2025 23.5 (L)  26.1 - 32.9 PG Final    MCHC 04/24/2025 28.8 (L)  31.4 - 35.0 g/dL Final    RDW 04/24/2025 17.2 (H)  11.9 - 14.6 % Final    Platelets 04/24/2025 330  150 - 450 K/uL Final    MPV 04/24/2025 8.8 (L)  9.4 - 12.3 FL Final    nRBC 04/24/2025 0.00  0.0 - 0.2 K/uL Final    **Note: Absolute NRBC parameter is now reported with Hemogram**    Neutrophils % 04/24/2025 77.4  43.0 - 78.0 % Final    Lymphocytes % 04/24/2025 15.6  13.0 - 44.0 % Final    Monocytes % 04/24/2025 5.9  4.0 - 12.0 % Final    Eosinophils % 04/24/2025 0.2 (L)  0.5 - 7.8 % Final    Basophils % 04/24/2025 0.6  0.0 - 2.0 % Final    Immature Granulocytes % 04/24/2025 0.3  0.0 - 5.0 % Final    Neutrophils Absolute 04/24/2025 4.88  1.70 - 8.20 K/UL Final    Lymphocytes Absolute 04/24/2025 0.98  0.50 - 4.60 K/UL Final    Monocytes Absolute 04/24/2025 0.37  0.10 - 1.30 K/UL Final    Eosinophils Absolute
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
## 2025-04-24 NOTE — RESULT ENCOUNTER NOTE
Anemia improved, continue the iron supplement and I see she has appt with hematology.  Please let me know if pt has any additional questions.  Keep scheduled appointment.

## 2025-04-25 ENCOUNTER — OFFICE VISIT (OUTPATIENT)
Dept: FAMILY MEDICINE CLINIC | Facility: CLINIC | Age: 60
End: 2025-04-25
Payer: COMMERCIAL

## 2025-04-25 VITALS
HEIGHT: 62 IN | TEMPERATURE: 99 F | BODY MASS INDEX: 25.76 KG/M2 | DIASTOLIC BLOOD PRESSURE: 74 MMHG | SYSTOLIC BLOOD PRESSURE: 128 MMHG | HEART RATE: 70 BPM | WEIGHT: 140 LBS | OXYGEN SATURATION: 99 %

## 2025-04-25 DIAGNOSIS — G45.9 TIA (TRANSIENT ISCHEMIC ATTACK): ICD-10-CM

## 2025-04-25 DIAGNOSIS — J98.8 CONGESTION OF UPPER AIRWAY: ICD-10-CM

## 2025-04-25 DIAGNOSIS — F32.1 CURRENT MODERATE EPISODE OF MAJOR DEPRESSIVE DISORDER WITHOUT PRIOR EPISODE (HCC): Primary | ICD-10-CM

## 2025-04-25 DIAGNOSIS — M79.89 LEG SWELLING: ICD-10-CM

## 2025-04-25 DIAGNOSIS — F41.1 GAD (GENERALIZED ANXIETY DISORDER): ICD-10-CM

## 2025-04-25 DIAGNOSIS — J00 ACUTE RHINITIS: ICD-10-CM

## 2025-04-25 DIAGNOSIS — R05.1 ACUTE COUGH: ICD-10-CM

## 2025-04-25 PROCEDURE — 3074F SYST BP LT 130 MM HG: CPT | Performed by: NURSE PRACTITIONER

## 2025-04-25 PROCEDURE — 3078F DIAST BP <80 MM HG: CPT | Performed by: NURSE PRACTITIONER

## 2025-04-25 PROCEDURE — 99214 OFFICE O/P EST MOD 30 MIN: CPT | Performed by: NURSE PRACTITIONER

## 2025-04-25 RX ORDER — BENZONATATE 100 MG/1
100-200 CAPSULE ORAL 2 TIMES DAILY PRN
Qty: 28 CAPSULE | Refills: 0 | Status: SHIPPED | OUTPATIENT
Start: 2025-04-25 | End: 2025-05-02

## 2025-04-25 RX ORDER — GUAIFENESIN 600 MG/1
600 TABLET, EXTENDED RELEASE ORAL 2 TIMES DAILY PRN
Qty: 30 TABLET | Refills: 0 | Status: SHIPPED | OUTPATIENT
Start: 2025-04-25 | End: 2025-05-10

## 2025-04-25 RX ORDER — HYDROXYZINE HYDROCHLORIDE 50 MG/1
25-50 TABLET, FILM COATED ORAL EVERY 8 HOURS PRN
Qty: 90 TABLET | Refills: 1 | Status: SHIPPED | OUTPATIENT
Start: 2025-04-25 | End: 2025-06-24

## 2025-04-25 RX ORDER — FLUTICASONE PROPIONATE 50 MCG
2 SPRAY, SUSPENSION (ML) NASAL DAILY
Qty: 16 G | Refills: 1 | Status: SHIPPED | OUTPATIENT
Start: 2025-04-25

## 2025-04-25 ASSESSMENT — ENCOUNTER SYMPTOMS
SORE THROAT: 1
COUGH: 1

## 2025-04-25 ASSESSMENT — ANXIETY QUESTIONNAIRES: 1. FEELING NERVOUS, ANXIOUS, OR ON EDGE: NEARLY EVERY DAY

## 2025-04-25 NOTE — ASSESSMENT & PLAN NOTE
Chronic, not at goal (unstable), continue current treatment plan  Prefers to go to counseling prior to any adjustments to Prozac.

## 2025-04-25 NOTE — ASSESSMENT & PLAN NOTE
Chronic, not at goal (unstable), changes made today:      Orders:    hydrOXYzine HCl (ATARAX) 50 MG tablet; Take 0.5-1 tablets by mouth every 8 hours as needed for Anxiety

## 2025-04-25 NOTE — PROGRESS NOTES
NOTICE FOR THE PATIENT: This clinical note is not designed to be interpreted by patients.  These notes may contain candid and (unintentionally) offensive descriptions, which are sometimes required for accurate documentation. If you would like more information about your healthcare, please obtain it directly by myself or my staff. Thank you for your understanding and cooperation.     Sierra Leach is a 59 y.o. female who presents today for the following:  Chief Complaint   Patient presents with    Anxiety     Congestion for two days coughing up green mucus.          No Known Allergies    Current Outpatient Medications   Medication Sig Dispense Refill    hydrOXYzine HCl (ATARAX) 50 MG tablet Take 0.5-1 tablets by mouth every 8 hours as needed for Anxiety 90 tablet 1    benzonatate (TESSALON) 100 MG capsule Take 1-2 capsules by mouth 2 times daily as needed for Cough 28 capsule 0    fluticasone (FLONASE) 50 MCG/ACT nasal spray 2 sprays by Each Nostril route daily 16 g 1    guaiFENesin (MUCINEX) 600 MG extended release tablet Take 1 tablet by mouth 2 times daily as needed for Congestion 30 tablet 0    potassium chloride (KLOR-CON M) 10 MEQ extended release tablet Take 1 tablet by mouth daily 90 tablet 1    ferrous sulfate (IRON 325) 325 (65 Fe) MG tablet Take 1 tablet by mouth daily (with breakfast) 60 tablet 0    ascorbic acid (V-R VITAMIN C) 250 MG tablet Take 1 tablet by mouth daily 60 tablet 0    divalproex (DEPAKOTE ER) 500 MG extended release tablet Take 2 tablets by mouth daily      buprenorphine (SUBUTEX) 2 MG SUBL SL tablet Place under the tongue daily.      hydroCHLOROthiazide 12.5 MG capsule Take 1 capsule by mouth every morning 90 capsule 1    traZODone (DESYREL) 50 MG tablet Take 0.5-1 tablets by mouth nightly as needed for Sleep Take 1/2 tablet while starting fluoxetine for 1 week and then increase to 50 mg if needed thereafter. 90 tablet 2    lisinopril (PRINIVIL;ZESTRIL) 5 MG tablet Take 1 tablet

## 2025-05-02 ENCOUNTER — HOSPITAL ENCOUNTER (OUTPATIENT)
Dept: INFUSION THERAPY | Age: 60
Setting detail: INFUSION SERIES
Discharge: HOME | End: 2025-05-02
Payer: COMMERCIAL

## 2025-05-02 VITALS
DIASTOLIC BLOOD PRESSURE: 87 MMHG | TEMPERATURE: 97.8 F | HEART RATE: 77 BPM | SYSTOLIC BLOOD PRESSURE: 133 MMHG | OXYGEN SATURATION: 98 % | RESPIRATION RATE: 16 BRPM

## 2025-05-02 DIAGNOSIS — D50.9 IRON DEFICIENCY ANEMIA, UNSPECIFIED IRON DEFICIENCY ANEMIA TYPE: Primary | ICD-10-CM

## 2025-05-02 PROCEDURE — 2580000003 HC RX 258: Performed by: INTERNAL MEDICINE

## 2025-05-02 PROCEDURE — 6360000002 HC RX W HCPCS: Performed by: INTERNAL MEDICINE

## 2025-05-02 PROCEDURE — 2500000003 HC RX 250 WO HCPCS: Performed by: INTERNAL MEDICINE

## 2025-05-02 PROCEDURE — 96365 THER/PROPH/DIAG IV INF INIT: CPT

## 2025-05-02 RX ORDER — EPINEPHRINE 1 MG/ML
0.3 INJECTION, SOLUTION, CONCENTRATE INTRAVENOUS PRN
Status: DISCONTINUED | OUTPATIENT
Start: 2025-05-02 | End: 2025-05-03 | Stop reason: HOSPADM

## 2025-05-02 RX ORDER — EPINEPHRINE 1 MG/ML
0.3 INJECTION, SOLUTION, CONCENTRATE INTRAVENOUS PRN
OUTPATIENT
Start: 2025-05-09

## 2025-05-02 RX ORDER — HEPARIN 100 UNIT/ML
500 SYRINGE INTRAVENOUS PRN
OUTPATIENT
Start: 2025-05-09

## 2025-05-02 RX ORDER — SODIUM CHLORIDE 9 MG/ML
INJECTION, SOLUTION INTRAVENOUS CONTINUOUS
OUTPATIENT
Start: 2025-05-09

## 2025-05-02 RX ORDER — ALBUTEROL SULFATE 90 UG/1
4 INHALANT RESPIRATORY (INHALATION) PRN
Status: DISCONTINUED | OUTPATIENT
Start: 2025-05-02 | End: 2025-05-03 | Stop reason: HOSPADM

## 2025-05-02 RX ORDER — ALBUTEROL SULFATE 90 UG/1
4 INHALANT RESPIRATORY (INHALATION) PRN
OUTPATIENT
Start: 2025-05-09

## 2025-05-02 RX ORDER — ONDANSETRON 2 MG/ML
8 INJECTION INTRAMUSCULAR; INTRAVENOUS
Status: DISCONTINUED | OUTPATIENT
Start: 2025-05-02 | End: 2025-05-03 | Stop reason: HOSPADM

## 2025-05-02 RX ORDER — DIPHENHYDRAMINE HYDROCHLORIDE 50 MG/ML
50 INJECTION, SOLUTION INTRAMUSCULAR; INTRAVENOUS
OUTPATIENT
Start: 2025-05-09

## 2025-05-02 RX ORDER — SODIUM CHLORIDE 9 MG/ML
5-250 INJECTION, SOLUTION INTRAVENOUS PRN
OUTPATIENT
Start: 2025-05-09

## 2025-05-02 RX ORDER — ACETAMINOPHEN 325 MG/1
650 TABLET ORAL
Status: DISCONTINUED | OUTPATIENT
Start: 2025-05-02 | End: 2025-05-03 | Stop reason: HOSPADM

## 2025-05-02 RX ORDER — SODIUM CHLORIDE 0.9 % (FLUSH) 0.9 %
5-40 SYRINGE (ML) INJECTION PRN
Status: DISCONTINUED | OUTPATIENT
Start: 2025-05-02 | End: 2025-05-03 | Stop reason: HOSPADM

## 2025-05-02 RX ORDER — ACETAMINOPHEN 325 MG/1
650 TABLET ORAL
OUTPATIENT
Start: 2025-05-09

## 2025-05-02 RX ORDER — HYDROCORTISONE SODIUM SUCCINATE 100 MG/2ML
100 INJECTION INTRAMUSCULAR; INTRAVENOUS
Status: DISCONTINUED | OUTPATIENT
Start: 2025-05-02 | End: 2025-05-03 | Stop reason: HOSPADM

## 2025-05-02 RX ORDER — HYDROCORTISONE SODIUM SUCCINATE 100 MG/2ML
100 INJECTION INTRAMUSCULAR; INTRAVENOUS
OUTPATIENT
Start: 2025-05-09

## 2025-05-02 RX ORDER — DIPHENHYDRAMINE HYDROCHLORIDE 50 MG/ML
50 INJECTION, SOLUTION INTRAMUSCULAR; INTRAVENOUS
Status: DISCONTINUED | OUTPATIENT
Start: 2025-05-02 | End: 2025-05-03 | Stop reason: HOSPADM

## 2025-05-02 RX ORDER — ONDANSETRON 2 MG/ML
8 INJECTION INTRAMUSCULAR; INTRAVENOUS
OUTPATIENT
Start: 2025-05-09

## 2025-05-02 RX ORDER — SODIUM CHLORIDE 0.9 % (FLUSH) 0.9 %
5-40 SYRINGE (ML) INJECTION PRN
OUTPATIENT
Start: 2025-05-09

## 2025-05-02 RX ADMIN — SODIUM CHLORIDE, PRESERVATIVE FREE 10 ML: 5 INJECTION INTRAVENOUS at 15:43

## 2025-05-02 RX ADMIN — FERUMOXYTOL 510 MG: 510 INJECTION INTRAVENOUS at 15:54

## 2025-05-02 NOTE — PROGRESS NOTES
Patient arrived to infusion. Feraheme completed. Patient tolerated without difficulty. Monitored for 30 minutes. VSS. PIV removed. Discharged ambulatory. Patient aware of next infusion appt on 5/5.

## 2025-05-09 ENCOUNTER — HOSPITAL ENCOUNTER (OUTPATIENT)
Dept: INFUSION THERAPY | Age: 60
Setting detail: INFUSION SERIES
Discharge: HOME | End: 2025-05-09
Payer: COMMERCIAL

## 2025-05-09 VITALS
TEMPERATURE: 97.9 F | OXYGEN SATURATION: 97 % | RESPIRATION RATE: 16 BRPM | DIASTOLIC BLOOD PRESSURE: 79 MMHG | HEART RATE: 78 BPM | SYSTOLIC BLOOD PRESSURE: 134 MMHG

## 2025-05-09 DIAGNOSIS — D50.9 IRON DEFICIENCY ANEMIA, UNSPECIFIED IRON DEFICIENCY ANEMIA TYPE: Primary | ICD-10-CM

## 2025-05-09 PROCEDURE — 96365 THER/PROPH/DIAG IV INF INIT: CPT

## 2025-05-09 PROCEDURE — 6360000002 HC RX W HCPCS: Performed by: INTERNAL MEDICINE

## 2025-05-09 PROCEDURE — 2500000003 HC RX 250 WO HCPCS: Performed by: INTERNAL MEDICINE

## 2025-05-09 PROCEDURE — 2580000003 HC RX 258: Performed by: INTERNAL MEDICINE

## 2025-05-09 RX ORDER — SODIUM CHLORIDE 9 MG/ML
5-250 INJECTION, SOLUTION INTRAVENOUS PRN
OUTPATIENT
Start: 2025-05-16

## 2025-05-09 RX ORDER — ONDANSETRON 2 MG/ML
8 INJECTION INTRAMUSCULAR; INTRAVENOUS
OUTPATIENT
Start: 2025-05-16

## 2025-05-09 RX ORDER — ALBUTEROL SULFATE 90 UG/1
4 INHALANT RESPIRATORY (INHALATION) PRN
OUTPATIENT
Start: 2025-05-16

## 2025-05-09 RX ORDER — SODIUM CHLORIDE 0.9 % (FLUSH) 0.9 %
5-40 SYRINGE (ML) INJECTION PRN
Status: DISCONTINUED | OUTPATIENT
Start: 2025-05-09 | End: 2025-05-10 | Stop reason: HOSPADM

## 2025-05-09 RX ORDER — ALBUTEROL SULFATE 90 UG/1
4 INHALANT RESPIRATORY (INHALATION) PRN
Status: DISCONTINUED | OUTPATIENT
Start: 2025-05-09 | End: 2025-05-10 | Stop reason: HOSPADM

## 2025-05-09 RX ORDER — HYDROCORTISONE SODIUM SUCCINATE 100 MG/2ML
100 INJECTION INTRAMUSCULAR; INTRAVENOUS
Status: DISCONTINUED | OUTPATIENT
Start: 2025-05-09 | End: 2025-05-10 | Stop reason: HOSPADM

## 2025-05-09 RX ORDER — SODIUM CHLORIDE 9 MG/ML
5-250 INJECTION, SOLUTION INTRAVENOUS PRN
Status: DISCONTINUED | OUTPATIENT
Start: 2025-05-09 | End: 2025-05-10 | Stop reason: HOSPADM

## 2025-05-09 RX ORDER — SODIUM CHLORIDE 0.9 % (FLUSH) 0.9 %
5-40 SYRINGE (ML) INJECTION PRN
OUTPATIENT
Start: 2025-05-16

## 2025-05-09 RX ORDER — EPINEPHRINE 1 MG/ML
0.3 INJECTION, SOLUTION, CONCENTRATE INTRAVENOUS PRN
Status: DISCONTINUED | OUTPATIENT
Start: 2025-05-09 | End: 2025-05-10 | Stop reason: HOSPADM

## 2025-05-09 RX ORDER — EPINEPHRINE 1 MG/ML
0.3 INJECTION, SOLUTION, CONCENTRATE INTRAVENOUS PRN
OUTPATIENT
Start: 2025-05-16

## 2025-05-09 RX ORDER — ONDANSETRON 2 MG/ML
8 INJECTION INTRAMUSCULAR; INTRAVENOUS
Status: DISCONTINUED | OUTPATIENT
Start: 2025-05-09 | End: 2025-05-10 | Stop reason: HOSPADM

## 2025-05-09 RX ORDER — ACETAMINOPHEN 325 MG/1
650 TABLET ORAL
Status: DISCONTINUED | OUTPATIENT
Start: 2025-05-09 | End: 2025-05-10 | Stop reason: HOSPADM

## 2025-05-09 RX ORDER — ACETAMINOPHEN 325 MG/1
650 TABLET ORAL
OUTPATIENT
Start: 2025-05-16

## 2025-05-09 RX ORDER — DIPHENHYDRAMINE HYDROCHLORIDE 50 MG/ML
50 INJECTION, SOLUTION INTRAMUSCULAR; INTRAVENOUS
Status: DISCONTINUED | OUTPATIENT
Start: 2025-05-09 | End: 2025-05-10 | Stop reason: HOSPADM

## 2025-05-09 RX ORDER — HYDROCORTISONE SODIUM SUCCINATE 100 MG/2ML
100 INJECTION INTRAMUSCULAR; INTRAVENOUS
OUTPATIENT
Start: 2025-05-16

## 2025-05-09 RX ORDER — HEPARIN 100 UNIT/ML
500 SYRINGE INTRAVENOUS PRN
OUTPATIENT
Start: 2025-05-16

## 2025-05-09 RX ORDER — DIPHENHYDRAMINE HYDROCHLORIDE 50 MG/ML
50 INJECTION, SOLUTION INTRAMUSCULAR; INTRAVENOUS
OUTPATIENT
Start: 2025-05-16

## 2025-05-09 RX ORDER — SODIUM CHLORIDE 9 MG/ML
INJECTION, SOLUTION INTRAVENOUS CONTINUOUS
OUTPATIENT
Start: 2025-05-16

## 2025-05-09 RX ADMIN — FERUMOXYTOL 510 MG: 510 INJECTION INTRAVENOUS at 14:38

## 2025-05-09 RX ADMIN — SODIUM CHLORIDE, PRESERVATIVE FREE 10 ML: 5 INJECTION INTRAVENOUS at 14:58

## 2025-05-09 RX ADMIN — SODIUM CHLORIDE, PRESERVATIVE FREE 10 ML: 5 INJECTION INTRAVENOUS at 14:15

## 2025-05-09 NOTE — PROGRESS NOTES
Arrived to the Infusion Center.  Feraheme and 30 min obs completed. Patient tolerated well.   Patient aware of next lab and [facility] office visit on 7/24/25.   Patient instructed to call provider with temperature of 100.4 or greater or nausea/vomiting/ diarrhea or pain not controlled by medications  Discharged ambulatory.

## 2025-05-16 ENCOUNTER — TELEPHONE (OUTPATIENT)
Age: 60
End: 2025-05-16

## 2025-05-16 NOTE — TELEPHONE ENCOUNTER
Received FAX'd [facility] report showing 28 episodes of tachycardia (4 beats or more) with HR- with average HR-133. Report has been downloaded into medical record.

## 2025-05-16 NOTE — TELEPHONE ENCOUNTER
Kunal Cat MD Keener, Lynn F RN  Caller: Unspecified (Today, 12:48 PM)  It appears this was a test only and not a patient of Dr. Alston.  The triage heart rates that were being called about were documented on 28 April.  Patient could be set up for a consult with one of the partners here to discuss her tachycardia.  This does not appear to be anything critical or hemodynamically significant and again was originally a test only so she has not even been referred to us for evaluation

## 2025-05-17 DIAGNOSIS — F41.1 GAD (GENERALIZED ANXIETY DISORDER): ICD-10-CM

## 2025-05-19 RX ORDER — HYDROXYZINE HYDROCHLORIDE 50 MG/1
TABLET, FILM COATED ORAL
Qty: 270 TABLET | Refills: 1 | OUTPATIENT
Start: 2025-05-19

## 2025-05-30 DIAGNOSIS — D50.9 IRON DEFICIENCY ANEMIA, UNSPECIFIED IRON DEFICIENCY ANEMIA TYPE: ICD-10-CM

## 2025-06-04 DIAGNOSIS — F32.1 CURRENT MODERATE EPISODE OF MAJOR DEPRESSIVE DISORDER WITHOUT PRIOR EPISODE (HCC): ICD-10-CM

## 2025-06-04 DIAGNOSIS — F32.9 REACTIVE DEPRESSION: ICD-10-CM

## 2025-06-04 DIAGNOSIS — F51.04 PSYCHOPHYSIOLOGICAL INSOMNIA: ICD-10-CM

## 2025-06-04 RX ORDER — TRAZODONE HYDROCHLORIDE 50 MG/1
TABLET ORAL
Qty: 90 TABLET | Refills: 2 | OUTPATIENT
Start: 2025-06-04

## 2025-06-04 RX ORDER — FERROUS SULFATE 325(65) MG
1 TABLET ORAL
Qty: 90 TABLET | Refills: 1 | OUTPATIENT
Start: 2025-06-04
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
## 2025-07-15 ASSESSMENT — ANXIETY QUESTIONNAIRES
5. BEING SO RESTLESS THAT IT IS HARD TO SIT STILL: MORE THAN HALF THE DAYS
5. BEING SO RESTLESS THAT IT IS HARD TO SIT STILL: MORE THAN HALF THE DAYS
4. TROUBLE RELAXING: MORE THAN HALF THE DAYS
6. BECOMING EASILY ANNOYED OR IRRITABLE: MORE THAN HALF THE DAYS
2. NOT BEING ABLE TO STOP OR CONTROL WORRYING: NEARLY EVERY DAY
6. BECOMING EASILY ANNOYED OR IRRITABLE: MORE THAN HALF THE DAYS
3. WORRYING TOO MUCH ABOUT DIFFERENT THINGS: MORE THAN HALF THE DAYS
IF YOU CHECKED OFF ANY PROBLEMS ON THIS QUESTIONNAIRE, HOW DIFFICULT HAVE THESE PROBLEMS MADE IT FOR YOU TO DO YOUR WORK, TAKE CARE OF THINGS AT HOME, OR GET ALONG WITH OTHER PEOPLE: SOMEWHAT DIFFICULT
1. FEELING NERVOUS, ANXIOUS, OR ON EDGE: NEARLY EVERY DAY
7. FEELING AFRAID AS IF SOMETHING AWFUL MIGHT HAPPEN: SEVERAL DAYS
GAD7 TOTAL SCORE: 15
4. TROUBLE RELAXING: MORE THAN HALF THE DAYS
1. FEELING NERVOUS, ANXIOUS, OR ON EDGE: NEARLY EVERY DAY
2. NOT BEING ABLE TO STOP OR CONTROL WORRYING: NEARLY EVERY DAY
3. WORRYING TOO MUCH ABOUT DIFFERENT THINGS: MORE THAN HALF THE DAYS
7. FEELING AFRAID AS IF SOMETHING AWFUL MIGHT HAPPEN: SEVERAL DAYS
IF YOU CHECKED OFF ANY PROBLEMS ON THIS QUESTIONNAIRE, HOW DIFFICULT HAVE THESE PROBLEMS MADE IT FOR YOU TO DO YOUR WORK, TAKE CARE OF THINGS AT HOME, OR GET ALONG WITH OTHER PEOPLE: SOMEWHAT DIFFICULT

## 2025-07-15 ASSESSMENT — PATIENT HEALTH QUESTIONNAIRE - PHQ9
8. MOVING OR SPEAKING SO SLOWLY THAT OTHER PEOPLE COULD HAVE NOTICED. OR THE OPPOSITE, BEING SO FIGETY OR RESTLESS THAT YOU HAVE BEEN MOVING AROUND A LOT MORE THAN USUAL: SEVERAL DAYS
7. TROUBLE CONCENTRATING ON THINGS, SUCH AS READING THE NEWSPAPER OR WATCHING TELEVISION: MORE THAN HALF THE DAYS
9. THOUGHTS THAT YOU WOULD BE BETTER OFF DEAD, OR OF HURTING YOURSELF: NOT AT ALL
9. THOUGHTS THAT YOU WOULD BE BETTER OFF DEAD, OR OF HURTING YOURSELF: NOT AT ALL
4. FEELING TIRED OR HAVING LITTLE ENERGY: MORE THAN HALF THE DAYS
10. IF YOU CHECKED OFF ANY PROBLEMS, HOW DIFFICULT HAVE THESE PROBLEMS MADE IT FOR YOU TO DO YOUR WORK, TAKE CARE OF THINGS AT HOME, OR GET ALONG WITH OTHER PEOPLE: VERY DIFFICULT
SUM OF ALL RESPONSES TO PHQ QUESTIONS 1-9: 19
4. FEELING TIRED OR HAVING LITTLE ENERGY: MORE THAN HALF THE DAYS
SUM OF ALL RESPONSES TO PHQ QUESTIONS 1-9: 19
5. POOR APPETITE OR OVEREATING: NEARLY EVERY DAY
SUM OF ALL RESPONSES TO PHQ QUESTIONS 1-9: 19
3. TROUBLE FALLING OR STAYING ASLEEP: NEARLY EVERY DAY
SUM OF ALL RESPONSES TO PHQ QUESTIONS 1-9: 19
1. LITTLE INTEREST OR PLEASURE IN DOING THINGS: MORE THAN HALF THE DAYS
2. FEELING DOWN, DEPRESSED OR HOPELESS: NEARLY EVERY DAY
SUM OF ALL RESPONSES TO PHQ QUESTIONS 1-9: 19
5. POOR APPETITE OR OVEREATING: NEARLY EVERY DAY
2. FEELING DOWN, DEPRESSED OR HOPELESS: NEARLY EVERY DAY
3. TROUBLE FALLING OR STAYING ASLEEP: NEARLY EVERY DAY
7. TROUBLE CONCENTRATING ON THINGS, SUCH AS READING THE NEWSPAPER OR WATCHING TELEVISION: MORE THAN HALF THE DAYS
8. MOVING OR SPEAKING SO SLOWLY THAT OTHER PEOPLE COULD HAVE NOTICED. OR THE OPPOSITE - BEING SO FIDGETY OR RESTLESS THAT YOU HAVE BEEN MOVING AROUND A LOT MORE THAN USUAL: SEVERAL DAYS
6. FEELING BAD ABOUT YOURSELF - OR THAT YOU ARE A FAILURE OR HAVE LET YOURSELF OR YOUR FAMILY DOWN: NEARLY EVERY DAY
1. LITTLE INTEREST OR PLEASURE IN DOING THINGS: MORE THAN HALF THE DAYS
10. IF YOU CHECKED OFF ANY PROBLEMS, HOW DIFFICULT HAVE THESE PROBLEMS MADE IT FOR YOU TO DO YOUR WORK, TAKE CARE OF THINGS AT HOME, OR GET ALONG WITH OTHER PEOPLE: VERY DIFFICULT
6. FEELING BAD ABOUT YOURSELF - OR THAT YOU ARE A FAILURE OR HAVE LET YOURSELF OR YOUR FAMILY DOWN: NEARLY EVERY DAY

## 2025-07-15 ASSESSMENT — LIFESTYLE VARIABLES
PAST THREE MONTHS WHAT IS THE LARGEST AMOUNT OF ALCOHOLIC DRINKS YOU HAVE CONSUMED IN ONE DAY: 2
HISTORY_ALCOHOL_USE: NO
HAVE YOU EVER RECEIVED ALCOHOL OR OTHER DRUG ABUSE TREATMENT: NO
ALCOHOL_DAYS_PER_WEEK: 0

## 2025-07-16 ENCOUNTER — OFFICE VISIT (OUTPATIENT)
Dept: BEHAVIORAL/MENTAL HEALTH CLINIC | Age: 60
End: 2025-07-16
Payer: COMMERCIAL

## 2025-07-16 DIAGNOSIS — F43.21 COMPLICATED GRIEF: ICD-10-CM

## 2025-07-16 DIAGNOSIS — F41.1 GENERALIZED ANXIETY DISORDER: ICD-10-CM

## 2025-07-16 DIAGNOSIS — F33.1 MAJOR DEPRESSIVE DISORDER, RECURRENT, MODERATE (HCC): Primary | ICD-10-CM

## 2025-07-16 PROCEDURE — 90791 PSYCH DIAGNOSTIC EVALUATION: CPT | Performed by: SOCIAL WORKER

## 2025-07-23 ENCOUNTER — TELEPHONE (OUTPATIENT)
Dept: RADIATION ONCOLOGY | Age: 60
End: 2025-07-23

## 2025-07-23 NOTE — TELEPHONE ENCOUNTER
Returned call to patient.  Patient informed surgeon's office needs to reach out to this office for clearance.  Per patient, office faxed a form.  Will forward message to Dr. Cristina's team.

## 2025-07-23 NOTE — TELEPHONE ENCOUNTER
Physician provider: Dr. Cristina  Reason for today's call (Please detail here patients chief complaint): Patient request clearance for surgery.     Last office visit:04/24/25  Patient Callback Number: [phone]  Was callback number verified?: Yes  Preferred pharmacy (If refill request): na  Veriified that patient confirmed no refills left at pharmacy? :No  Has the patient called the office for this concern within the last 48 hours?:No    Red Word Mentioned  Warm Transfer Phone Call to (Name): na    Patient notified that their information will be routed to the appropriate team for review. Patient is advised that they will receive a phone call from the appropriate department. If awaiting a call from the triage department and symptoms worsen before receiving a call back, the patient has been advised to proceed to the nearest ED.

## 2025-07-24 ENCOUNTER — OFFICE VISIT (OUTPATIENT)
Dept: ONCOLOGY | Age: 60
End: 2025-07-24
Payer: COMMERCIAL

## 2025-07-24 ENCOUNTER — HOSPITAL ENCOUNTER (OUTPATIENT)
Dept: LAB | Age: 60
Discharge: HOME | End: 2025-07-24
Payer: COMMERCIAL

## 2025-07-24 VITALS
TEMPERATURE: 98.3 F | SYSTOLIC BLOOD PRESSURE: 161 MMHG | WEIGHT: 123.9 LBS | BODY MASS INDEX: 22.8 KG/M2 | HEIGHT: 62 IN | HEART RATE: 82 BPM | OXYGEN SATURATION: 98 % | DIASTOLIC BLOOD PRESSURE: 93 MMHG | RESPIRATION RATE: 18 BRPM

## 2025-07-24 DIAGNOSIS — D50.9 IRON DEFICIENCY ANEMIA, UNSPECIFIED IRON DEFICIENCY ANEMIA TYPE: Primary | ICD-10-CM

## 2025-07-24 DIAGNOSIS — D50.9 IRON DEFICIENCY ANEMIA, UNSPECIFIED IRON DEFICIENCY ANEMIA TYPE: ICD-10-CM

## 2025-07-24 LAB
ALBUMIN SERPL-MCNC: 3.8 G/DL (ref 3.2–4.6)
ALBUMIN/GLOB SERPL: 1.1 (ref 1–1.9)
ALP SERPL-CCNC: 112 U/L (ref 35–104)
ALT SERPL-CCNC: 13 U/L (ref 8–45)
ANION GAP SERPL CALC-SCNC: 14 MMOL/L (ref 7–16)
AST SERPL-CCNC: 22 U/L (ref 15–37)
BASOPHILS # BLD: 0.05 K/UL (ref 0–0.2)
BASOPHILS NFR BLD: 0.8 % (ref 0–2)
BILIRUB SERPL-MCNC: 0.5 MG/DL (ref 0–1.2)
BUN SERPL-MCNC: 11 MG/DL (ref 8–23)
CALCIUM SERPL-MCNC: 9.8 MG/DL (ref 8.8–10.2)
CHLORIDE SERPL-SCNC: 108 MMOL/L (ref 98–107)
CO2 SERPL-SCNC: 21 MMOL/L (ref 20–29)
CREAT SERPL-MCNC: 0.89 MG/DL (ref 0.6–1.1)
DIFFERENTIAL METHOD BLD: ABNORMAL
EOSINOPHIL # BLD: 0.01 K/UL (ref 0–0.8)
EOSINOPHIL NFR BLD: 0.2 % (ref 0.5–7.8)
ERYTHROCYTE [DISTWIDTH] IN BLOOD BY AUTOMATED COUNT: 17.2 % (ref 11.9–14.6)
FERRITIN SERPL-MCNC: 95 NG/ML (ref 8–388)
FOLATE SERPL-MCNC: 4 NG/ML (ref 3.1–17.5)
GLOBULIN SER CALC-MCNC: 3.5 G/DL (ref 2.3–3.5)
GLUCOSE SERPL-MCNC: 92 MG/DL (ref 70–99)
HCT VFR BLD AUTO: 45.8 % (ref 35.8–46.3)
HGB BLD-MCNC: 15.2 G/DL (ref 11.7–15.4)
IMM GRANULOCYTES # BLD AUTO: 0.01 K/UL (ref 0–0.5)
IMM GRANULOCYTES NFR BLD AUTO: 0.2 % (ref 0–5)
IRON SATN MFR SERPL: 34 % (ref 20–50)
IRON SERPL-MCNC: 91 UG/DL (ref 35–100)
LYMPHOCYTES # BLD: 1.44 K/UL (ref 0.5–4.6)
LYMPHOCYTES NFR BLD: 22 % (ref 13–44)
MCH RBC QN AUTO: 29.9 PG (ref 26.1–32.9)
MCHC RBC AUTO-ENTMCNC: 33.2 G/DL (ref 31.4–35)
MCV RBC AUTO: 90.2 FL (ref 82–102)
MONOCYTES # BLD: 0.33 K/UL (ref 0.1–1.3)
MONOCYTES NFR BLD: 5 % (ref 4–12)
NEUTS SEG # BLD: 4.72 K/UL (ref 1.7–8.2)
NEUTS SEG NFR BLD: 71.8 % (ref 43–78)
NRBC # BLD: 0 K/UL (ref 0–0.2)
PLATELET # BLD AUTO: 270 K/UL (ref 150–450)
PMV BLD AUTO: 9.3 FL (ref 9.4–12.3)
POTASSIUM SERPL-SCNC: 3.9 MMOL/L (ref 3.5–5.1)
PROT SERPL-MCNC: 7.3 G/DL (ref 6.3–8.2)
RBC # BLD AUTO: 5.08 M/UL (ref 4.05–5.2)
SODIUM SERPL-SCNC: 143 MMOL/L (ref 136–145)
TIBC SERPL-MCNC: 265 UG/DL (ref 240–450)
UIBC SERPL-MCNC: 174 UG/DL (ref 112–347)
VIT B12 SERPL-MCNC: 446 PG/ML (ref 193–986)
WBC # BLD AUTO: 6.6 K/UL (ref 4.3–11.1)

## 2025-07-24 PROCEDURE — 80053 COMPREHEN METABOLIC PANEL: CPT

## 2025-07-24 PROCEDURE — 83550 IRON BINDING TEST: CPT

## 2025-07-24 PROCEDURE — 83540 ASSAY OF IRON: CPT

## 2025-07-24 PROCEDURE — 99214 OFFICE O/P EST MOD 30 MIN: CPT | Performed by: INTERNAL MEDICINE

## 2025-07-24 PROCEDURE — 36415 COLL VENOUS BLD VENIPUNCTURE: CPT

## 2025-07-24 PROCEDURE — 85025 COMPLETE CBC W/AUTO DIFF WBC: CPT

## 2025-07-24 PROCEDURE — 82607 VITAMIN B-12: CPT

## 2025-07-24 PROCEDURE — 3077F SYST BP >= 140 MM HG: CPT | Performed by: INTERNAL MEDICINE

## 2025-07-24 PROCEDURE — 3080F DIAST BP >= 90 MM HG: CPT | Performed by: INTERNAL MEDICINE

## 2025-07-24 PROCEDURE — 82746 ASSAY OF FOLIC ACID SERUM: CPT

## 2025-07-24 PROCEDURE — 82728 ASSAY OF FERRITIN: CPT

## 2025-07-24 NOTE — PATIENT INSTRUCTIONS
Patient Information from Today's Visit    The members of your [facility] are listed below:    Physician Provider: Susy Cristina Medical Oncologist  Advanced Practice Clinician: Aurea Dunbar NP  Registered Nurse: Payton ARRIAZA RN  Medical Assistant: Kai SANDOVAL CMA  :Aishwraya MARTE  Supportive Care Services: Janneth LOUIS LMSW    Diagnosis (Information Sheet Provided on Day of Diagnosis): ROHAN    Follow Up Instructions:   Labs reviewed  We do want you to get a colonoscopy.  It is okay to proceed with knee replacement surgery you are cleared.  We will plan to see you on a as needed basis, continue to follow your pcp.  Has Treatment Plan Been Finalized? No    Current Labs:   Hospital Outpatient Visit on 07/24/2025   Component Date Value Ref Range Status    Iron 07/24/2025 91  35 - 100 ug/dL Final    TIBC 07/24/2025 265  240 - 450 ug/dL Final    Iron % Saturation 07/24/2025 34  20 - 50 % Final    UIBC 07/24/2025 174.0  112.0 - 347.0 ug/dL Final    Folate 07/24/2025 4.0  3.1 - 17.5 ng/mL Final    Vitamin B-12 07/24/2025 446  193 - 986 pg/mL Final    Ferritin 07/24/2025 95  8 - 388 NG/ML Final    Sodium 07/24/2025 143  136 - 145 mmol/L Final    Potassium 07/24/2025 3.9  3.5 - 5.1 mmol/L Final    Chloride 07/24/2025 108 (H)  98 - 107 mmol/L Final    CO2 07/24/2025 21  20 - 29 mmol/L Final    Anion Gap 07/24/2025 14  7 - 16 mmol/L Final    Glucose 07/24/2025 92  70 - 99 mg/dL Final    Comment: <70 mg/dL Consistent with, but not fully diagnostic of hypoglycemia.  100 - 125 mg/dL Impaired fasting glucose/consistent with pre-diabetes mellitus.  > 126 mg/dl Fasting glucose consistent with overt diabetes mellitus      BUN 07/24/2025 11  8 - 23 MG/DL Final    Creatinine 07/24/2025 0.89  0.60 - 1.10 MG/DL Final    Est, Glom Filt Rate 07/24/2025 74  >60 ml/min/1.73m2 Final    Comment:    Pediatric calculator link: https://www.kidney.org/professionals/kdoqi/gfr_calculatorped     These results are not intended

## 2025-07-25 ENCOUNTER — OFFICE VISIT (OUTPATIENT)
Dept: FAMILY MEDICINE CLINIC | Facility: CLINIC | Age: 60
End: 2025-07-25
Payer: COMMERCIAL

## 2025-07-25 VITALS
TEMPERATURE: 98.7 F | RESPIRATION RATE: 14 BRPM | HEIGHT: 62 IN | HEART RATE: 90 BPM | OXYGEN SATURATION: 99 % | WEIGHT: 125.2 LBS | BODY MASS INDEX: 23.04 KG/M2 | SYSTOLIC BLOOD PRESSURE: 128 MMHG | DIASTOLIC BLOOD PRESSURE: 68 MMHG

## 2025-07-25 DIAGNOSIS — Z01.818 PRE-OPERATIVE CLEARANCE: Primary | ICD-10-CM

## 2025-07-25 DIAGNOSIS — F51.04 PSYCHOPHYSIOLOGICAL INSOMNIA: ICD-10-CM

## 2025-07-25 DIAGNOSIS — Z01.818 PRE-OPERATIVE CLEARANCE: ICD-10-CM

## 2025-07-25 DIAGNOSIS — D50.9 IRON DEFICIENCY ANEMIA, UNSPECIFIED IRON DEFICIENCY ANEMIA TYPE: ICD-10-CM

## 2025-07-25 DIAGNOSIS — M17.11 OSTEOARTHRITIS OF RIGHT KNEE, UNSPECIFIED OSTEOARTHRITIS TYPE: ICD-10-CM

## 2025-07-25 DIAGNOSIS — Z86.73 HISTORY OF TIA (TRANSIENT ISCHEMIC ATTACK): ICD-10-CM

## 2025-07-25 DIAGNOSIS — I10 PRIMARY HYPERTENSION: ICD-10-CM

## 2025-07-25 PROBLEM — G43.709 CHRONIC MIGRAINE WITHOUT AURA WITHOUT STATUS MIGRAINOSUS, NOT INTRACTABLE: Status: ACTIVE | Noted: 2024-01-08

## 2025-07-25 PROBLEM — M43.16 SPONDYLOLISTHESIS OF LUMBAR REGION: Status: ACTIVE | Noted: 2022-09-28

## 2025-07-25 PROBLEM — M47.816 LUMBAR SPONDYLOSIS: Status: ACTIVE | Noted: 2025-07-25

## 2025-07-25 PROBLEM — M19.91 PRIMARY OSTEOARTHRITIS: Status: ACTIVE | Noted: 2025-07-25

## 2025-07-25 PROBLEM — R56.9 SEIZURES (HCC): Status: ACTIVE | Noted: 2024-01-08

## 2025-07-25 PROBLEM — M54.16 LUMBAR RADICULOPATHY: Status: ACTIVE | Noted: 2022-09-28

## 2025-07-25 PROBLEM — M96.1 POST-LAMINECTOMY SYNDROME: Status: ACTIVE | Noted: 2025-07-25

## 2025-07-25 LAB
APTT PPP: 24.4 SEC (ref 23.3–37.4)
BILIRUBIN, URINE, POC: NEGATIVE
BLOOD URINE, POC: NEGATIVE
EST. AVERAGE GLUCOSE BLD GHB EST-MCNC: 102 MG/DL
GLUCOSE URINE, POC: NEGATIVE
HBA1C MFR BLD: 5.2 % (ref 0–5.6)
INR PPP: 0.9
KETONES, URINE, POC: NEGATIVE
LEUKOCYTE ESTERASE, URINE, POC: NEGATIVE
NITRITE, URINE, POC: NEGATIVE
PH, URINE, POC: 5.5 (ref 4.6–8)
PROTEIN,URINE, POC: NEGATIVE
PROTHROMBIN TIME: 12.2 SEC (ref 11.3–14.9)
SPECIFIC GRAVITY, URINE, POC: 1.02 (ref 1–1.03)
URINALYSIS CLARITY, POC: CLEAR
URINALYSIS COLOR, POC: YELLOW
UROBILINOGEN, POC: NORMAL

## 2025-07-25 PROCEDURE — 3074F SYST BP LT 130 MM HG: CPT | Performed by: FAMILY MEDICINE

## 2025-07-25 PROCEDURE — 81003 URINALYSIS AUTO W/O SCOPE: CPT | Performed by: FAMILY MEDICINE

## 2025-07-25 PROCEDURE — 3078F DIAST BP <80 MM HG: CPT | Performed by: FAMILY MEDICINE

## 2025-07-25 PROCEDURE — 99214 OFFICE O/P EST MOD 30 MIN: CPT | Performed by: FAMILY MEDICINE

## 2025-07-25 PROCEDURE — 93000 ELECTROCARDIOGRAM COMPLETE: CPT | Performed by: FAMILY MEDICINE

## 2025-07-25 RX ORDER — ESZOPICLONE 3 MG/1
3 TABLET, FILM COATED ORAL NIGHTLY PRN
Qty: 30 TABLET | Refills: 0 | Status: SHIPPED | OUTPATIENT
Start: 2025-07-25 | End: 2025-08-24

## 2025-07-25 NOTE — PROGRESS NOTES
Cardiovascular:      Rate and Rhythm: Normal rate and regular rhythm.      Heart sounds: Normal heart sounds. No murmur heard.  Pulmonary:      Effort: Pulmonary effort is normal.      Breath sounds: Normal breath sounds.   Abdominal:      Tenderness: There is no abdominal tenderness.   Musculoskeletal:      Cervical back: Neck supple.      Right lower leg: No edema.      Left lower leg: No edema.   Neurological:      General: No focal deficit present.      Mental Status: She is alert and oriented to person, place, and time.          Physical Exam  Mouth/Throat: No abnormalities noted on oral examination.  Respiratory: Clear to auscultation, no wheezing, rales or rhonchi.  Gastrointestinal: Soft, no tenderness, no distention, no masses.       Assessment & Plan  1. Preoperative clearance: Stable. Hemoglobin levels have shown significant improvement. MRI results did not indicate any signs of a stroke, suggesting a transient ischemic attack (TIA) with transient neurologic symptoms that resolved with normal imaging. Holter monitor revealed occasional premature atrial contractions (PACs) which she remained asymptomatic. Today's EKG results were satisfactory.  - Order chest x-ray.  - Conduct additional lab work and urine test today.  - Order carotid Doppler test for further evaluation given history of TIA  -based on NSQIP calculator patient is below average risk for complications from surgery  -patient has a history of seizure disorder.  Would recommend avoiding agents that lower seizure threshold.  Recommend against patient taking tramadol for pain relief.     2. Insomnia: Chronic.  - Discussed various treatment options including doxepin, amitriptyline, gabapentin, mirtazapine, Belsomra, Ambien, Lunesta, and Seroquel.  - Explained potential benefits and risks of each medication.  - Prescribe Lunesta.  - Advise to discuss its effectiveness and continuation with Sherry during upcoming appointment.    3. Anemia:

## 2025-07-26 ASSESSMENT — ENCOUNTER SYMPTOMS
NAUSEA: 0
CONSTIPATION: 0
DIARRHEA: 0
SHORTNESS OF BREATH: 0
WHEEZING: 0
CHOKING: 0
VOMITING: 0
ABDOMINAL PAIN: 0

## 2025-07-28 ENCOUNTER — RESULTS FOLLOW-UP (OUTPATIENT)
Dept: FAMILY MEDICINE CLINIC | Facility: CLINIC | Age: 60
End: 2025-07-28

## 2025-07-29 ENCOUNTER — HOSPITAL ENCOUNTER (OUTPATIENT)
Dept: GENERAL RADIOLOGY | Age: 60
Discharge: HOME | End: 2025-07-31
Payer: COMMERCIAL

## 2025-07-29 DIAGNOSIS — Z01.818 PRE-OPERATIVE CLEARANCE: ICD-10-CM

## 2025-07-29 LAB
MRSA DNA SPEC QL NAA+PROBE: NOT DETECTED
S AUREUS CPE NOSE QL NAA+PROBE: NOT DETECTED

## 2025-07-29 PROCEDURE — 71046 X-RAY EXAM CHEST 2 VIEWS: CPT

## 2025-07-30 ENCOUNTER — RESULTS FOLLOW-UP (OUTPATIENT)
Dept: FAMILY MEDICINE CLINIC | Facility: CLINIC | Age: 60
End: 2025-07-30

## 2025-08-01 ENCOUNTER — TELEPHONE (OUTPATIENT)
Dept: FAMILY MEDICINE CLINIC | Facility: CLINIC | Age: 60
End: 2025-08-01

## 2025-08-01 NOTE — TELEPHONE ENCOUNTER
[facility] with [facility] called stating they did not receive surg clearance labs chest xray. Will resend to [facility] at [phone]

## 2025-08-25 ENCOUNTER — PATIENT MESSAGE (OUTPATIENT)
Dept: FAMILY MEDICINE CLINIC | Facility: CLINIC | Age: 60
End: 2025-08-25

## 2025-08-25 DIAGNOSIS — F51.04 PSYCHOPHYSIOLOGICAL INSOMNIA: Primary | ICD-10-CM

## 2025-08-28 RX ORDER — ESZOPICLONE 3 MG/1
3 TABLET, FILM COATED ORAL NIGHTLY PRN
Qty: 30 TABLET | Refills: 0 | Status: SHIPPED | OUTPATIENT
Start: 2025-08-28 | End: 2025-09-27

## 2025-09-03 ENCOUNTER — TELEMEDICINE (OUTPATIENT)
Dept: FAMILY MEDICINE CLINIC | Facility: CLINIC | Age: 60
End: 2025-09-03
Payer: COMMERCIAL

## 2025-09-03 DIAGNOSIS — F41.1 GAD (GENERALIZED ANXIETY DISORDER): ICD-10-CM

## 2025-09-03 DIAGNOSIS — F51.04 PSYCHOPHYSIOLOGICAL INSOMNIA: Primary | ICD-10-CM

## 2025-09-03 DIAGNOSIS — F32.1 CURRENT MODERATE EPISODE OF MAJOR DEPRESSIVE DISORDER WITHOUT PRIOR EPISODE (HCC): ICD-10-CM

## 2025-09-03 PROCEDURE — 99214 OFFICE O/P EST MOD 30 MIN: CPT | Performed by: NURSE PRACTITIONER

## 2025-09-03 RX ORDER — ESZOPICLONE 3 MG/1
3 TABLET, FILM COATED ORAL NIGHTLY PRN
Qty: 30 TABLET | Refills: 2 | Status: SHIPPED | OUTPATIENT
Start: 2025-09-27 | End: 2025-12-26

## (undated) DEVICE — DRAIN SURG 10FR L1/8IN RND CHN FULL FLUT HEAT POLISHED PERF

## (undated) DEVICE — INTENDED FOR TISSUE SEPARATION, AND OTHER PROCEDURES THAT REQUIRE A SHARP SURGICAL BLADE TO PUNCTURE OR CUT.: Brand: BARD-PARKER ® STAINLESS STEEL BLADES

## (undated) DEVICE — SUTURE NONABSORBABLE MONOFILAMENT 6-0 BV-1 1X30 IN PROLENE 8709H

## (undated) DEVICE — SYSTEM SKIN CLSR 22CM DERMBND PRINEO

## (undated) DEVICE — POSTERIOR LAMI VANPLT-LUCAS: Brand: MEDLINE INDUSTRIES, INC.

## (undated) DEVICE — ABSORBENT, WATERPROOF, BACTERIA PROOF FILM DRESSING: Brand: OPSITE POST OP 20X10CM CTN 20

## (undated) DEVICE — HAND PK

## (undated) DEVICE — DRESSING,GAUZE,XEROFORM,CURAD,1"X8",ST: Brand: CURAD

## (undated) DEVICE — MODULAR TAP: Brand: XIA 4.5 SYSTEM -  XIA CT

## (undated) DEVICE — SLING ARM 2-37INX8-17IN PCH -- MED

## (undated) DEVICE — DRAPE XR C ARM 41X74IN LF --

## (undated) DEVICE — SOLUTION IRRIG 1000ML 09% SOD CHL USP PIC PLAS CONTAINER

## (undated) DEVICE — NEEDLE SPNL 22GA L3.5IN BLK HUB S STL REG WALL FIT STYL W/

## (undated) DEVICE — SUTURE NONABSORBABLE MONOFILAMENT 4-0 PS-2 18 IN BLU PROLENE 8682H

## (undated) DEVICE — SUT PROL 5-0 18IN P3 BLU --

## (undated) DEVICE — BNDG SOF-FORM 2X75 STRL LF --

## (undated) DEVICE — AGENT HEMOSTATIC SURGIFLOW MATRIX KIT W/THROMBIN

## (undated) DEVICE — DRILL BIT: Brand: XIA 4.5 SYSTEM -  XIA CT

## (undated) DEVICE — SET IRRIG DST FLX M CONN

## (undated) DEVICE — WIRE FIX K 2 TRCR 0.9MMX15.2CM --

## (undated) DEVICE — PADDING CAST COHESIVE 4 YDX3 IN HND TEARABLE COTTON SPEC 100

## (undated) DEVICE — 2000CC GUARDIAN II: Brand: GUARDIAN

## (undated) DEVICE — SUTURE FIBERWIRE 4-0 L18IN NONABSORBABLE BLU L12.3MM 3/8 AR723001

## (undated) DEVICE — SUTURE VCRL SZ 2-0 L27IN ABSRB UD L36MM CP-1 1/2 CIR REV J266H

## (undated) DEVICE — AMD ANTIMICROBIAL GAUZE SPONGES,12 PLY USP TYPE VII, 0.2% POLYHEXAMETHYLENE BIGUANIDE HCI (PHMB): Brand: CURITY

## (undated) DEVICE — RESERVOIR,SUCTION,100CC,SILICONE: Brand: MEDLINE

## (undated) DEVICE — BUTTON SWITCH PENCIL BLADE ELECTRODE, HOLSTER: Brand: EDGE

## (undated) DEVICE — SET IRRIG W 96IN TBNG 4 LN FLX BG

## (undated) DEVICE — CAP PROTCT PIN BALL 0.045IN --

## (undated) DEVICE — SUTURE VCRL SZ 1 L27IN ABSRB UD L36MM CP-1 1/2 CIR REV CUT J268H

## (undated) DEVICE — TRAY,URINE METER,100% SILICONE,16FR10ML: Brand: MEDLINE

## (undated) DEVICE — WRAP SELF ADH COBAN BLU 1 IN X 5 YD

## (undated) DEVICE — HOOK LOCK LATEX FREE ELASTIC BANDAGE 3INX5YD

## (undated) DEVICE — BIPOLAR SEALER 23-112-1 AQM 6.0: Brand: AQUAMANTYS ®

## (undated) DEVICE — Device

## (undated) DEVICE — SOLUTION IV 1000ML 0.9% SOD CHL

## (undated) DEVICE — TRNQT RMFG 18IN CUFF SING BLAD -- LAWSON OEM ITEM 338151

## (undated) DEVICE — (D)PREP SKN CHLRAPRP APPL 26ML -- CONVERT TO ITEM 371833

## (undated) DEVICE — STERILE PACKAGE WITH CANNULA: Brand: LITE BIO DELIVERY SYSTEM

## (undated) DEVICE — SOLUTION IRRIG 3000ML 0.9% SOD CHL FLX CONT 0797208] ICU MEDICAL INC]

## (undated) DEVICE — ABSORBENT, WATERPROOF, BACTERIA PROOF FILM DRESSING: Brand: OPSITE POST OP 9.5X8.5CM CTN 20